# Patient Record
Sex: MALE | Race: BLACK OR AFRICAN AMERICAN | Employment: OTHER | URBAN - METROPOLITAN AREA
[De-identification: names, ages, dates, MRNs, and addresses within clinical notes are randomized per-mention and may not be internally consistent; named-entity substitution may affect disease eponyms.]

---

## 2018-03-15 ENCOUNTER — NURSE ONLY (OUTPATIENT)
Dept: FAMILY MEDICINE CLINIC | Age: 68
End: 2018-03-15
Payer: MEDICARE

## 2018-03-15 ENCOUNTER — HOSPITAL ENCOUNTER (OUTPATIENT)
Age: 68
Discharge: HOME | End: 2018-03-17
Payer: MEDICARE

## 2018-03-15 DIAGNOSIS — R71.8 MICROCYTOSIS: Primary | ICD-10-CM

## 2018-03-15 DIAGNOSIS — R71.8 MICROCYTOSIS: ICD-10-CM

## 2018-03-15 PROCEDURE — 83550 IRON BINDING TEST: CPT

## 2018-03-15 PROCEDURE — 83540 ASSAY OF IRON: CPT

## 2018-03-15 PROCEDURE — 82728 ASSAY OF FERRITIN: CPT

## 2018-03-15 PROCEDURE — 36415 COLL VENOUS BLD VENIPUNCTURE: CPT | Performed by: FAMILY MEDICINE

## 2018-03-16 ENCOUNTER — HOSPITAL ENCOUNTER (OUTPATIENT)
Age: 68
Discharge: HOME | End: 2018-03-18
Payer: MEDICARE

## 2018-03-16 ENCOUNTER — OFFICE VISIT (OUTPATIENT)
Dept: FAMILY MEDICINE CLINIC | Age: 68
End: 2018-03-16
Payer: MEDICARE

## 2018-03-16 VITALS
BODY MASS INDEX: 34.03 KG/M2 | OXYGEN SATURATION: 92 % | DIASTOLIC BLOOD PRESSURE: 78 MMHG | HEART RATE: 60 BPM | SYSTOLIC BLOOD PRESSURE: 132 MMHG | WEIGHT: 244 LBS | TEMPERATURE: 97.6 F

## 2018-03-16 DIAGNOSIS — G89.29 CHRONIC PAIN OF LEFT KNEE: Primary | ICD-10-CM

## 2018-03-16 DIAGNOSIS — R06.02 SOBOE (SHORTNESS OF BREATH ON EXERTION): ICD-10-CM

## 2018-03-16 DIAGNOSIS — G89.29 CHRONIC PAIN OF LEFT KNEE: ICD-10-CM

## 2018-03-16 DIAGNOSIS — F33.42 RECURRENT MAJOR DEPRESSIVE EPISODES, IN FULL REMISSION (HCC): ICD-10-CM

## 2018-03-16 DIAGNOSIS — M25.562 CHRONIC PAIN OF LEFT KNEE: Primary | ICD-10-CM

## 2018-03-16 DIAGNOSIS — Z98.890 S/P LEFT KNEE SURGERY: ICD-10-CM

## 2018-03-16 DIAGNOSIS — M25.562 CHRONIC PAIN OF LEFT KNEE: ICD-10-CM

## 2018-03-16 LAB
BASOPHILS ABSOLUTE: 0.05 E9/L (ref 0–0.2)
BASOPHILS RELATIVE PERCENT: 0.7 % (ref 0–2)
C-REACTIVE PROTEIN: <0.1 MG/DL (ref 0–0.4)
EOSINOPHILS ABSOLUTE: 0.04 E9/L (ref 0.05–0.5)
EOSINOPHILS RELATIVE PERCENT: 0.5 % (ref 0–6)
FERRITIN: 108 NG/ML
HCT VFR BLD CALC: 40.8 % (ref 37–54)
HEMOGLOBIN: 15.2 G/DL (ref 12.5–16.5)
IMMATURE GRANULOCYTES #: 0.01 E9/L
IMMATURE GRANULOCYTES %: 0.1 % (ref 0–5)
IRON SATURATION: 21 % (ref 20–55)
IRON: 82 MCG/DL (ref 59–158)
LYMPHOCYTES ABSOLUTE: 1.98 E9/L (ref 1.5–4)
LYMPHOCYTES RELATIVE PERCENT: 26.8 % (ref 20–42)
MCH RBC QN AUTO: 29 PG (ref 26–35)
MCHC RBC AUTO-ENTMCNC: 37.3 % (ref 32–34.5)
MCV RBC AUTO: 77.9 FL (ref 80–99.9)
MONOCYTES ABSOLUTE: 0.68 E9/L (ref 0.1–0.95)
MONOCYTES RELATIVE PERCENT: 9.2 % (ref 2–12)
NEUTROPHILS ABSOLUTE: 4.63 E9/L (ref 1.8–7.3)
NEUTROPHILS RELATIVE PERCENT: 62.7 % (ref 43–80)
PDW BLD-RTO: 15.7 FL (ref 11.5–15)
PLATELET # BLD: 173 E9/L (ref 130–450)
PMV BLD AUTO: 12.8 FL (ref 7–12)
RBC # BLD: 5.24 E12/L (ref 3.8–5.8)
TOTAL IRON BINDING CAPACITY: 394 MCG/DL (ref 250–450)
WBC # BLD: 7.4 E9/L (ref 4.5–11.5)

## 2018-03-16 PROCEDURE — 85025 COMPLETE CBC W/AUTO DIFF WBC: CPT

## 2018-03-16 PROCEDURE — 85651 RBC SED RATE NONAUTOMATED: CPT

## 2018-03-16 PROCEDURE — 93000 ELECTROCARDIOGRAM COMPLETE: CPT | Performed by: FAMILY MEDICINE

## 2018-03-16 PROCEDURE — 36415 COLL VENOUS BLD VENIPUNCTURE: CPT | Performed by: FAMILY MEDICINE

## 2018-03-16 PROCEDURE — 86140 C-REACTIVE PROTEIN: CPT

## 2018-03-16 PROCEDURE — 99214 OFFICE O/P EST MOD 30 MIN: CPT | Performed by: FAMILY MEDICINE

## 2018-03-16 PROCEDURE — G0444 DEPRESSION SCREEN ANNUAL: HCPCS | Performed by: FAMILY MEDICINE

## 2018-03-16 RX ORDER — HYDROCHLOROTHIAZIDE 50 MG/1
50 TABLET ORAL DAILY
Qty: 30 TABLET | Refills: 5 | Status: SHIPPED | OUTPATIENT
Start: 2018-03-16 | End: 2019-10-17

## 2018-03-16 RX ORDER — TAMSULOSIN HYDROCHLORIDE 0.4 MG/1
0.8 CAPSULE ORAL DAILY
Qty: 60 CAPSULE | Refills: 3 | Status: SHIPPED | OUTPATIENT
Start: 2018-03-16 | End: 2018-03-19 | Stop reason: SDUPTHER

## 2018-03-16 RX ORDER — AMLODIPINE BESYLATE 10 MG/1
TABLET ORAL
Qty: 30 TABLET | Refills: 5 | Status: SHIPPED | OUTPATIENT
Start: 2018-03-16 | End: 2019-10-17 | Stop reason: SDUPTHER

## 2018-03-16 RX ORDER — METOPROLOL SUCCINATE 50 MG/1
50 TABLET, EXTENDED RELEASE ORAL 2 TIMES DAILY
Qty: 60 TABLET | Refills: 5 | Status: SHIPPED | OUTPATIENT
Start: 2018-03-16 | End: 2019-10-17 | Stop reason: SDUPTHER

## 2018-03-16 RX ORDER — OMEPRAZOLE 20 MG/1
20 CAPSULE, DELAYED RELEASE ORAL DAILY
Qty: 30 CAPSULE | Refills: 2 | Status: SHIPPED | OUTPATIENT
Start: 2018-03-16 | End: 2019-10-17 | Stop reason: SDUPTHER

## 2018-03-16 ASSESSMENT — PATIENT HEALTH QUESTIONNAIRE - PHQ9
SUM OF ALL RESPONSES TO PHQ9 QUESTIONS 1 & 2: 3
3. TROUBLE FALLING OR STAYING ASLEEP: 2
7. TROUBLE CONCENTRATING ON THINGS, SUCH AS READING THE NEWSPAPER OR WATCHING TELEVISION: 2
5. POOR APPETITE OR OVEREATING: 0
8. MOVING OR SPEAKING SO SLOWLY THAT OTHER PEOPLE COULD HAVE NOTICED. OR THE OPPOSITE, BEING SO FIGETY OR RESTLESS THAT YOU HAVE BEEN MOVING AROUND A LOT MORE THAN USUAL: 0
4. FEELING TIRED OR HAVING LITTLE ENERGY: 2
2. FEELING DOWN, DEPRESSED OR HOPELESS: 3
10. IF YOU CHECKED OFF ANY PROBLEMS, HOW DIFFICULT HAVE THESE PROBLEMS MADE IT FOR YOU TO DO YOUR WORK, TAKE CARE OF THINGS AT HOME, OR GET ALONG WITH OTHER PEOPLE: 2
SUM OF ALL RESPONSES TO PHQ QUESTIONS 1-9: 9
9. THOUGHTS THAT YOU WOULD BE BETTER OFF DEAD, OR OF HURTING YOURSELF: 0
6. FEELING BAD ABOUT YOURSELF - OR THAT YOU ARE A FAILURE OR HAVE LET YOURSELF OR YOUR FAMILY DOWN: 0

## 2018-03-16 NOTE — PROGRESS NOTES
says that his back pain is basically resolved after the purchase of a new mattress which she has been sleeping on. Family History   Problem Relation Age of Onset    High Blood Pressure Father     Diabetes Sister     Heart Disease Brother     Diabetes Sister     Diabetes Sister        Past Surgical History:   Procedure Laterality Date    CARDIAC PACEMAKER PLACEMENT  2014    CHOLECYSTECTOMY  1999    VENTRICULOPERITONEAL SHUNT  1994       Social History   Substance Use Topics    Smoking status: Former Smoker     Types: Cigarettes    Smokeless tobacco: Never Used      Comment: quit 40 years    Alcohol use Yes      Comment: rare       ROS:  No CP, No palpitations,   +sob, No cough,   No abd pain, No heartburn,   No headaches,   No tingling, No numbness, No weakness,   No bowel changes, No hematochezia, No melena,  No bladder changes, No hematuria  No skin rashes, No skin lesions. No vision changes, No hearing changes,   No polyuria, polydipsia, polyphagia. Stable mood. ROS otherwise negative unless as listed in HPI. Chart reviewed and updated where appropriate for PMH, Fam, and Soc Hx. Physical Exam   /78 (Site: Right Arm, Position: Sitting, Cuff Size: Medium Adult)   Pulse 60   Temp 97.6 °F (36.4 °C) (Oral)   Wt 244 lb (110.7 kg)   SpO2 92%   BMI 34.03 kg/m²   Wt Readings from Last 3 Encounters:   03/16/18 244 lb (110.7 kg)   01/17/18 275 lb (124.7 kg)   01/02/18 245 lb 4.8 oz (111.3 kg)       Constitutional:    He is oriented to person, place, and time. He appears well-developed and well-nourished. HENT:    Nose: Nose normal.    Mouth/Throat: Oropharynx is clear and moist.   Eyes:    Conjunctivae are normal.    Pupils are equal, round, and reactive to light. EOMI. Neck:    Normal range of motion. No thyromegaly or nodules noted. No bruit. Cardiovascular:    Normal rate, regular rhythm and normal heart sounds. No murmur. No gallop and no friction rub.

## 2018-03-17 LAB — SEDIMENTATION RATE, ERYTHROCYTE: 0 MM/HR (ref 0–15)

## 2018-03-19 ENCOUNTER — TELEPHONE (OUTPATIENT)
Dept: FAMILY MEDICINE CLINIC | Age: 68
End: 2018-03-19

## 2018-03-19 NOTE — TELEPHONE ENCOUNTER
Elizabeth from the [address] [place] [address] called [phone]- they are going to schedule with Dr Sandoval as soon as they get in contact with the patient. Just wanted  to be aware they are trying to get him an appt.

## 2018-03-20 RX ORDER — TAMSULOSIN HYDROCHLORIDE 0.4 MG/1
0.8 CAPSULE ORAL DAILY
Qty: 60 CAPSULE | Refills: 3 | Status: SHIPPED | OUTPATIENT
Start: 2018-03-20 | End: 2018-04-18

## 2018-04-06 ENCOUNTER — TELEPHONE (OUTPATIENT)
Dept: FAMILY MEDICINE CLINIC | Age: 68
End: 2018-04-06

## 2018-04-06 DIAGNOSIS — G89.29 CHRONIC PAIN OF LEFT KNEE: Primary | ICD-10-CM

## 2018-04-06 DIAGNOSIS — M25.562 CHRONIC PAIN OF LEFT KNEE: Primary | ICD-10-CM

## 2018-04-06 DIAGNOSIS — Z96.652 HISTORY OF LEFT KNEE REPLACEMENT: ICD-10-CM

## 2018-04-18 ENCOUNTER — HOSPITAL ENCOUNTER (EMERGENCY)
Age: 68
Discharge: HOME | End: 2018-04-18
Attending: EMERGENCY MEDICINE
Payer: MEDICARE

## 2018-04-18 VITALS
RESPIRATION RATE: 18 BRPM | HEART RATE: 61 BPM | OXYGEN SATURATION: 96 % | DIASTOLIC BLOOD PRESSURE: 90 MMHG | TEMPERATURE: 98.2 F | SYSTOLIC BLOOD PRESSURE: 149 MMHG

## 2018-04-18 DIAGNOSIS — N30.01 ACUTE CYSTITIS WITH HEMATURIA: Primary | ICD-10-CM

## 2018-04-18 LAB
ANION GAP SERPL CALCULATED.3IONS-SCNC: 11 MMOL/L (ref 7–16)
BACTERIA: ABNORMAL /HPF
BASOPHILS ABSOLUTE: 0.06 E9/L (ref 0–0.2)
BASOPHILS RELATIVE PERCENT: 0.5 % (ref 0–2)
BILIRUBIN URINE: NEGATIVE
BLOOD, URINE: ABNORMAL
BUN BLDV-MCNC: 15 MG/DL (ref 8–23)
CALCIUM SERPL-MCNC: 9 MG/DL (ref 8.6–10.2)
CHLORIDE BLD-SCNC: 103 MMOL/L (ref 98–107)
CLARITY: ABNORMAL
CO2: 23 MMOL/L (ref 22–29)
COLOR: YELLOW
CREAT SERPL-MCNC: 1 MG/DL (ref 0.7–1.2)
EOSINOPHILS ABSOLUTE: 0.07 E9/L (ref 0.05–0.5)
EOSINOPHILS RELATIVE PERCENT: 0.6 % (ref 0–6)
GFR AFRICAN AMERICAN: >60
GFR NON-AFRICAN AMERICAN: >60 ML/MIN/1.73
GLUCOSE BLD-MCNC: 111 MG/DL (ref 74–109)
GLUCOSE URINE: NEGATIVE MG/DL
HCT VFR BLD CALC: 37.2 % (ref 37–54)
HEMOGLOBIN: 13.6 G/DL (ref 12.5–16.5)
IMMATURE GRANULOCYTES #: 0.05 E9/L
IMMATURE GRANULOCYTES %: 0.4 % (ref 0–5)
KETONES, URINE: NEGATIVE MG/DL
LEUKOCYTE ESTERASE, URINE: ABNORMAL
LYMPHOCYTES ABSOLUTE: 2.04 E9/L (ref 1.5–4)
LYMPHOCYTES RELATIVE PERCENT: 18.1 % (ref 20–42)
MCH RBC QN AUTO: 29.3 PG (ref 26–35)
MCHC RBC AUTO-ENTMCNC: 36.6 % (ref 32–34.5)
MCV RBC AUTO: 80.2 FL (ref 80–99.9)
MONOCYTES ABSOLUTE: 0.95 E9/L (ref 0.1–0.95)
MONOCYTES RELATIVE PERCENT: 8.5 % (ref 2–12)
NEUTROPHILS ABSOLUTE: 8.07 E9/L (ref 1.8–7.3)
NEUTROPHILS RELATIVE PERCENT: 71.9 % (ref 43–80)
NITRITE, URINE: POSITIVE
PDW BLD-RTO: 14.2 FL (ref 11.5–15)
PH UA: 6 (ref 5–9)
PLATELET # BLD: 177 E9/L (ref 130–450)
PMV BLD AUTO: 11.3 FL (ref 7–12)
POTASSIUM SERPL-SCNC: 4.1 MMOL/L (ref 3.5–5)
PROTEIN UA: 30 MG/DL
RBC # BLD: 4.64 E12/L (ref 3.8–5.8)
RBC UA: ABNORMAL /HPF (ref 0–2)
SODIUM BLD-SCNC: 137 MMOL/L (ref 132–146)
SPECIFIC GRAVITY UA: 1.01 (ref 1–1.03)
UROBILINOGEN, URINE: 0.2 E.U./DL
WBC # BLD: 11.2 E9/L (ref 4.5–11.5)
WBC UA: >20 /HPF (ref 0–5)

## 2018-04-18 PROCEDURE — 2580000003 HC RX 258: Performed by: PHYSICIAN ASSISTANT

## 2018-04-18 PROCEDURE — 99284 EMERGENCY DEPT VISIT MOD MDM: CPT

## 2018-04-18 PROCEDURE — 85025 COMPLETE CBC W/AUTO DIFF WBC: CPT

## 2018-04-18 PROCEDURE — 80048 BASIC METABOLIC PNL TOTAL CA: CPT

## 2018-04-18 PROCEDURE — 96365 THER/PROPH/DIAG IV INF INIT: CPT

## 2018-04-18 PROCEDURE — 81001 URINALYSIS AUTO W/SCOPE: CPT

## 2018-04-18 PROCEDURE — 96375 TX/PRO/DX INJ NEW DRUG ADDON: CPT

## 2018-04-18 PROCEDURE — 87088 URINE BACTERIA CULTURE: CPT

## 2018-04-18 PROCEDURE — 87186 SC STD MICRODIL/AGAR DIL: CPT

## 2018-04-18 PROCEDURE — 6360000002 HC RX W HCPCS: Performed by: PHYSICIAN ASSISTANT

## 2018-04-18 RX ORDER — CEFDINIR 300 MG/1
300 CAPSULE ORAL 2 TIMES DAILY
Qty: 20 CAPSULE | Refills: 0 | Status: SHIPPED | OUTPATIENT
Start: 2018-04-18 | End: 2018-04-28

## 2018-04-18 RX ORDER — PHENAZOPYRIDINE HYDROCHLORIDE 100 MG/1
100 TABLET, FILM COATED ORAL 3 TIMES DAILY PRN
Qty: 9 TABLET | Refills: 0 | Status: SHIPPED | OUTPATIENT
Start: 2018-04-18 | End: 2018-04-21

## 2018-04-18 RX ORDER — OXYCODONE HYDROCHLORIDE AND ACETAMINOPHEN 5; 325 MG/1; MG/1
1 TABLET ORAL EVERY 6 HOURS PRN
Qty: 10 TABLET | Refills: 0 | Status: SHIPPED | OUTPATIENT
Start: 2018-04-18 | End: 2018-04-21

## 2018-04-18 RX ORDER — TAMSULOSIN HYDROCHLORIDE 0.4 MG/1
CAPSULE ORAL
Qty: 60 CAPSULE | Refills: 1 | Status: SHIPPED | OUTPATIENT
Start: 2018-04-18 | End: 2018-04-22 | Stop reason: ALTCHOICE

## 2018-04-18 RX ORDER — ONDANSETRON 4 MG/1
4 TABLET, ORALLY DISINTEGRATING ORAL EVERY 8 HOURS PRN
Qty: 10 TABLET | Refills: 0 | Status: SHIPPED | OUTPATIENT
Start: 2018-04-18 | End: 2018-04-22 | Stop reason: ALTCHOICE

## 2018-04-18 RX ORDER — KETOROLAC TROMETHAMINE 30 MG/ML
15 INJECTION, SOLUTION INTRAMUSCULAR; INTRAVENOUS ONCE
Status: COMPLETED | OUTPATIENT
Start: 2018-04-18 | End: 2018-04-18

## 2018-04-18 RX ORDER — ONDANSETRON 2 MG/ML
4 INJECTION INTRAMUSCULAR; INTRAVENOUS ONCE
Status: COMPLETED | OUTPATIENT
Start: 2018-04-18 | End: 2018-04-18

## 2018-04-18 RX ADMIN — KETOROLAC TROMETHAMINE 15 MG: 30 INJECTION, SOLUTION INTRAMUSCULAR at 10:18

## 2018-04-18 RX ADMIN — ONDANSETRON 4 MG: 2 INJECTION INTRAMUSCULAR; INTRAVENOUS at 10:18

## 2018-04-18 RX ADMIN — CEFTRIAXONE 1 G: 1 INJECTION, POWDER, FOR SOLUTION INTRAMUSCULAR; INTRAVENOUS at 11:24

## 2018-04-18 ASSESSMENT — PAIN DESCRIPTION - FREQUENCY: FREQUENCY: CONTINUOUS

## 2018-04-18 ASSESSMENT — PAIN DESCRIPTION - LOCATION: LOCATION: FLANK

## 2018-04-18 ASSESSMENT — PAIN DESCRIPTION - ORIENTATION: ORIENTATION: LEFT;RIGHT

## 2018-04-18 ASSESSMENT — PAIN DESCRIPTION - DESCRIPTORS: DESCRIPTORS: STABBING

## 2018-04-18 ASSESSMENT — PAIN SCALES - GENERAL
PAINLEVEL_OUTOF10: 6
PAINLEVEL_OUTOF10: 6

## 2018-04-18 ASSESSMENT — PAIN DESCRIPTION - PAIN TYPE: TYPE: ACUTE PAIN

## 2018-04-20 LAB
ORGANISM: ABNORMAL
URINE CULTURE, ROUTINE: ABNORMAL
URINE CULTURE, ROUTINE: ABNORMAL

## 2018-04-22 ENCOUNTER — HOSPITAL ENCOUNTER (EMERGENCY)
Age: 68
Discharge: HOME | End: 2018-04-22
Payer: MEDICARE

## 2018-04-22 ENCOUNTER — APPOINTMENT (OUTPATIENT)
Dept: GENERAL RADIOLOGY | Age: 68
End: 2018-04-22
Payer: MEDICARE

## 2018-04-22 VITALS
BODY MASS INDEX: 34.16 KG/M2 | DIASTOLIC BLOOD PRESSURE: 98 MMHG | RESPIRATION RATE: 14 BRPM | OXYGEN SATURATION: 94 % | WEIGHT: 244 LBS | TEMPERATURE: 98.1 F | SYSTOLIC BLOOD PRESSURE: 163 MMHG | HEIGHT: 71 IN | HEART RATE: 65 BPM

## 2018-04-22 DIAGNOSIS — M25.562 CHRONIC PAIN OF LEFT KNEE: ICD-10-CM

## 2018-04-22 DIAGNOSIS — G89.29 CHRONIC PAIN OF LEFT KNEE: ICD-10-CM

## 2018-04-22 DIAGNOSIS — M17.11 ARTHRITIS OF RIGHT KNEE: Primary | ICD-10-CM

## 2018-04-22 DIAGNOSIS — M54.50 ACUTE EXACERBATION OF CHRONIC LOW BACK PAIN: ICD-10-CM

## 2018-04-22 DIAGNOSIS — G89.29 ACUTE EXACERBATION OF CHRONIC LOW BACK PAIN: ICD-10-CM

## 2018-04-22 PROCEDURE — 6370000000 HC RX 637 (ALT 250 FOR IP): Performed by: PHYSICIAN ASSISTANT

## 2018-04-22 PROCEDURE — 73564 X-RAY EXAM KNEE 4 OR MORE: CPT

## 2018-04-22 PROCEDURE — 72110 X-RAY EXAM L-2 SPINE 4/>VWS: CPT

## 2018-04-22 PROCEDURE — 99283 EMERGENCY DEPT VISIT LOW MDM: CPT

## 2018-04-22 RX ORDER — NAPROXEN 500 MG/1
500 TABLET ORAL 2 TIMES DAILY
Qty: 14 TABLET | Refills: 0 | Status: SHIPPED | OUTPATIENT
Start: 2018-04-22 | End: 2018-04-22 | Stop reason: ALTCHOICE

## 2018-04-22 RX ORDER — HYDROCODONE BITARTRATE AND ACETAMINOPHEN 5; 325 MG/1; MG/1
1 TABLET ORAL ONCE
Status: COMPLETED | OUTPATIENT
Start: 2018-04-22 | End: 2018-04-22

## 2018-04-22 RX ORDER — ACETAMINOPHEN 500 MG
1000 TABLET ORAL EVERY 8 HOURS PRN
Qty: 20 TABLET | Refills: 0 | Status: SHIPPED | OUTPATIENT
Start: 2018-04-22 | End: 2019-10-17

## 2018-04-22 RX ADMIN — HYDROCODONE BITARTRATE AND ACETAMINOPHEN 1 TABLET: 5; 325 TABLET ORAL at 08:41

## 2018-04-22 ASSESSMENT — PAIN DESCRIPTION - FREQUENCY: FREQUENCY: CONTINUOUS

## 2018-04-22 ASSESSMENT — PAIN DESCRIPTION - ORIENTATION: ORIENTATION: LEFT;RIGHT;LOWER

## 2018-04-22 ASSESSMENT — PAIN SCALES - GENERAL
PAINLEVEL_OUTOF10: 6
PAINLEVEL_OUTOF10: 6

## 2018-04-22 ASSESSMENT — PAIN DESCRIPTION - DESCRIPTORS: DESCRIPTORS: ACHING;STABBING

## 2018-04-22 ASSESSMENT — PAIN DESCRIPTION - LOCATION: LOCATION: BACK;KNEE

## 2018-04-22 ASSESSMENT — PAIN DESCRIPTION - PAIN TYPE: TYPE: ACUTE PAIN

## 2018-04-27 ENCOUNTER — TELEPHONE (OUTPATIENT)
Dept: FAMILY MEDICINE CLINIC | Age: 68
End: 2018-04-27

## 2018-08-13 ENCOUNTER — APPOINTMENT (OUTPATIENT)
Dept: GENERAL RADIOLOGY | Age: 68
End: 2018-08-13
Payer: MEDICARE

## 2018-08-13 ENCOUNTER — APPOINTMENT (OUTPATIENT)
Dept: CT IMAGING | Age: 68
End: 2018-08-13
Payer: MEDICARE

## 2018-08-13 ENCOUNTER — HOSPITAL ENCOUNTER (EMERGENCY)
Age: 68
Discharge: HOME | End: 2018-08-13
Attending: EMERGENCY MEDICINE
Payer: MEDICARE

## 2018-08-13 VITALS
RESPIRATION RATE: 14 BRPM | HEIGHT: 71 IN | HEART RATE: 60 BPM | TEMPERATURE: 98.4 F | WEIGHT: 248 LBS | OXYGEN SATURATION: 94 % | SYSTOLIC BLOOD PRESSURE: 140 MMHG | DIASTOLIC BLOOD PRESSURE: 82 MMHG | BODY MASS INDEX: 34.72 KG/M2

## 2018-08-13 DIAGNOSIS — R06.00 DYSPNEA, UNSPECIFIED TYPE: Primary | ICD-10-CM

## 2018-08-13 LAB
ANION GAP SERPL CALCULATED.3IONS-SCNC: 13 MMOL/L (ref 7–16)
BASOPHILS ABSOLUTE: 0.06 E9/L (ref 0–0.2)
BASOPHILS RELATIVE PERCENT: 0.8 % (ref 0–2)
BUN BLDV-MCNC: 23 MG/DL (ref 8–23)
CALCIUM SERPL-MCNC: 9.6 MG/DL (ref 8.6–10.2)
CHLORIDE BLD-SCNC: 101 MMOL/L (ref 98–107)
CO2: 24 MMOL/L (ref 22–29)
CREAT SERPL-MCNC: 0.9 MG/DL (ref 0.7–1.2)
EKG ATRIAL RATE: 60 BPM
EKG P AXIS: -21 DEGREES
EKG P-R INTERVAL: 242 MS
EKG Q-T INTERVAL: 440 MS
EKG QRS DURATION: 174 MS
EKG QTC CALCULATION (BAZETT): 440 MS
EKG R AXIS: -41 DEGREES
EKG T AXIS: -32 DEGREES
EKG VENTRICULAR RATE: 60 BPM
EOSINOPHILS ABSOLUTE: 0.08 E9/L (ref 0.05–0.5)
EOSINOPHILS RELATIVE PERCENT: 1.1 % (ref 0–6)
GFR AFRICAN AMERICAN: >60
GFR NON-AFRICAN AMERICAN: >60 ML/MIN/1.73
GLUCOSE BLD-MCNC: 96 MG/DL (ref 74–109)
HCT VFR BLD CALC: 38.7 % (ref 37–54)
HEMOGLOBIN: 14.3 G/DL (ref 12.5–16.5)
IMMATURE GRANULOCYTES #: 0.03 E9/L
IMMATURE GRANULOCYTES %: 0.4 % (ref 0–5)
LYMPHOCYTES ABSOLUTE: 2.38 E9/L (ref 1.5–4)
LYMPHOCYTES RELATIVE PERCENT: 32.1 % (ref 20–42)
MCH RBC QN AUTO: 29.6 PG (ref 26–35)
MCHC RBC AUTO-ENTMCNC: 37 % (ref 32–34.5)
MCV RBC AUTO: 80.1 FL (ref 80–99.9)
MONOCYTES ABSOLUTE: 0.6 E9/L (ref 0.1–0.95)
MONOCYTES RELATIVE PERCENT: 8.1 % (ref 2–12)
NEUTROPHILS ABSOLUTE: 4.27 E9/L (ref 1.8–7.3)
NEUTROPHILS RELATIVE PERCENT: 57.5 % (ref 43–80)
PDW BLD-RTO: 14.3 FL (ref 11.5–15)
PLATELET # BLD: 190 E9/L (ref 130–450)
PMV BLD AUTO: 11.3 FL (ref 7–12)
POTASSIUM REFLEX MAGNESIUM: 4.3 MMOL/L (ref 3.5–5)
PRO-BNP: 40 PG/ML (ref 0–125)
RBC # BLD: 4.83 E12/L (ref 3.8–5.8)
SODIUM BLD-SCNC: 138 MMOL/L (ref 132–146)
WBC # BLD: 7.4 E9/L (ref 4.5–11.5)

## 2018-08-13 PROCEDURE — 71045 X-RAY EXAM CHEST 1 VIEW: CPT

## 2018-08-13 PROCEDURE — 80048 BASIC METABOLIC PNL TOTAL CA: CPT

## 2018-08-13 PROCEDURE — 85025 COMPLETE CBC W/AUTO DIFF WBC: CPT

## 2018-08-13 PROCEDURE — 99285 EMERGENCY DEPT VISIT HI MDM: CPT

## 2018-08-13 PROCEDURE — 83880 ASSAY OF NATRIURETIC PEPTIDE: CPT

## 2018-08-13 RX ORDER — KETOROLAC TROMETHAMINE 30 MG/ML
15 INJECTION, SOLUTION INTRAMUSCULAR; INTRAVENOUS ONCE
Status: DISCONTINUED | OUTPATIENT
Start: 2018-08-13 | End: 2018-08-13

## 2018-08-13 ASSESSMENT — ENCOUNTER SYMPTOMS
BACK PAIN: 0
SORE THROAT: 0
COUGH: 0
SHORTNESS OF BREATH: 1

## 2018-08-13 NOTE — ED NOTES
Assumed care of patient. Introduced self to patient and family as RN. Questions from patient and family answered satisfactory to them. Call light within reach. Awaiting disposition.      Marquis Teresa RN  08/13/18 [id number]

## 2018-08-20 ENCOUNTER — OFFICE VISIT (OUTPATIENT)
Dept: FAMILY MEDICINE CLINIC | Age: 68
End: 2018-08-20
Payer: MEDICARE

## 2018-08-20 VITALS
BODY MASS INDEX: 33.75 KG/M2 | HEART RATE: 66 BPM | TEMPERATURE: 97.8 F | DIASTOLIC BLOOD PRESSURE: 80 MMHG | SYSTOLIC BLOOD PRESSURE: 124 MMHG | WEIGHT: 242 LBS | OXYGEN SATURATION: 94 %

## 2018-08-20 DIAGNOSIS — R55 SYNCOPE, UNSPECIFIED SYNCOPE TYPE: Primary | ICD-10-CM

## 2018-08-20 DIAGNOSIS — G47.33 OSA (OBSTRUCTIVE SLEEP APNEA): ICD-10-CM

## 2018-08-20 DIAGNOSIS — R06.02 SOBOE (SHORTNESS OF BREATH ON EXERTION): ICD-10-CM

## 2018-08-20 PROCEDURE — 99214 OFFICE O/P EST MOD 30 MIN: CPT | Performed by: FAMILY MEDICINE

## 2018-08-20 NOTE — PROGRESS NOTES
[facility]  Office Progress Note - Dr. Liu Malloy  8/20/18    Chief Complaint   Patient presents with    Shortness of Breath     f/u from the ER        S: SOB has been ongoing for a year and slowly worsening  Past month has been worse. SOB is worse with walking, going up the stairs, walking to kennel to take care of dogs. Cant catch his breath. Takes a few minutes to recover and then it recurs. Bending forward also affects. No coughing. Even notes with shorter distances recently. Bathroom to livingroom. Patient was in the emergency room this past weekend with significantly worsened symptoms. He had an evaluation which ultimately was not revealing for a cause. This included an EKG, chest x-ray, lab work including CBC, CMP, BNP. I reviewed all his results today. He declined a CT of the chest and left against medical advice. He reports he is feeling somewhat improved from this past weekend's status. Stopped smoking regularly in 1969, probably only smoked for   He was previously tested for agent orange exposure and concluded that he didn't have it through the [facility]. He has an upcoming stress test scheduled for 2 days from now with his cardio. He had a syncopal event this week while in the bathroom and was probably unconscious for maybe 5 minutes his wife thinks - who used to be a nurse. She did not call EMS because he has become very angry and irritable in the past when she has done such things and ultimately refused to be checked out. Had a presyncopal event yesterday. Walked to the kitchen and felt very dizzy. Had to sit down in a chair for table, though he did not pass out. He does have a  shunt in his head - which has been clogged in the past. At that time, he had increased headaches, which she has not been experiencing recently.     He also has Diagnosed and untreated sleep apnea, he reports, from a previous sleep study which showed he \"stopped breathing Mouth/Throat: Oropharynx is clear and moist.   Eyes:    Conjunctivae are normal.    Pupils are equal, round, and reactive to light. EOMI. Neck:    Normal range of motion. No thyromegaly or nodules noted. Possible left carotid bruit  Cardiovascular:    Normal rate, regular rhythm and normal heart sounds. No murmur. No gallop and no friction rub. Pulmonary/Chest:    Effort normal and breath sounds normal.    No wheezes. No rales or rhonchi. Abdominal:    Soft. Bowel sounds are normal.    No distension. No tenderness. Musculoskeletal:    Normal range of motion. No joint swelling noted. No peripheral edema. Skin:    Skin is warm and dry. No rashes, lesions. Psychiatric:    He has a normal mood and affect. Normal groom and dress. Current Outpatient Prescriptions on File Prior to Visit   Medication Sig Dispense Refill    acetaminophen (APAP EXTRA STRENGTH) 500 MG tablet Take 2 tablets by mouth every 8 hours as needed for Pain 20 tablet 0    omeprazole (PRILOSEC) 20 MG delayed release capsule Take 1 capsule by mouth daily 30 capsule 2    metoprolol succinate (TOPROL XL) 50 MG extended release tablet Take 1 tablet by mouth 2 times daily TAKE 1 TABLET BY MOUTH DAILY 60 tablet 5    amLODIPine (NORVASC) 10 MG tablet TAKE 1 TABLET BY MOUTH EVERY DAY for blood pressure 30 tablet 5    hydrochlorothiazide (HYDRODIURIL) 50 MG tablet Take 1 tablet by mouth daily 30 tablet 5    ARIPiprazole (ABILIFY) 5 MG tablet Take 1 tablet by mouth daily  2    LORazepam (ATIVAN) 0.5 MG tablet 0.5 mg 2 times daily as needed. No current facility-administered medications on file prior to visit. Patient Active Problem List   Diagnosis Code    PTSD (post-traumatic stress disorder) F43.10    Depression F32.9    Pacemaker Z95.0    HTN (hypertension) I10    Spinal stenosis at L4-L5 level M48.061       Assessment / Plan  Brte was seen today for shortness of breath.     Diagnoses and all were made to reduce grammatical or syntax errors, but some may persist.

## 2018-10-02 DIAGNOSIS — R55 SYNCOPE, UNSPECIFIED SYNCOPE TYPE: ICD-10-CM

## 2018-10-02 DIAGNOSIS — R06.02 SOBOE (SHORTNESS OF BREATH ON EXERTION): ICD-10-CM

## 2018-11-01 ENCOUNTER — TELEPHONE (OUTPATIENT)
Dept: FAMILY MEDICINE CLINIC | Age: 68
End: 2018-11-01

## 2019-01-03 ENCOUNTER — TELEPHONE (OUTPATIENT)
Dept: FAMILY MEDICINE CLINIC | Age: 69
End: 2019-01-03

## 2019-01-07 ENCOUNTER — TELEPHONE (OUTPATIENT)
Dept: FAMILY MEDICINE CLINIC | Age: 69
End: 2019-01-07

## 2019-02-08 ENCOUNTER — TELEPHONE (OUTPATIENT)
Dept: FAMILY MEDICINE CLINIC | Age: 69
End: 2019-02-08

## 2019-03-06 ENCOUNTER — OFFICE VISIT (OUTPATIENT)
Dept: FAMILY MEDICINE CLINIC | Age: 69
End: 2019-03-06
Payer: MEDICARE

## 2019-03-06 VITALS
HEIGHT: 71 IN | WEIGHT: 242.2 LBS | OXYGEN SATURATION: 96 % | HEART RATE: 51 BPM | TEMPERATURE: 98.1 F | BODY MASS INDEX: 33.91 KG/M2 | DIASTOLIC BLOOD PRESSURE: 82 MMHG | SYSTOLIC BLOOD PRESSURE: 130 MMHG

## 2019-03-06 DIAGNOSIS — M25.561 CHRONIC PAIN OF BOTH KNEES: ICD-10-CM

## 2019-03-06 DIAGNOSIS — R06.02 SOBOE (SHORTNESS OF BREATH ON EXERTION): ICD-10-CM

## 2019-03-06 DIAGNOSIS — M25.562 CHRONIC PAIN OF BOTH KNEES: ICD-10-CM

## 2019-03-06 DIAGNOSIS — M54.50 CHRONIC BILATERAL LOW BACK PAIN WITHOUT SCIATICA: ICD-10-CM

## 2019-03-06 DIAGNOSIS — G89.29 CHRONIC PAIN OF BOTH KNEES: ICD-10-CM

## 2019-03-06 DIAGNOSIS — M48.061 SPINAL STENOSIS OF LUMBAR REGION, UNSPECIFIED WHETHER NEUROGENIC CLAUDICATION PRESENT: ICD-10-CM

## 2019-03-06 DIAGNOSIS — G89.29 CHRONIC BILATERAL LOW BACK PAIN WITHOUT SCIATICA: ICD-10-CM

## 2019-03-06 DIAGNOSIS — Z91.81 AT HIGH RISK FOR FALLS: Primary | ICD-10-CM

## 2019-03-06 PROCEDURE — 99214 OFFICE O/P EST MOD 30 MIN: CPT | Performed by: FAMILY MEDICINE

## 2019-03-06 RX ORDER — TRAMADOL HYDROCHLORIDE 50 MG/1
TABLET ORAL
COMMUNITY
Start: 2012-12-03 | End: 2019-10-17

## 2019-03-06 RX ORDER — MELOXICAM 15 MG/1
15 TABLET ORAL DAILY PRN
Qty: 30 TABLET | Refills: 3 | Status: SHIPPED
Start: 2019-03-06 | End: 2020-03-04

## 2019-03-06 RX ORDER — ESCITALOPRAM OXALATE 10 MG/1
10 TABLET ORAL
COMMUNITY
End: 2019-12-30

## 2019-03-06 RX ORDER — LISINOPRIL 40 MG/1
TABLET ORAL
COMMUNITY
Start: 2019-03-04 | End: 2019-10-17 | Stop reason: SDUPTHER

## 2019-03-06 RX ORDER — TAMSULOSIN HYDROCHLORIDE 0.4 MG/1
0.4 CAPSULE ORAL NIGHTLY
Qty: 30 CAPSULE | Refills: 5
Start: 2019-03-06 | End: 2019-10-17 | Stop reason: SDUPTHER

## 2019-03-06 RX ORDER — ASPIRIN 81 MG/1
TABLET, CHEWABLE ORAL DAILY
COMMUNITY
Start: 2013-11-05

## 2019-03-06 RX ORDER — GABAPENTIN 300 MG/1
CAPSULE ORAL
COMMUNITY
Start: 2012-12-03 | End: 2019-10-17 | Stop reason: SDUPTHER

## 2019-03-06 ASSESSMENT — PATIENT HEALTH QUESTIONNAIRE - PHQ9
1. LITTLE INTEREST OR PLEASURE IN DOING THINGS: 1
SUM OF ALL RESPONSES TO PHQ9 QUESTIONS 1 & 2: 2
SUM OF ALL RESPONSES TO PHQ QUESTIONS 1-9: 2
2. FEELING DOWN, DEPRESSED OR HOPELESS: 1
SUM OF ALL RESPONSES TO PHQ QUESTIONS 1-9: 2

## 2019-03-21 ENCOUNTER — TELEPHONE (OUTPATIENT)
Dept: FAMILY MEDICINE CLINIC | Age: 69
End: 2019-03-21

## 2019-03-21 RX ORDER — ALBUTEROL SULFATE 90 UG/1
2 AEROSOL, METERED RESPIRATORY (INHALATION) 4 TIMES DAILY PRN
Qty: 1 INHALER | Refills: 5 | Status: SHIPPED | OUTPATIENT
Start: 2019-03-21 | End: 2019-10-17 | Stop reason: SDUPTHER

## 2019-03-26 ENCOUNTER — TELEPHONE (OUTPATIENT)
Dept: FAMILY MEDICINE CLINIC | Age: 69
End: 2019-03-26

## 2019-03-26 DIAGNOSIS — G89.29 CHRONIC BILATERAL LOW BACK PAIN WITHOUT SCIATICA: Primary | ICD-10-CM

## 2019-03-26 DIAGNOSIS — M25.561 CHRONIC PAIN OF BOTH KNEES: ICD-10-CM

## 2019-03-26 DIAGNOSIS — M25.562 CHRONIC PAIN OF BOTH KNEES: ICD-10-CM

## 2019-03-26 DIAGNOSIS — M54.50 CHRONIC BILATERAL LOW BACK PAIN WITHOUT SCIATICA: Primary | ICD-10-CM

## 2019-03-26 DIAGNOSIS — G89.29 CHRONIC PAIN OF BOTH KNEES: ICD-10-CM

## 2019-04-12 ENCOUNTER — TELEPHONE (OUTPATIENT)
Dept: FAMILY MEDICINE CLINIC | Age: 69
End: 2019-04-12

## 2019-05-06 ENCOUNTER — TELEPHONE (OUTPATIENT)
Dept: ADMINISTRATIVE | Age: 69
End: 2019-05-06

## 2019-05-08 NOTE — TELEPHONE ENCOUNTER
Spoke with pt on 5/7/19 and advised him that [facility] will need him to sign a records release and fax it to our office. I asked him to call back if there is any issues.

## 2019-05-13 ENCOUNTER — TELEPHONE (OUTPATIENT)
Dept: FAMILY MEDICINE CLINIC | Age: 69
End: 2019-05-13

## 2019-05-13 DIAGNOSIS — G89.29 CHRONIC PAIN OF BOTH KNEES: ICD-10-CM

## 2019-05-13 DIAGNOSIS — M54.50 CHRONIC BILATERAL LOW BACK PAIN WITHOUT SCIATICA: Primary | ICD-10-CM

## 2019-05-13 DIAGNOSIS — M25.562 CHRONIC PAIN OF BOTH KNEES: ICD-10-CM

## 2019-05-13 DIAGNOSIS — G89.29 CHRONIC BILATERAL LOW BACK PAIN WITHOUT SCIATICA: Primary | ICD-10-CM

## 2019-05-13 DIAGNOSIS — M25.561 CHRONIC PAIN OF BOTH KNEES: ICD-10-CM

## 2019-05-13 NOTE — TELEPHONE ENCOUNTER
Patient phoned and states he is not happy with [facility] and they are giving him a run around. He is in a lot of pain and now would like a referral to [facility] in [address] [place], [place] [address]sailajaAbhijeet, he did not have any other info/phone #.

## 2019-10-17 ENCOUNTER — OFFICE VISIT (OUTPATIENT)
Dept: FAMILY MEDICINE CLINIC | Age: 69
End: 2019-10-17
Payer: MEDICARE

## 2019-10-17 VITALS
BODY MASS INDEX: 34 KG/M2 | WEIGHT: 251.02 LBS | HEART RATE: 65 BPM | OXYGEN SATURATION: 98 % | TEMPERATURE: 96.6 F | DIASTOLIC BLOOD PRESSURE: 80 MMHG | SYSTOLIC BLOOD PRESSURE: 140 MMHG | HEIGHT: 72 IN

## 2019-10-17 DIAGNOSIS — F32.A DEPRESSION, UNSPECIFIED DEPRESSION TYPE: ICD-10-CM

## 2019-10-17 DIAGNOSIS — Z12.11 COLON CANCER SCREENING: Primary | ICD-10-CM

## 2019-10-17 DIAGNOSIS — M48.061 SPINAL STENOSIS AT L4-L5 LEVEL: ICD-10-CM

## 2019-10-17 DIAGNOSIS — N52.9 ERECTILE DYSFUNCTION, UNSPECIFIED ERECTILE DYSFUNCTION TYPE: ICD-10-CM

## 2019-10-17 DIAGNOSIS — I10 ESSENTIAL HYPERTENSION: ICD-10-CM

## 2019-10-17 DIAGNOSIS — R73.09 ELEVATED GLUCOSE: ICD-10-CM

## 2019-10-17 DIAGNOSIS — Z12.5 SCREENING FOR MALIGNANT NEOPLASM OF PROSTATE: ICD-10-CM

## 2019-10-17 DIAGNOSIS — R63.1 POLYDIPSIA: ICD-10-CM

## 2019-10-17 DIAGNOSIS — N40.1 BENIGN PROSTATIC HYPERPLASIA WITH LOWER URINARY TRACT SYMPTOMS, SYMPTOM DETAILS UNSPECIFIED: ICD-10-CM

## 2019-10-17 PROCEDURE — 99214 OFFICE O/P EST MOD 30 MIN: CPT | Performed by: FAMILY MEDICINE

## 2019-10-17 RX ORDER — HYDROCODONE BITARTRATE AND ACETAMINOPHEN 5; 325 MG/1; MG/1
TABLET ORAL
Refills: 0 | COMMUNITY
Start: 2019-09-19 | End: 2020-01-06

## 2019-10-17 RX ORDER — METOPROLOL SUCCINATE 50 MG/1
TABLET, EXTENDED RELEASE ORAL
Qty: 90 TABLET | Refills: 1 | Status: SHIPPED | OUTPATIENT
Start: 2019-10-17 | End: 2020-02-03

## 2019-10-17 RX ORDER — AMLODIPINE BESYLATE 10 MG/1
TABLET ORAL
Qty: 90 TABLET | Refills: 1 | Status: SHIPPED | OUTPATIENT
Start: 2019-10-17 | End: 2020-02-03

## 2019-10-17 RX ORDER — GABAPENTIN 300 MG/1
300 CAPSULE ORAL 3 TIMES DAILY
Qty: 270 CAPSULE | Refills: 0 | Status: SHIPPED | OUTPATIENT
Start: 2019-10-17 | End: 2019-12-06 | Stop reason: SDUPTHER

## 2019-10-17 RX ORDER — LISINOPRIL 40 MG/1
40 TABLET ORAL DAILY
Qty: 90 TABLET | Refills: 1 | Status: SHIPPED | OUTPATIENT
Start: 2019-10-17 | End: 2020-01-06 | Stop reason: SDUPTHER

## 2019-10-17 RX ORDER — TAMSULOSIN HYDROCHLORIDE 0.4 MG/1
0.4 CAPSULE ORAL NIGHTLY
Qty: 90 CAPSULE | Refills: 1 | Status: SHIPPED | OUTPATIENT
Start: 2019-10-17 | End: 2019-12-30

## 2019-10-17 RX ORDER — ALBUTEROL SULFATE 90 UG/1
2 AEROSOL, METERED RESPIRATORY (INHALATION) 4 TIMES DAILY PRN
Qty: 1 INHALER | Refills: 5 | Status: SHIPPED | OUTPATIENT
Start: 2019-10-17 | End: 2020-02-03

## 2019-10-17 RX ORDER — OMEPRAZOLE 20 MG/1
20 CAPSULE, DELAYED RELEASE ORAL DAILY
Qty: 90 CAPSULE | Refills: 1 | Status: SHIPPED | OUTPATIENT
Start: 2019-10-17 | End: 2019-12-30

## 2019-10-17 RX ORDER — SILDENAFIL 100 MG/1
100 TABLET, FILM COATED ORAL DAILY PRN
Qty: 10 TABLET | Refills: 0 | Status: SHIPPED | OUTPATIENT
Start: 2019-10-17 | End: 2020-01-06 | Stop reason: SDUPTHER

## 2019-10-30 ENCOUNTER — HOSPITAL ENCOUNTER (OUTPATIENT)
Age: 69
Discharge: HOME | End: 2019-11-01
Payer: MEDICARE

## 2019-10-30 ENCOUNTER — OFFICE VISIT (OUTPATIENT)
Dept: FAMILY MEDICINE CLINIC | Age: 69
End: 2019-10-30
Payer: MEDICARE

## 2019-10-30 VITALS
HEIGHT: 72 IN | SYSTOLIC BLOOD PRESSURE: 104 MMHG | HEART RATE: 68 BPM | DIASTOLIC BLOOD PRESSURE: 60 MMHG | TEMPERATURE: 97.3 F | OXYGEN SATURATION: 97 % | WEIGHT: 243 LBS | BODY MASS INDEX: 32.91 KG/M2

## 2019-10-30 DIAGNOSIS — I10 ESSENTIAL HYPERTENSION: ICD-10-CM

## 2019-10-30 DIAGNOSIS — K57.92 DIVERTICULITIS: Primary | ICD-10-CM

## 2019-10-30 DIAGNOSIS — R73.09 ELEVATED GLUCOSE: ICD-10-CM

## 2019-10-30 DIAGNOSIS — Z12.5 SCREENING FOR MALIGNANT NEOPLASM OF PROSTATE: ICD-10-CM

## 2019-10-30 DIAGNOSIS — R63.1 POLYDIPSIA: ICD-10-CM

## 2019-10-30 LAB
ALBUMIN SERPL-MCNC: 4.3 G/DL (ref 3.5–5.2)
ALP BLD-CCNC: 45 U/L (ref 40–129)
ALT SERPL-CCNC: 79 U/L (ref 0–40)
ANION GAP SERPL CALCULATED.3IONS-SCNC: 14 MMOL/L (ref 7–16)
AST SERPL-CCNC: 47 U/L (ref 0–39)
BASOPHILS ABSOLUTE: 0.05 E9/L (ref 0–0.2)
BASOPHILS RELATIVE PERCENT: 0.5 % (ref 0–2)
BILIRUB SERPL-MCNC: 0.6 MG/DL (ref 0–1.2)
BUN BLDV-MCNC: 31 MG/DL (ref 8–23)
CALCIUM SERPL-MCNC: 9.9 MG/DL (ref 8.6–10.2)
CHLORIDE BLD-SCNC: 97 MMOL/L (ref 98–107)
CHOLESTEROL, TOTAL: 215 MG/DL (ref 0–199)
CO2: 22 MMOL/L (ref 22–29)
CREAT SERPL-MCNC: 1.4 MG/DL (ref 0.7–1.2)
EOSINOPHILS ABSOLUTE: 0.05 E9/L (ref 0.05–0.5)
EOSINOPHILS RELATIVE PERCENT: 0.5 % (ref 0–6)
GFR AFRICAN AMERICAN: >60
GFR NON-AFRICAN AMERICAN: >60 ML/MIN/1.73
GLUCOSE BLD-MCNC: 109 MG/DL (ref 74–99)
HBA1C MFR BLD: 5.8 % (ref 4–5.6)
HCT VFR BLD CALC: 44.9 % (ref 37–54)
HDLC SERPL-MCNC: 51 MG/DL
HEMOGLOBIN: 15.9 G/DL (ref 12.5–16.5)
IMMATURE GRANULOCYTES #: 0.03 E9/L
IMMATURE GRANULOCYTES %: 0.3 % (ref 0–5)
LDL CHOLESTEROL CALCULATED: 141 MG/DL (ref 0–99)
LYMPHOCYTES ABSOLUTE: 2.56 E9/L (ref 1.5–4)
LYMPHOCYTES RELATIVE PERCENT: 27.8 % (ref 20–42)
MCH RBC QN AUTO: 28.9 PG (ref 26–35)
MCHC RBC AUTO-ENTMCNC: 35.4 % (ref 32–34.5)
MCV RBC AUTO: 81.6 FL (ref 80–99.9)
MONOCYTES ABSOLUTE: 0.65 E9/L (ref 0.1–0.95)
MONOCYTES RELATIVE PERCENT: 7.1 % (ref 2–12)
NEUTROPHILS ABSOLUTE: 5.86 E9/L (ref 1.8–7.3)
NEUTROPHILS RELATIVE PERCENT: 63.8 % (ref 43–80)
PDW BLD-RTO: 14.4 FL (ref 11.5–15)
PLATELET # BLD: 201 E9/L (ref 130–450)
PMV BLD AUTO: 11.8 FL (ref 7–12)
POTASSIUM SERPL-SCNC: 4.7 MMOL/L (ref 3.5–5)
PROSTATE SPECIFIC ANTIGEN: 1.76 NG/ML (ref 0–4)
RBC # BLD: 5.5 E12/L (ref 3.8–5.8)
SODIUM BLD-SCNC: 133 MMOL/L (ref 132–146)
TOTAL PROTEIN: 8.3 G/DL (ref 6.4–8.3)
TRIGL SERPL-MCNC: 116 MG/DL (ref 0–149)
VLDLC SERPL CALC-MCNC: 23 MG/DL
WBC # BLD: 9.2 E9/L (ref 4.5–11.5)

## 2019-10-30 PROCEDURE — 99213 OFFICE O/P EST LOW 20 MIN: CPT | Performed by: FAMILY MEDICINE

## 2019-10-30 PROCEDURE — 80053 COMPREHEN METABOLIC PANEL: CPT

## 2019-10-30 PROCEDURE — 36415 COLL VENOUS BLD VENIPUNCTURE: CPT

## 2019-10-30 PROCEDURE — 85025 COMPLETE CBC W/AUTO DIFF WBC: CPT

## 2019-10-30 PROCEDURE — 83036 HEMOGLOBIN GLYCOSYLATED A1C: CPT

## 2019-10-30 PROCEDURE — G0103 PSA SCREENING: HCPCS

## 2019-10-30 PROCEDURE — 80061 LIPID PANEL: CPT

## 2019-10-30 RX ORDER — AMOXICILLIN AND CLAVULANATE POTASSIUM 875; 125 MG/1; MG/1
1 TABLET, FILM COATED ORAL 2 TIMES DAILY
Qty: 20 TABLET | Refills: 0 | Status: SHIPPED | OUTPATIENT
Start: 2019-10-30 | End: 2019-11-09

## 2019-10-30 ASSESSMENT — ENCOUNTER SYMPTOMS
NAUSEA: 1
WHEEZING: 0
DIARRHEA: 0
SHORTNESS OF BREATH: 0
EYE PAIN: 0
COUGH: 0
ABDOMINAL PAIN: 1
SORE THROAT: 0
CONSTIPATION: 0
SINUS PAIN: 0
BACK PAIN: 0
VOMITING: 1

## 2019-11-11 ENCOUNTER — TELEPHONE (OUTPATIENT)
Dept: FAMILY MEDICINE CLINIC | Age: 69
End: 2019-11-11

## 2019-11-26 ENCOUNTER — TELEPHONE (OUTPATIENT)
Dept: FAMILY MEDICINE CLINIC | Age: 69
End: 2019-11-26

## 2019-12-06 DIAGNOSIS — M48.061 SPINAL STENOSIS AT L4-L5 LEVEL: ICD-10-CM

## 2019-12-06 RX ORDER — GABAPENTIN 300 MG/1
300 CAPSULE ORAL 3 TIMES DAILY
Qty: 270 CAPSULE | Refills: 0 | Status: SHIPPED | OUTPATIENT
Start: 2019-12-06 | End: 2019-12-30 | Stop reason: SDUPTHER

## 2019-12-11 ENCOUNTER — TELEPHONE (OUTPATIENT)
Dept: FAMILY MEDICINE CLINIC | Age: 69
End: 2019-12-11

## 2019-12-26 ENCOUNTER — TELEPHONE (OUTPATIENT)
Dept: FAMILY MEDICINE CLINIC | Age: 69
End: 2019-12-26

## 2019-12-30 ENCOUNTER — OFFICE VISIT (OUTPATIENT)
Dept: FAMILY MEDICINE CLINIC | Age: 69
End: 2019-12-30
Payer: MEDICARE

## 2019-12-30 VITALS
WEIGHT: 241.6 LBS | BODY MASS INDEX: 32.72 KG/M2 | SYSTOLIC BLOOD PRESSURE: 120 MMHG | HEART RATE: 70 BPM | TEMPERATURE: 98 F | OXYGEN SATURATION: 96 % | DIASTOLIC BLOOD PRESSURE: 80 MMHG | HEIGHT: 72 IN

## 2019-12-30 DIAGNOSIS — M48.061 SPINAL STENOSIS AT L4-L5 LEVEL: ICD-10-CM

## 2019-12-30 DIAGNOSIS — N30.01 ACUTE CYSTITIS WITH HEMATURIA: Primary | ICD-10-CM

## 2019-12-30 DIAGNOSIS — N40.1 BENIGN PROSTATIC HYPERPLASIA WITH LOWER URINARY TRACT SYMPTOMS, SYMPTOM DETAILS UNSPECIFIED: ICD-10-CM

## 2019-12-30 PROCEDURE — 99213 OFFICE O/P EST LOW 20 MIN: CPT | Performed by: FAMILY MEDICINE

## 2019-12-30 RX ORDER — CIPROFLOXACIN 500 MG/1
500 TABLET, FILM COATED ORAL 2 TIMES DAILY
Qty: 20 TABLET | Refills: 0 | Status: SHIPPED | OUTPATIENT
Start: 2019-12-30 | End: 2020-01-06

## 2019-12-30 RX ORDER — CIPROFLOXACIN 500 MG/1
TABLET, FILM COATED ORAL
COMMUNITY
Start: 2019-12-28 | End: 2020-01-06

## 2019-12-30 RX ORDER — DILTIAZEM HYDROCHLORIDE 120 MG/1
CAPSULE, COATED, EXTENDED RELEASE ORAL
COMMUNITY
Start: 2019-12-17 | End: 2020-02-03

## 2019-12-30 RX ORDER — GABAPENTIN 300 MG/1
300 CAPSULE ORAL 3 TIMES DAILY
Qty: 270 CAPSULE | Refills: 0 | Status: SHIPPED | OUTPATIENT
Start: 2019-12-30 | End: 2020-01-06 | Stop reason: SDUPTHER

## 2019-12-30 ASSESSMENT — ENCOUNTER SYMPTOMS
COUGH: 0
BACK PAIN: 1
DIARRHEA: 0
SORE THROAT: 0
ABDOMINAL PAIN: 0
VOMITING: 0
SHORTNESS OF BREATH: 0
EYE PAIN: 0
WHEEZING: 0
SINUS PAIN: 0
CONSTIPATION: 0
NAUSEA: 0

## 2020-01-06 ENCOUNTER — OFFICE VISIT (OUTPATIENT)
Dept: FAMILY MEDICINE CLINIC | Age: 70
End: 2020-01-06
Payer: MEDICARE

## 2020-01-06 VITALS
BODY MASS INDEX: 32.91 KG/M2 | TEMPERATURE: 97.2 F | OXYGEN SATURATION: 97 % | HEART RATE: 72 BPM | SYSTOLIC BLOOD PRESSURE: 130 MMHG | WEIGHT: 243 LBS | DIASTOLIC BLOOD PRESSURE: 94 MMHG | HEIGHT: 72 IN

## 2020-01-06 PROBLEM — F33.1 DEPRESSION, MAJOR, RECURRENT, MODERATE (HCC): Status: ACTIVE | Noted: 2020-01-06

## 2020-01-06 PROCEDURE — 99214 OFFICE O/P EST MOD 30 MIN: CPT | Performed by: FAMILY MEDICINE

## 2020-01-06 RX ORDER — TAMSULOSIN HYDROCHLORIDE 0.4 MG/1
0.4 CAPSULE ORAL NIGHTLY
Qty: 90 CAPSULE | Refills: 1
Start: 2020-01-06 | End: 2020-02-03

## 2020-01-06 RX ORDER — GABAPENTIN 300 MG/1
300 CAPSULE ORAL 3 TIMES DAILY
Qty: 270 CAPSULE | Refills: 0 | Status: SHIPPED
Start: 2020-01-06 | End: 2020-06-08 | Stop reason: SDUPTHER

## 2020-01-06 RX ORDER — SILDENAFIL 100 MG/1
100 TABLET, FILM COATED ORAL DAILY PRN
Qty: 10 TABLET | Refills: 0 | Status: SHIPPED
Start: 2020-01-06 | End: 2020-09-15 | Stop reason: HOSPADM

## 2020-01-06 RX ORDER — SERTRALINE HYDROCHLORIDE 100 MG/1
100 TABLET, FILM COATED ORAL DAILY
Qty: 30 TABLET | Refills: 1 | Status: SHIPPED | OUTPATIENT
Start: 2020-01-06 | End: 2020-02-03 | Stop reason: SDUPTHER

## 2020-01-06 RX ORDER — LISINOPRIL 40 MG/1
40 TABLET ORAL DAILY
Qty: 90 TABLET | Refills: 1 | Status: SHIPPED
Start: 2020-01-06 | End: 2021-01-05 | Stop reason: SDUPTHER

## 2020-01-06 NOTE — PROGRESS NOTES
(NEURONTIN) 300 MG capsule; Take 1 capsule by mouth 3 times daily for 90 days. -     sildenafil (VIAGRA) 100 MG tablet; Take 1 tablet by mouth daily as needed for Erectile Dysfunction  -     lisinopril (PRINIVIL;ZESTRIL) 40 MG tablet; Take 1 tablet by mouth daily For blood pressure  -     tamsulosin (FLOMAX) 0.4 MG capsule; Take 1 capsule by mouth nightly    F/u 1 month  Patient counseled to follow up sooner or seek more acute care if symptoms worsening. Electronically signed by Darby Flowers MD on 1/9/2020    This note may have been created using dictation software.  Efforts were made to reduce grammatical or syntax errors, but some may persist.

## 2020-01-09 ENCOUNTER — OFFICE VISIT (OUTPATIENT)
Dept: FAMILY MEDICINE CLINIC | Age: 70
End: 2020-01-09
Payer: MEDICARE

## 2020-01-09 VITALS
SYSTOLIC BLOOD PRESSURE: 132 MMHG | HEART RATE: 83 BPM | DIASTOLIC BLOOD PRESSURE: 90 MMHG | TEMPERATURE: 97.6 F | BODY MASS INDEX: 32.82 KG/M2 | WEIGHT: 242 LBS | OXYGEN SATURATION: 97 %

## 2020-01-09 PROCEDURE — 99213 OFFICE O/P EST LOW 20 MIN: CPT | Performed by: PHYSICIAN ASSISTANT

## 2020-01-09 ASSESSMENT — ENCOUNTER SYMPTOMS
EYES NEGATIVE: 1
RESPIRATORY NEGATIVE: 1

## 2020-01-09 NOTE — TELEPHONE ENCOUNTER
Bret calling when he was in a couple days ago he forgot to have you check his mouth. About a month ago when he used the albuterol inhaler it burned his mouth, left a bad taste and he cannot sleep at night. The inhaler did help his shortness of breath. He stopped using it a week ago, the symptoms did not go away.  Please advise

## 2020-01-09 NOTE — TELEPHONE ENCOUNTER
Then needs seen for this as we didn't address it previously and its a problem that hasnt been evaluated yet.

## 2020-01-09 NOTE — PROGRESS NOTES
Chief Complaint   Patient presents with    Other     tongue and inside of mouth burning started about a week ago       HPI:  Patient presents today for    His tongue after he used albuterol. He just started this. Tongue feels thick. Whitish discharge. Current Outpatient Medications:     nystatin (MYCOSTATIN) [id number] UNIT/ML suspension, Take 5 mLs by mouth 4 times daily, Disp: 1 Bottle, Rfl: 0    gabapentin (NEURONTIN) 300 MG capsule, Take 1 capsule by mouth 3 times daily for 90 days. , Disp: 270 capsule, Rfl: 0    sildenafil (VIAGRA) 100 MG tablet, Take 1 tablet by mouth daily as needed for Erectile Dysfunction, Disp: 10 tablet, Rfl: 0    lisinopril (PRINIVIL;ZESTRIL) 40 MG tablet, Take 1 tablet by mouth daily For blood pressure, Disp: 90 tablet, Rfl: 1    tamsulosin (FLOMAX) 0.4 MG capsule, Take 1 capsule by mouth nightly, Disp: 90 capsule, Rfl: 1    sertraline (ZOLOFT) 100 MG tablet, Take 1 tablet by mouth daily For mood. , Disp: 30 tablet, Rfl: 1    diltiazem (CARDIZEM CD) 120 MG extended release capsule, TAKE ONE CAPSULE BY MOUTH EVERY DAY, Disp: , Rfl:     albuterol sulfate  (90 Base) MCG/ACT inhaler, Inhale 2 puffs into the lungs 4 times daily as needed for Wheezing, Disp: 1 Inhaler, Rfl: 5    metoprolol succinate (TOPROL XL) 50 MG extended release tablet, TAKE 1 TABLET BY MOUTH DAILY, Disp: 90 tablet, Rfl: 1    amLODIPine (NORVASC) 10 MG tablet, TAKE 1 TABLET BY MOUTH EVERY DAY for blood pressure, Disp: 90 tablet, Rfl: 1    aspirin (ASPIRIN 81) 81 MG chewable tablet, Take by mouth, Disp: , Rfl:     meloxicam (MOBIC) 15 MG tablet, Take 1 tablet by mouth daily as needed for Pain, Disp: 30 tablet, Rfl: 3       Allergies   Allergen Reactions    Bee Venom     Naproxen     Terazosin Hives    Meloxicam Nausea And Vomiting         Review of Systems  Review of Systems   Constitutional: Negative. HENT: Negative. Eyes: Negative. Respiratory: Negative. Cardiovascular: Negative.

## 2020-02-03 ENCOUNTER — OFFICE VISIT (OUTPATIENT)
Dept: FAMILY MEDICINE CLINIC | Age: 70
End: 2020-02-03
Payer: MEDICARE

## 2020-02-03 VITALS
DIASTOLIC BLOOD PRESSURE: 98 MMHG | OXYGEN SATURATION: 96 % | WEIGHT: 245.6 LBS | HEIGHT: 72 IN | BODY MASS INDEX: 33.26 KG/M2 | HEART RATE: 75 BPM | SYSTOLIC BLOOD PRESSURE: 152 MMHG | TEMPERATURE: 97.5 F

## 2020-02-03 PROCEDURE — 99214 OFFICE O/P EST MOD 30 MIN: CPT | Performed by: FAMILY MEDICINE

## 2020-02-03 RX ORDER — METOPROLOL SUCCINATE 50 MG/1
50 TABLET, EXTENDED RELEASE ORAL DAILY
COMMUNITY
End: 2020-07-07

## 2020-02-03 RX ORDER — SERTRALINE HYDROCHLORIDE 100 MG/1
100 TABLET, FILM COATED ORAL DAILY
Qty: 30 TABLET | Refills: 5 | Status: SHIPPED
Start: 2020-02-03 | End: 2021-01-05 | Stop reason: SDUPTHER

## 2020-02-03 RX ORDER — TAMSULOSIN HYDROCHLORIDE 0.4 MG/1
0.4 CAPSULE ORAL NIGHTLY
Qty: 30 CAPSULE | Refills: 5 | Status: CANCELLED | OUTPATIENT
Start: 2020-02-03

## 2020-02-03 RX ORDER — DILTIAZEM HYDROCHLORIDE 120 MG/1
CAPSULE, COATED, EXTENDED RELEASE ORAL
Qty: 30 CAPSULE | Refills: 5 | Status: CANCELLED | OUTPATIENT
Start: 2020-02-03

## 2020-02-03 RX ORDER — METOPROLOL SUCCINATE 50 MG/1
TABLET, EXTENDED RELEASE ORAL
Qty: 30 TABLET | Refills: 1 | Status: CANCELLED | OUTPATIENT
Start: 2020-02-03

## 2020-02-03 RX ORDER — ISOSORBIDE MONONITRATE 60 MG/1
TABLET, EXTENDED RELEASE ORAL
COMMUNITY
Start: 2020-01-31 | End: 2020-10-16

## 2020-02-03 RX ORDER — TAMSULOSIN HYDROCHLORIDE 0.4 MG/1
0.4 CAPSULE ORAL DAILY
COMMUNITY
End: 2020-08-10 | Stop reason: SDUPTHER

## 2020-02-03 RX ORDER — DILTIAZEM HYDROCHLORIDE 120 MG/1
120 CAPSULE, COATED, EXTENDED RELEASE ORAL DAILY
COMMUNITY
End: 2020-04-13

## 2020-02-03 RX ORDER — BUSPIRONE HYDROCHLORIDE 7.5 MG/1
7.5 TABLET ORAL 2 TIMES DAILY
Qty: 60 TABLET | Refills: 0 | Status: SHIPPED
Start: 2020-02-03 | End: 2020-03-04

## 2020-02-03 NOTE — PROGRESS NOTES
[facility]  Office Progress Note - Dr. Giovanny Wan  2/3/20    Chief Complaint   Patient presents with   Terrace Tyree     f/u from urgent care for thrush    Anxiety        S: Patient presents today for a variety of complaints, but he is actually here to talk about anxiety and depression. Says that his mood has significantly worsened over the past month. He was here about a month ago and we increased his Zoloft dose to 100 mg daily. He says that since having increased financial difficulties, his mood has significantly worsened. His wife told him to schedule an appointment so he is here. He says that a pension or some sort of funding was taken away from him. He is not able to make ends meet on the reduced income. This is caused significant stress. He feels he does not enjoy things that he usually does. He is not sleeping. He is frequently worrying. He wants to get back to his normal self. He continues to follow with a behavioral health care prescriber. He also has a counselor there. We discussed that as a , he could use his VA benefits to cover the cost of his medications. He said that he actually has an appointment coming up with the [place] in a week or 2. Dr. Aguila Rowe. Hypertension  Follow-up  Blood pressure is not controlled today. BP Readings from Last 3 Encounters:   02/03/20 (!) 152/98   01/09/20 (!) 132/90   01/06/20 (!) 130/94     Patient continues medications regularly. Compliance is good. Denies CP, sob, abd pain, headaches, vision changes, dizziness, hypotensive symptoms. No side effects from medications noted.       Family History   Problem Relation Age of Onset    High Blood Pressure Father     Diabetes Sister     Heart Disease Brother     Diabetes Sister     Diabetes Sister        Past Surgical History:   Procedure Laterality Date    CARDIAC PACEMAKER PLACEMENT  2014    CHOLECYSTECTOMY  1999    VENTRICULOPERITONEAL [address]       Social History I advised him that he could get his medications through the [address] I coordinated with the [facility] to send the medication list after reconciling it with his home medications. This may help give him some financial relief and help with his current deterioration of mood. Also help improve his health because he is not taking some of his medications right now due to cost.    Follow-up in 1 month, sooner as needed  Patient counseled to follow up sooner or seek more acute care if symptoms worsening. Electronically signed by Lexie Correa MD on 2/11/2020  Please note that >25 minutes was spent face-to-face with patient gathering history, performing physical exam, discussing findings, counseling patient, and determining plan forward. All questions addressed and answered. This note may have been created using dictation software.  Efforts were made to reduce grammatical or syntax errors, but some may persist.

## 2020-02-05 ENCOUNTER — TELEPHONE (OUTPATIENT)
Dept: FAMILY MEDICINE CLINIC | Age: 70
End: 2020-02-05

## 2020-02-05 NOTE — TELEPHONE ENCOUNTER
Patient calling in. He just had a quick question. He wants to know if you have had the chance to call  from the [facility] about his medications?    Please advise, thank you

## 2020-02-06 ENCOUNTER — TELEPHONE (OUTPATIENT)
Dept: FAMILY MEDICINE CLINIC | Age: 70
End: 2020-02-06

## 2020-02-06 NOTE — TELEPHONE ENCOUNTER
Pt still not home to go over meds. He states that he does have an updated med list from his pharmacy that he will call in and report to us. Awaiting phone call.

## 2020-02-06 NOTE — TELEPHONE ENCOUNTER
Per patient these are the meds he would like to get filled through the [place]:    Sildenafil 100mg 1 tab prn sexual activity  Diltiazem 120mg 1 qd  Flomax 0.4mg 1 qd  Amlodipine 10mg 1 qd  Gabapentin 100mg 3 tabs tid  HCTZ 50mg 1 qd  Isosorbide 60mg 2 tabs qd  Lisinopril 40mg 1 qd  Metoprolol Succinate 50mg 1 qd  Omeprazole 20mg 1 qd.

## 2020-02-07 NOTE — TELEPHONE ENCOUNTER
Please fax the list of reconciled meds to [facility], Jozef Parikh / Dr. Kerry Morrissey regarding telephone call yesterday.

## 2020-02-07 NOTE — TELEPHONE ENCOUNTER
Current medications, reconciled as of 02/06/20:      Sildenafil 100mg 1 tab prn sexual activity - erectile dysfunction  Diltiazem 120mg 1 qd - Hx heart disease - ? Symptomatic bradycardia and pacemaker - follows with Dr. Buffy Nugent in [place]. Flomax 0.4mg 1 qd - BPH  Amlodipine 10mg 1 qd - HTN  Gabapentin 100mg 3 tabs tid - Chronic radicular low back pain with sciatica  HCTZ 50mg 1 qd - HTN  Isosorbide 60mg 2 tabs qd -  Hx heart disease - ? Symptomatic bradycardia and pacemaker - follows with Dr. Buffy Nugent in [place]. Lisinopril 40mg 1 qd - HTN - HTN  Metoprolol Succinate 50mg 1 qd - Hx heart disease - ? Symptomatic bradycardia and pacemaker - follows with Dr. Buffy Nugent in [place]. Omeprazole 20mg 1 qd.  - Gastritis / GERD

## 2020-02-10 NOTE — TELEPHONE ENCOUNTER
Bret calling the [place] did not receive the faxed med list. Could you send again ?  Fax is [phone]

## 2020-02-25 ENCOUNTER — TELEPHONE (OUTPATIENT)
Dept: FAMILY MEDICINE CLINIC | Age: 70
End: 2020-02-25

## 2020-03-04 ENCOUNTER — OFFICE VISIT (OUTPATIENT)
Dept: FAMILY MEDICINE CLINIC | Age: 70
End: 2020-03-04
Payer: MEDICARE

## 2020-03-04 ENCOUNTER — HOSPITAL ENCOUNTER (OUTPATIENT)
Age: 70
Discharge: HOME | End: 2020-03-06
Payer: MEDICARE

## 2020-03-04 VITALS
SYSTOLIC BLOOD PRESSURE: 142 MMHG | WEIGHT: 241 LBS | BODY MASS INDEX: 32.64 KG/M2 | TEMPERATURE: 96.9 F | HEART RATE: 74 BPM | HEIGHT: 72 IN | OXYGEN SATURATION: 94 % | DIASTOLIC BLOOD PRESSURE: 96 MMHG

## 2020-03-04 LAB
BILIRUBIN, POC: NORMAL
BLOOD URINE, POC: NORMAL
CLARITY, POC: YELLOW
COLOR, POC: CLEAR
GLUCOSE URINE, POC: NORMAL
KETONES, POC: NORMAL
LEUKOCYTE EST, POC: NORMAL
NITRITE, POC: NORMAL
PH, POC: 5.5
PROTEIN, POC: NORMAL
SPECIFIC GRAVITY, POC: 1.02
UROBILINOGEN, POC: 0.2

## 2020-03-04 PROCEDURE — 87088 URINE BACTERIA CULTURE: CPT

## 2020-03-04 PROCEDURE — 99214 OFFICE O/P EST MOD 30 MIN: CPT | Performed by: FAMILY MEDICINE

## 2020-03-04 PROCEDURE — 81001 URINALYSIS AUTO W/SCOPE: CPT

## 2020-03-04 PROCEDURE — 81002 URINALYSIS NONAUTO W/O SCOPE: CPT | Performed by: FAMILY MEDICINE

## 2020-03-04 RX ORDER — HYDROCHLOROTHIAZIDE 50 MG/1
TABLET ORAL
COMMUNITY
Start: 2020-02-05 | End: 2020-05-15

## 2020-03-04 RX ORDER — BUSPIRONE HYDROCHLORIDE 15 MG/1
7.5 TABLET ORAL 2 TIMES DAILY
COMMUNITY
Start: 2020-02-03 | End: 2020-06-08 | Stop reason: SDUPTHER

## 2020-03-04 RX ORDER — AMLODIPINE BESYLATE 10 MG/1
TABLET ORAL
COMMUNITY
Start: 2020-02-05 | End: 2020-05-15

## 2020-03-04 NOTE — PROGRESS NOTES
[facility]  Office Progress Note - Dr. Dmitriy Rios  3/4/20    Chief Complaint   Patient presents with    Insomnia    Shaking        S: anx improved  Taking Buspar. Seems to have helped a lot  Financial stresses have improved too. Considering move to [address] [place] [address], not certain, end of year if might happen. Feeling a lot better. Strong urine odor. No dysuria. Was better for a while but now back. No inc freq or urgency. Hypertension  Follow-up  Blood pressure is not quite controlled today. BP Readings from Last 3 Encounters:   20 (!) 142/96   20 (!) 152/98   20 (!) 132/90     Patient continues medications regularly. Compliance is good. Denies CP, sob, abd pain, headaches, vision changes, dizziness, hypotensive symptoms. No side effects from medications noted. Family History   Problem Relation Age of Onset    High Blood Pressure Father     Diabetes Sister     Heart Disease Brother     Diabetes Sister     Diabetes Sister        Past Surgical History:   Procedure Laterality Date    CARDIAC PACEMAKER PLACEMENT      CHOLECYSTECTOMY      VENTRICULOPERITONEAL SHUNT         Social History     Tobacco Use    Smoking status: Former Smoker     Packs/day: 1.00     Years: 1.00     Pack years: 1.00     Types: Cigarettes     Last attempt to quit:      Years since quittin.2    Smokeless tobacco: Never Used    Tobacco comment: quit 30 years   Substance Use Topics    Alcohol use: Yes     Comment: rare    Drug use: No       ROS:  No CP, No palpitations,   No sob, No cough,   No abd pain, No heartburn,   No headaches,   No tingling, No numbness, No weakness,   No bowel changes, No hematochezia, No melena,  No bladder changes, No hematuria  No skin rashes, No skin lesions. No vision changes, No hearing changes,   No polyuria, polydipsia, polyphagia. Stable mood. improved  ROS otherwise negative unless as listed in HPI.     Chart reviewed and needed for Erectile Dysfunction 10 tablet 0    lisinopril (PRINIVIL;ZESTRIL) 40 MG tablet Take 1 tablet by mouth daily For blood pressure 90 tablet 1    aspirin (ASPIRIN 81) 81 MG chewable tablet Take by mouth      busPIRone (BUSPAR) 15 MG tablet Take 7.5 mg by mouth 2 times daily       No current facility-administered medications on file prior to visit. Patient Active Problem List   Diagnosis Code    PTSD (post-traumatic stress disorder) F43.10    Depression F32.9    Pacemaker Z95.0    HTN (hypertension) I10    Spinal stenosis at L4-L5 level M48.061    Depression, major, recurrent, moderate ([facility] [address] F33.1       Assessment / Richie Razo was seen today for insomnia and shaking. Diagnoses and all orders for this visit:    Abnormal urine odor, new  -     POCT Urinalysis no Micro  Noted + mod blood on urine dip in office, will send for micro to confirm. Essential hypertension, borderline but high  Not quite at goal but improving through appt today. Continue same meds. Anxiety, improved  Improved with buspar use. Also have him in the [place] system now working with Dr. Azael Banks [place] medication dispensing. Asymptomatic microscopic hematuria, new, uncertain prognosis. -     Urinalysis with Microscopic; Future  -     URINE CULTURE; Future  Will ref to urology if confirmed on Microscopic and no infection on culture. Return in about 6 months (around 9/4/2020). Patient counseled to follow up sooner or seek more acute care if symptoms worsening. Electronically signed by Asia Laureano MD on 3/6/2020    This note may have been created using dictation software.  Efforts were made to reduce grammatical or syntax errors, but some may persist.

## 2020-03-05 LAB
BACTERIA: ABNORMAL /HPF
BILIRUBIN URINE: NEGATIVE
BLOOD, URINE: ABNORMAL
CLARITY: CLEAR
COLOR: YELLOW
EPITHELIAL CELLS, UA: ABNORMAL /HPF
GLUCOSE URINE: NEGATIVE MG/DL
KETONES, URINE: NEGATIVE MG/DL
LEUKOCYTE ESTERASE, URINE: NEGATIVE
NITRITE, URINE: NEGATIVE
PH UA: 5.5 (ref 5–9)
PROTEIN UA: ABNORMAL MG/DL
RBC UA: ABNORMAL /HPF (ref 0–2)
SPECIFIC GRAVITY UA: 1.02 (ref 1–1.03)
UROBILINOGEN, URINE: 0.2 E.U./DL
WBC UA: ABNORMAL /HPF (ref 0–5)

## 2020-03-07 LAB — URINE CULTURE, ROUTINE: NORMAL

## 2020-04-10 ENCOUNTER — TELEPHONE (OUTPATIENT)
Dept: FAMILY MEDICINE CLINIC | Age: 70
End: 2020-04-10

## 2020-04-10 NOTE — TELEPHONE ENCOUNTER
Patient calling in. Starting in January he noticed a dry mouth with a pin like pain on his tongue. He was seen in [facility] care by VENECIA Tomlinson on 1/09 and prescribed Nystatin. Patient states it improved slightly but not much. He still has dry mouth and tongue pain. He wants to know what you would recommend?

## 2020-04-13 ENCOUNTER — OFFICE VISIT (OUTPATIENT)
Dept: PRIMARY CARE CLINIC | Age: 70
End: 2020-04-13
Payer: MEDICARE

## 2020-04-13 VITALS
DIASTOLIC BLOOD PRESSURE: 70 MMHG | HEIGHT: 72 IN | TEMPERATURE: 97.5 F | BODY MASS INDEX: 31.85 KG/M2 | SYSTOLIC BLOOD PRESSURE: 110 MMHG | OXYGEN SATURATION: 98 % | HEART RATE: 55 BPM | WEIGHT: 235.13 LBS

## 2020-04-13 PROCEDURE — 99214 OFFICE O/P EST MOD 30 MIN: CPT | Performed by: FAMILY MEDICINE

## 2020-04-13 PROCEDURE — 93000 ELECTROCARDIOGRAM COMPLETE: CPT | Performed by: FAMILY MEDICINE

## 2020-04-13 RX ORDER — FLUORIDE TOOTHPASTE
5 TOOTHPASTE DENTAL PRN
Qty: 473 ML | Refills: 0 | Status: SHIPPED
Start: 2020-04-13 | End: 2020-07-07 | Stop reason: CLARIF

## 2020-04-13 NOTE — PROGRESS NOTES
release tablet Take 50 mg by mouth daily      tamsulosin (FLOMAX) 0.4 MG capsule Take 0.4 mg by mouth daily      sildenafil (VIAGRA) 100 MG tablet Take 1 tablet by mouth daily as needed for Erectile Dysfunction 10 tablet 0    lisinopril (PRINIVIL;ZESTRIL) 40 MG tablet Take 1 tablet by mouth daily For blood pressure 90 tablet 1    aspirin (ASPIRIN 81) 81 MG chewable tablet Take by mouth      gabapentin (NEURONTIN) 300 MG capsule Take 1 capsule by mouth 3 times daily for 90 days. 270 capsule 0     No current facility-administered medications on file prior to visit. Patient Active Problem List   Diagnosis Code    PTSD (post-traumatic stress disorder) F43.10    Depression F32.9    Pacemaker Z95.0    HTN (hypertension) I10    Spinal stenosis at L4-L5 level M48.061    Depression, major, recurrent, moderate ([facility] [address] F33.1       Alyson / Alpa Raegan was seen today for other and joint pain. Diagnoses and all orders for this visit:    Tongue pain  -     Mouthwashes (BIOTENE PBF) oral solution; Take 5 mLs by mouth as needed (dry mouth / pain) Swish and spit. No abn exam.   Nystatin not effective. No meds suspected. See Dentistry recommended. Bradycardia, Asx  -     EKG 12 Lead; Future  -     EKG 12 Lead  Improved during EKG (rate 76), but initially mid 50s. Stop diltiazem,   Continue metoprolol. Continue amlodipine. Follow up with cardiology. EKG reviewed and no acute changes when compared to 2 most recent EKGs. Bilateral chronic knee pain  Requests consult with new surgeon. Patient interested in replacement  Will continue to follow with pain mgmt for low back pain / norco.   NSAIDs have given him problem sin past per allergies. Keep reg fu  With me or cardio within next month. Has appt with cardio in 2 weeks. Patient counseled to follow up sooner or seek more acute care if symptoms worsening.      Electronically signed by Ivan Washington MD on 4/13/2020    This note may

## 2020-04-20 ENCOUNTER — TELEPHONE (OUTPATIENT)
Dept: FAMILY MEDICINE CLINIC | Age: 70
End: 2020-04-20

## 2020-05-15 ENCOUNTER — VIRTUAL VISIT (OUTPATIENT)
Dept: FAMILY MEDICINE CLINIC | Age: 70
End: 2020-05-15
Payer: MEDICARE

## 2020-05-15 VITALS — BODY MASS INDEX: 31.15 KG/M2 | WEIGHT: 230 LBS | HEIGHT: 72 IN

## 2020-05-15 PROCEDURE — 99442 PR PHYS/QHP TELEPHONE EVALUATION 11-20 MIN: CPT | Performed by: FAMILY MEDICINE

## 2020-05-15 RX ORDER — HYDROCODONE BITARTRATE AND ACETAMINOPHEN 5; 325 MG/1; MG/1
TABLET ORAL
COMMUNITY
Start: 2020-04-17 | End: 2020-07-07

## 2020-05-22 ENCOUNTER — TELEPHONE (OUTPATIENT)
Dept: NON INVASIVE DIAGNOSTICS | Age: 70
End: 2020-05-22

## 2020-05-22 NOTE — TELEPHONE ENCOUNTER
----- Message from Osiris Mistry DO sent at 5/22/2020  3:49 PM EDT -----  Regarding: RE:  Nataliya Pink set up a VV with me next week.  Thanks  ----- Message -----  From: Shira Mendoza  Sent: 5/22/2020   3:25 PM EDT  To: Osiris Mistry DO    Transmission report of the [facility]

## 2020-06-08 RX ORDER — GABAPENTIN 300 MG/1
300 CAPSULE ORAL 3 TIMES DAILY
Qty: 270 CAPSULE | Refills: 0 | Status: SHIPPED
Start: 2020-06-08 | End: 2020-08-10 | Stop reason: SDUPTHER

## 2020-06-08 RX ORDER — BUSPIRONE HYDROCHLORIDE 15 MG/1
7.5 TABLET ORAL 2 TIMES DAILY
Qty: 60 TABLET | Refills: 5 | Status: SHIPPED
Start: 2020-06-08 | End: 2020-09-03

## 2020-07-07 ENCOUNTER — OFFICE VISIT (OUTPATIENT)
Dept: FAMILY MEDICINE CLINIC | Age: 70
End: 2020-07-07
Payer: MEDICARE

## 2020-07-07 ENCOUNTER — TELEPHONE (OUTPATIENT)
Dept: ADMINISTRATIVE | Age: 70
End: 2020-07-07

## 2020-07-07 VITALS
BODY MASS INDEX: 30.52 KG/M2 | DIASTOLIC BLOOD PRESSURE: 84 MMHG | OXYGEN SATURATION: 96 % | WEIGHT: 225 LBS | SYSTOLIC BLOOD PRESSURE: 124 MMHG | HEART RATE: 66 BPM | TEMPERATURE: 97.7 F

## 2020-07-07 PROCEDURE — 99213 OFFICE O/P EST LOW 20 MIN: CPT | Performed by: FAMILY MEDICINE

## 2020-07-07 RX ORDER — CETIRIZINE HYDROCHLORIDE 10 MG/1
10 TABLET ORAL DAILY
Qty: 30 TABLET | Refills: 1 | Status: SHIPPED
Start: 2020-07-07 | End: 2021-06-17

## 2020-07-07 RX ORDER — CEFDINIR 300 MG/1
300 CAPSULE ORAL 2 TIMES DAILY
Qty: 20 CAPSULE | Refills: 0 | Status: SHIPPED | OUTPATIENT
Start: 2020-07-07 | End: 2020-07-17

## 2020-07-07 RX ORDER — FLUTICASONE PROPIONATE 50 MCG
1 SPRAY, SUSPENSION (ML) NASAL DAILY
Qty: 1 BOTTLE | Refills: 1 | Status: SHIPPED
Start: 2020-07-07 | End: 2021-06-17 | Stop reason: SDUPTHER

## 2020-07-07 ASSESSMENT — ENCOUNTER SYMPTOMS
SORE THROAT: 0
EYES NEGATIVE: 1
SINUS PAIN: 1
RHINORRHEA: 1
RESPIRATORY NEGATIVE: 1
SINUS PRESSURE: 1

## 2020-07-07 NOTE — PROGRESS NOTES
20  Kaylin Linares : 1950 Sex: male  Age: 69 y.o. Chief Complaint   Patient presents with    Sinus Problem       Patient is a 62-year-old black male with a chief complaint of sinus problem nasal congestion no sore throat some mild temporal headache and frontal.  He has had no fevers chills no nausea vomiting diarrhea no cough or shortness of breath. Review of Systems   Constitutional: Negative. HENT: Positive for congestion, postnasal drip, rhinorrhea, sinus pressure and sinus pain. Negative for sore throat. Eyes: Negative. Respiratory: Negative. Cardiovascular: Negative. Current Outpatient Medications:     cetirizine (ZYRTEC) 10 MG tablet, Take 1 tablet by mouth daily, Disp: 30 tablet, Rfl: 1    fluticasone (FLONASE ALLERGY RELIEF) 50 MCG/ACT nasal spray, 1 spray by Each Nostril route daily, Disp: 1 Bottle, Rfl: 1    cefdinir (OMNICEF) 300 MG capsule, Take 1 capsule by mouth 2 times daily for 10 days, Disp: 20 capsule, Rfl: 0    busPIRone (BUSPAR) 15 MG tablet, Take 7.5 mg by mouth 2 times daily, Disp: 60 tablet, Rfl: 5    gabapentin (NEURONTIN) 300 MG capsule, Take 1 capsule by mouth 3 times daily for 90 days. , Disp: 270 capsule, Rfl: 0    isosorbide mononitrate (IMDUR) 60 MG extended release tablet, TAKE TWO TABLETS BY MOUTH EVERY DAY, Disp: , Rfl:     sertraline (ZOLOFT) 100 MG tablet, Take 1 tablet by mouth daily For mood. , Disp: 30 tablet, Rfl: 5    tamsulosin (FLOMAX) 0.4 MG capsule, Take 0.4 mg by mouth daily, Disp: , Rfl:     sildenafil (VIAGRA) 100 MG tablet, Take 1 tablet by mouth daily as needed for Erectile Dysfunction, Disp: 10 tablet, Rfl: 0    lisinopril (PRINIVIL;ZESTRIL) 40 MG tablet, Take 1 tablet by mouth daily For blood pressure (Patient taking differently: Take 20 mg by mouth daily For blood pressure.), Disp: 90 tablet, Rfl: 1    aspirin (ASPIRIN 81) 81 MG chewable tablet, Take by mouth, Disp: , Rfl:   Allergies   Allergen Reactions    Bee for this visit:    Acute non-recurrent frontal sinusitis    Other orders  -     cetirizine (ZYRTEC) 10 MG tablet; Take 1 tablet by mouth daily  -     fluticasone (FLONASE ALLERGY RELIEF) 50 MCG/ACT nasal spray; 1 spray by Each Nostril route daily  -     cefdinir (OMNICEF) 300 MG capsule; Take 1 capsule by mouth 2 times daily for 10 days        Orders Placed This Encounter   Medications    cetirizine (ZYRTEC) 10 MG tablet     Sig: Take 1 tablet by mouth daily     Dispense:  30 tablet     Refill:  1    fluticasone (FLONASE ALLERGY RELIEF) 50 MCG/ACT nasal spray     Si spray by Each Nostril route daily     Dispense:  1 Bottle     Refill:  1    cefdinir (OMNICEF) 300 MG capsule     Sig: Take 1 capsule by mouth 2 times daily for 10 days     Dispense:  20 capsule     Refill:  0        Patient advised to follow up with PCP as needed.         Seen By:  Mitul Bowie DO

## 2020-07-07 NOTE — TELEPHONE ENCOUNTER
Pt states he missed a call from the office, has no message, might be about a an appt his dr set up, please call him back at [phone]

## 2020-07-08 NOTE — TELEPHONE ENCOUNTER
Bret calling he has not heard about his sleep study ordered in may. Per [facility] sleep lab he is set up for [email or web address], she will call him. I tried to call patient to inform and line is not accepting calls.  I left message for sister beatrice to inform

## 2020-08-10 ENCOUNTER — OFFICE VISIT (OUTPATIENT)
Dept: FAMILY MEDICINE CLINIC | Age: 70
End: 2020-08-10
Payer: MEDICARE

## 2020-08-10 ENCOUNTER — HOSPITAL ENCOUNTER (OUTPATIENT)
Age: 70
Discharge: HOME | End: 2020-08-12
Payer: MEDICARE

## 2020-08-10 VITALS
SYSTOLIC BLOOD PRESSURE: 142 MMHG | TEMPERATURE: 97.1 F | BODY MASS INDEX: 30.52 KG/M2 | WEIGHT: 225 LBS | OXYGEN SATURATION: 97 % | DIASTOLIC BLOOD PRESSURE: 90 MMHG | HEART RATE: 62 BPM

## 2020-08-10 LAB
ALBUMIN SERPL-MCNC: 4.1 G/DL (ref 3.5–5.2)
ALP BLD-CCNC: 45 U/L (ref 40–129)
ALT SERPL-CCNC: 57 U/L (ref 0–40)
ANION GAP SERPL CALCULATED.3IONS-SCNC: 15 MMOL/L (ref 7–16)
AST SERPL-CCNC: 42 U/L (ref 0–39)
BILIRUB SERPL-MCNC: 0.3 MG/DL (ref 0–1.2)
BUN BLDV-MCNC: 33 MG/DL (ref 8–23)
CALCIUM SERPL-MCNC: 9.5 MG/DL (ref 8.6–10.2)
CHLORIDE BLD-SCNC: 103 MMOL/L (ref 98–107)
CO2: 21 MMOL/L (ref 22–29)
CREAT SERPL-MCNC: 1.5 MG/DL (ref 0.7–1.2)
GFR AFRICAN AMERICAN: 56
GFR NON-AFRICAN AMERICAN: 56 ML/MIN/1.73
GLUCOSE BLD-MCNC: 97 MG/DL (ref 74–99)
POTASSIUM SERPL-SCNC: 4.5 MMOL/L (ref 3.5–5)
SODIUM BLD-SCNC: 139 MMOL/L (ref 132–146)
TOTAL PROTEIN: 7.6 G/DL (ref 6.4–8.3)

## 2020-08-10 PROCEDURE — 93000 ELECTROCARDIOGRAM COMPLETE: CPT | Performed by: FAMILY MEDICINE

## 2020-08-10 PROCEDURE — 36415 COLL VENOUS BLD VENIPUNCTURE: CPT

## 2020-08-10 PROCEDURE — 99214 OFFICE O/P EST MOD 30 MIN: CPT | Performed by: FAMILY MEDICINE

## 2020-08-10 PROCEDURE — 80053 COMPREHEN METABOLIC PANEL: CPT

## 2020-08-10 RX ORDER — TAMSULOSIN HYDROCHLORIDE 0.4 MG/1
0.4 CAPSULE ORAL DAILY
Qty: 30 CAPSULE | Refills: 2 | Status: SHIPPED
Start: 2020-08-10 | End: 2020-10-16 | Stop reason: SDUPTHER

## 2020-08-10 RX ORDER — GABAPENTIN 300 MG/1
300 CAPSULE ORAL 3 TIMES DAILY
Qty: 270 CAPSULE | Refills: 0 | Status: CANCELLED | OUTPATIENT
Start: 2020-08-10 | End: 2020-11-08

## 2020-08-10 RX ORDER — GABAPENTIN 300 MG/1
300 CAPSULE ORAL 3 TIMES DAILY
Qty: 270 CAPSULE | Refills: 0 | Status: SHIPPED
Start: 2020-08-10 | End: 2020-11-30 | Stop reason: SDUPTHER

## 2020-08-10 NOTE — PROGRESS NOTES
[facility]  Office Progress Note - Dr. Jane Galindo  8/10/20    Chief Complaint   Patient presents with    Follow-Up from Hospital     sign out AMA, pt states he blacked out, loss of memory, vomiting        HPI: Patient presents for evaluation 2 days after leaving [facility] emergency room [place]. He presented after a possible syncopal episode. Had 2 beers in morning with some buddies, first in a long time, went to his son's house, he was standing to get out of the car and he slid his legs out and went down. Doesn't remember anything. Maybe some shaking activity, convulsion probably more likely than seizure, and some spitting \"foaming at the mouth. \"  No seizure history. EMS to ED was next thing he remembered. He had a workup in the ED and he felt like wasn't making any progress and he left AMA. Over next 48 hours or so to now he has continued to feel better and better. \"  Today is the best I have ever felt in years. \"    Reviewing the emergency room work-up, he had a negative CT of his head with  shunt in place. Chest x-ray was normal.  He had some mild AK I with creatinine up to 1.9. Lactic acid was elevated at 3.7. D-dimer was elevated at 782. Urine was negative for drugs. Urinalysis was suspicious for small red blood cells, moderate hyaline casts, mild protein. No signs of infection. Chemistry panel showed minimal hyponatremia at 134. Mild hypomagnesemia at 1.5. Normal troponin. Normal liver function. Ethanol level is elevated at 0.109    Today he has a completely negative review of systems. EKG performed in the office shows an atrial rhythm which is likely paced. Bifascicular block -right bundle and left anterior fascicle. There are no ST elevations or depressions. He does have T wave inversions across the chest leads which is new compared to his most recent EKG from May 2020. He did have a negative stress test with his cardiologist in May 2020.   Normal imaging as place, and time. He appears well-developed and well-nourished. HENT:    Nose: Nose normal.    Mouth/Throat: Oropharynx is clear and moist.   Eyes:    Conjunctivae are normal.    Pupils are equal, round, and reactive to light. EOMI. Neck:    Normal range of motion. No thyromegaly or nodules noted. No bruit. Cardiovascular:    Normal rate, regular rhythm and normal heart sounds. No ectopy. No murmur. No gallop and no friction rub. Pulmonary/Chest:    Effort normal and breath sounds normal.    No wheezes. No rales or rhonchi. Abdominal:    Soft. Bowel sounds are normal.    No distension. No tenderness. Musculoskeletal:    Normal range of motion. No joint swelling noted. No peripheral edema. Skin:    Skin is warm and dry. No rashes, lesions. Psychiatric:    He has a normal mood and affect. Normal groom and dress. No SI or HI.   ________________________________________________________  Current Outpatient Medications on File Prior to Visit   Medication Sig Dispense Refill    cetirizine (ZYRTEC) 10 MG tablet Take 1 tablet by mouth daily 30 tablet 1    fluticasone (FLONASE ALLERGY RELIEF) 50 MCG/ACT nasal spray 1 spray by Each Nostril route daily 1 Bottle 1    busPIRone (BUSPAR) 15 MG tablet Take 7.5 mg by mouth 2 times daily 60 tablet 5    isosorbide mononitrate (IMDUR) 60 MG extended release tablet TAKE TWO TABLETS BY MOUTH EVERY DAY      sertraline (ZOLOFT) 100 MG tablet Take 1 tablet by mouth daily For mood. 30 tablet 5    sildenafil (VIAGRA) 100 MG tablet Take 1 tablet by mouth daily as needed for Erectile Dysfunction 10 tablet 0    lisinopril (PRINIVIL;ZESTRIL) 40 MG tablet Take 1 tablet by mouth daily For blood pressure (Patient taking differently: Take 20 mg by mouth daily For blood pressure.) 90 tablet 1    aspirin (ASPIRIN 81) 81 MG chewable tablet Take by mouth       No current facility-administered medications on file prior to visit.         Patient Active Problem List   Diagnosis Code    PTSD (post-traumatic stress disorder) F43.10    Depression F32.9    Pacemaker Z95.0    HTN (hypertension) I10    Spinal stenosis at L4-L5 level M48.061    Depression, major, recurrent, moderate ([facility] [address] F33.1     ________________________________________________________  Assessment / Rony Camera was seen today for follow-up from hospital.    Diagnoses and all orders for this visit:    Syncope, unspecified syncope type, new  -     EKG 12 lead; Future  -     EKG 12 lead  Probable syncope. May have been from dehydration and orthostatic changes. Sounds like he passed out upon standing from the car. He had had 2 beers that morning. He had some mild EDD in the emergency room and his lactic acid was elevated. Unfortunately he left AMA. EKG performed today did not show any acute changes suggestive of infarction, but he did have some T wave inversions which look new. His troponin was negative yesterday and he is asymptomatic right now. His cardiac exam is normal.    He happens to have an appointment with his cardiologist tomorrow, so I will have him keep that and review all of the above. Elevated serum creatinine  -     Comprehensive Metabolic Panel; Future  We will follow-up on elevated creatinine from the emergency room      Nocturia, complains of worsening symptoms  -Restart     tamsulosin (FLOMAX) 0.4 MG capsule; Take 1 capsule by mouth daily Night time urination help. He ran out of or stopped taking Flomax quite a while ago. We will restart. Keep upcoming appointment in early September. Call sooner with any changes  Patient counseled to follow up sooner or seek more acute care if symptoms worsening or not improving according to plan. .     Electronically signed by Rocio Mejia MD on 8/10/2020    This note may have been created using dictation software.  Efforts were made to reduce grammatical or syntax errors, but some may persist.

## 2020-08-10 NOTE — Clinical Note
Please forward progress note to Dr. Boby Lorenzana .   He has an appointment with him on August 11 (tomorrow)

## 2020-08-10 NOTE — TELEPHONE ENCOUNTER
Last Appointment:  5/15/2020  Future Appointments   Date Time Provider Torey Scanlon   8/21/2020  9:00 AM [address], MD [facility]   9/4/2020  1:40 PM Isma Kaiser MD [address] [place] [address]      Patient asking for refill on gabapentin

## 2020-09-02 NOTE — PROGRESS NOTES
[facility] and [facility] Electrophysiology  Consultation Report  PATIENT: Arpit Meadows  MEDICAL RECORD NUMBER: [id number]  DATE OF SERVICE:  9/3/2020  ATTENDING ELECTROPHYSIOLOGIST: Jason Ruffin MD  REFERRING PHYSICIAN: Anali Ceja MD and Shannon Flores MD  CHIEF COMPLAINT: Sustained ventricular tachycardia    HPI: This is a 69 y.o. male with a history of   Patient Active Problem List   Diagnosis    PTSD (post-traumatic stress disorder)    Depression    Pacemaker    HTN (hypertension)    Spinal stenosis at L4-L5 level    Depression, major, recurrent, moderate ([address]   who presents to cardiac electrophysiology clinic for consultation of sustained ventricular tachycardia. The patient has history of high grade AV block status post pacemaker implantation in March of 2014 , hypertension, hydrocephalus and RBBB. The   Patient was noted to have sustained ventricular tachycardia up to 5 minutes during pacemaker interrogation since May 2020. Last episode of sustained VT was 8/17/20. Echocardiogram 4/28/20 showed LV EF of 55-60%. Nuclear stress test on 4/28/20 showed no cardiac ischemia. The patient is currently taking Cardizem and reports that he might taking Metoprolol at home but he was not sure. No Metoprolol on his current list of medications. He reports syncopal episode on 8/8/20 when he presented to [facility] and left AMA. He states that he had 2 beers in morning and went to his son's house. While he was standing to get out of the car and he slid his legs out and went down. Doesn't remember anything after until in the EMS came. He is unaware of how long he was out for. Mr. Lulu Eric goes on to report SOBOE and chest discomfort with ambulation. The patient denies any orthopnea or paroxysmal nocturnal dyspnea. Discussed regarding CAG, WCD and possible ICD upgrade as a line of investigation and treatment.  Advised ED visit and evaluation now due to recurrent VT which he defers. He is aware of risk of SCD. Patient Active Problem List    Diagnosis Date Noted    Depression, major, recurrent, moderate ([facility] [address] 2020    Spinal stenosis at L4-L5 level 2015    PTSD (post-traumatic stress disorder) 2014    Depression 2014    Pacemaker 2014    HTN (hypertension) 2014       Past Medical History:   Diagnosis Date    Cerebral artery occlusion with cerebral infarction ([facility] [address]     Chronic back pain     Depression     Hyperlipidemia     Hypertension     Pacemaker     Sleep apnea     TIA (transient ischemic attack)     Ventricular tachycardia ([facility] [address]        Family History   Problem Relation Age of Onset    High Blood Pressure Father     Diabetes Sister     Heart Disease Brother     Diabetes Sister     Diabetes Sister        Social History     Tobacco Use    Smoking status: Former Smoker     Packs/day: 1.00     Years: 1.00     Pack years: 1.00     Types: Cigarettes     Last attempt to quit:      Years since quittin.6    Smokeless tobacco: Never Used    Tobacco comment: quit 30 years   Substance Use Topics    Alcohol use: Yes     Comment: rare       Current Outpatient Medications   Medication Sig Dispense Refill    dilTIAZem (CARDIZEM CD) 180 MG extended release capsule TAKE ONE CAPSULE BY MOUTH TWICE DAILY (STOP amlodipine)      HYDROcodone-acetaminophen (NORCO) 7.5-325 MG per tablet TAKE ONE TABLET BY MOUTH FOUR TIMES DAILY AS NEEDED FOR PAIN      busPIRone (BUSPAR) 10 MG tablet Take 15 mg by mouth       gabapentin (NEURONTIN) 300 MG capsule Take 1 capsule by mouth 3 times daily for 90 days. (Patient taking differently: Take 300 mg by mouth 3 times daily as needed. ) 270 capsule 0    tamsulosin (FLOMAX) 0.4 MG capsule Take 1 capsule by mouth daily Night time urination help.  30 capsule 2    cetirizine (ZYRTEC) 10 MG tablet Take 1 tablet by mouth daily 30 tablet 1    fluticasone (FLONASE ALLERGY RELIEF) 50 MCG/ACT nasal spray 1 spray by Each Nostril route daily 1 Bottle 1    isosorbide mononitrate (IMDUR) 60 MG extended release tablet TAKE TWO TABLETS BY MOUTH EVERY DAY      sertraline (ZOLOFT) 100 MG tablet Take 1 tablet by mouth daily For mood. 30 tablet 5    sildenafil (VIAGRA) 100 MG tablet Take 1 tablet by mouth daily as needed for Erectile Dysfunction 10 tablet 0    lisinopril (PRINIVIL;ZESTRIL) 40 MG tablet Take 1 tablet by mouth daily For blood pressure (Patient taking differently: Take 20 mg by mouth daily For blood pressure.) 90 tablet 1    aspirin (ASPIRIN 81) 81 MG chewable tablet Take by mouth daily        No current facility-administered medications for this visit. Allergies   Allergen Reactions    Bee Venom     Naproxen     Meloxicam Nausea And Vomiting       ROS:   Constitutional: Negative for fever, activity change and appetite change. HENT: Negative for epistaxis. Eyes: Negative for diploplia, blurred vision. Respiratory: Negative for cough. Positive for chest tightness and hortness of breath. Negative of wheezing. Cardiovascular: pertinent positives in HPI  Gastrointestinal: Negative for abdominal pain and blood in stool. All other review of systems are negative     PHYSICAL EXAM:  Vitals:    09/03/20 1402   BP: 110/80   Pulse: 70   Resp: 16   Temp: 98.2 °F (36.8 °C)   Weight: 223 lb (101.2 kg)   Height: 5' 11\" (1.803 m)      Constitutional: Well-developed, no acute distress  Eyes: conjunctivae normal, no xanthelasma   Ears, Nose, Throat: oral mucosa moist, no cyanosis   CV: no JVD. Regular rate and rhythm. Normal S1S2 and no S3. No murmurs, rubs, or gallops. PMI is nondisplaced  Lungs: clear to auscultation bilaterally, normal respiratory effort without used of accessory muscles  Abdomen: soft, non-tender, bowel sounds present, no masses or hepatomegaly   Musculoskeletal: no digital clubbing, no edema   Skin: warm, no rashes   Pacemaker site: well healed.  No erosion, infection or migration. I have personally reviewed the laboratory, cardiac diagnostic and radiographic testing as outlined below:    Data:    No results for input(s): WBC, HGB, HCT, PLT in the last 72 hours. No results for input(s): NA, K, CL, CO2, BUN, CREATININE, CALCIUM in the last 72 hours. Invalid input(s): GLU, MAGNESIUM   Lab Results   Component Value Date    MG 1.5 2020     No results for input(s): TSH in the last 72 hours. No results for input(s): INR in the last 72 hours. CXR 20:   FINDINGS:  Suboptimal inspiratory result reduces the sensitivity of the study.  The heart size is       enlarged but visually stable from prior.  Some aortic ectasia unchanged from prior. Elveria Negus lung       fields are clear. No infiltrates or effusions are identified.    Left-sided pacemaker stable from       prior.               IMPRESSION XR chest 1V portable:       No acute cardiopulmonary disease.  Cardiomegaly and aortic ectasia similar appearance to prior.          EK/3/20: A paced with RBBB rate: 70 bpm, PVC, QTc 447 msec- Please see scan in Cardiology. Echocardiogram 20:     Stress Test 20:     Device Interrogation:   Underlying rhythm: AsVs  Mode: DDDR   Battery Voltage/Longevity:  2.5 years    Pacing: A: 58%  RV: 10%    P wave: 2.7 mV  Impedance: 375 ohms   Threshold: 0.6 V @ 0.5 ms  RV R wave: 22.4 mV  Impedance: 440 ohms   Threshold: 0.5 V @ 0.4 ms  Episodes: AF burden: <1%  - 885 nonsustained ventricular events     - 42 SVT episodes    - 334 VT episodes  Reprogramming included: see below  Overall device function is normal    All device programmable settings were evaluated per above and in the scanned document, along with iterative adjustments (capture thresholds) to assess and select the most appropriate final programming to provide for consistent delivery of the appropriate therapy and to verify function of the device.      I have independently reviewed all of the ECGs and rhythm strips per above     Assessment/Plan: This is a 69 y.o. male with a history of   Patient Active Problem List   Diagnosis    PTSD (post-traumatic stress disorder)    Depression    Pacemaker    HTN (hypertension)    Spinal stenosis at L4-L5 level    Depression, major, recurrent, moderate ([address]    who presents with sustained ventricular tachycardia. 1. Sustained ventricular tachycardia  - Documented up to 4 minutes on pacemaker interrogation.  - Echocardiogram 4/28/20 showed LV EF of 55-60%. - Nuclear stress test 4/28/20 showed no ischemia.  - Consider start Beta-blocker if no contraindication.  - Will schedule coronary angiogram to exclude significant CAD. Discussed with Dr. Rhiannon Soriano.  - Consider ICD upgrade if CAG shows no significant CAD. - Will prescribe LifeVest in the mean time. 2. Pacemaker in situ  - Implanted in March if 2014  - Indication high grade AV block. - Normal device function. 3. High grade AV block   - Status post pacemaker implantation in March of 2014     4. Hypertension  - Well controlled. - On Cardizem, Zestril and Imdur. 5. Hydrocephalus    6. RBBB  - Chronic. 7. Syncope  - Can not exclude from arrhythmic cause. Recommendations:  1. Coronary angiogram to exclude significant coronary artery disease. Discussed with Dr. Rhiannon Soriano who will arrange the procedure. 2. Patient to call the office with his current medication list. Would like to discontinue Cardizem and start Toprol XL if no definite contraindication. 3. Will prescribe WCD LifeVest in the mean time while await for CAG. LifeVest ordered. 4. Consider ICD upgrade if CAG shows no significant CAD. 5. Follow up after the procedure or in 3 months. Encouraged the patient to call the office for any questions or concerns. I have spent a total of 60 minutes with the patient and the family reviewing the above stated recommendations.   And a total of >50% of that time involved face-to-face time providing counseling and or coordination of care with the other providers. Thank you for allowing me to participate in your patient's care. Please call me if there are any questions or concerns.       Hilario Carcamo MD  Cardiac Electrophysiology  [address]  [facility] [place] [facility]  2:35 PM  9/3/2020

## 2020-09-03 ENCOUNTER — TELEPHONE (OUTPATIENT)
Dept: NON INVASIVE DIAGNOSTICS | Age: 70
End: 2020-09-03

## 2020-09-03 ENCOUNTER — OFFICE VISIT (OUTPATIENT)
Dept: NON INVASIVE DIAGNOSTICS | Age: 70
End: 2020-09-03
Payer: MEDICARE

## 2020-09-03 VITALS
BODY MASS INDEX: 31.22 KG/M2 | HEIGHT: 71 IN | TEMPERATURE: 98.2 F | SYSTOLIC BLOOD PRESSURE: 110 MMHG | RESPIRATION RATE: 16 BRPM | HEART RATE: 70 BPM | WEIGHT: 223 LBS | DIASTOLIC BLOOD PRESSURE: 80 MMHG

## 2020-09-03 PROCEDURE — 93000 ELECTROCARDIOGRAM COMPLETE: CPT | Performed by: INTERNAL MEDICINE

## 2020-09-03 PROCEDURE — 99205 OFFICE O/P NEW HI 60 MIN: CPT | Performed by: INTERNAL MEDICINE

## 2020-09-03 PROCEDURE — 93280 PM DEVICE PROGR EVAL DUAL: CPT | Performed by: INTERNAL MEDICINE

## 2020-09-03 RX ORDER — HYDROCODONE BITARTRATE AND ACETAMINOPHEN 7.5; 325 MG/1; MG/1
TABLET ORAL
COMMUNITY
Start: 2020-08-12 | End: 2021-02-18

## 2020-09-03 RX ORDER — METOPROLOL SUCCINATE 50 MG/1
50 TABLET, EXTENDED RELEASE ORAL 2 TIMES DAILY
Qty: 180 TABLET | Refills: 1 | Status: SHIPPED
Start: 2020-09-03 | End: 2020-09-03

## 2020-09-03 RX ORDER — BUSPIRONE HYDROCHLORIDE 10 MG/1
15 TABLET ORAL
COMMUNITY
Start: 2020-08-20 | End: 2021-01-05

## 2020-09-03 RX ORDER — DILTIAZEM HYDROCHLORIDE 180 MG/1
CAPSULE, COATED, EXTENDED RELEASE ORAL
COMMUNITY
Start: 2020-05-28 | End: 2020-09-03 | Stop reason: ALTCHOICE

## 2020-09-03 NOTE — TELEPHONE ENCOUNTER
Spoke to Yasmin Salcido at [facility] regarding WCD order. Yasmin Salcido was given the patient's information and per Yasmin Salcido he will reach out to the patient for the LifeVest fitting.

## 2020-09-04 ENCOUNTER — TELEPHONE (OUTPATIENT)
Dept: NON INVASIVE DIAGNOSTICS | Age: 70
End: 2020-09-04

## 2020-09-04 RX ORDER — METOPROLOL TARTRATE 50 MG/1
50 TABLET, FILM COATED ORAL 2 TIMES DAILY
Status: ON HOLD | COMMUNITY
End: 2020-09-24 | Stop reason: HOSPADM

## 2020-09-04 NOTE — TELEPHONE ENCOUNTER
Patient was seen by Dr. Jackline Fleischer on 9.3.2020. Plan:   Pacemaker is being followed by Dr. Romero Guthrie. Per Dr. Jackline Fleischer:   Weekly remotes to monitor for VT:       Enrolled in our [facility] as a secondary provider so we can monitor the patient weekly only for VT. Left message on pacemaker nurse machine at Dr. Zac Herrera office to inform nurse that we will be doing weekly remotes.        Richarda [phone]

## 2020-09-04 NOTE — TELEPHONE ENCOUNTER
Per Dr. Yoav Ng I instructed him to increase metoprolol tart to 50mg bid. He verbalized understanding and has enough of the 25mg tablets to double up on the dose. He will call me and let me know when he needs a refill. He gets his meds from the [facility].

## 2020-09-09 ENCOUNTER — HOSPITAL ENCOUNTER (OUTPATIENT)
Age: 70
Discharge: HOME | End: 2020-09-11
Payer: MEDICARE

## 2020-09-09 LAB
ANION GAP SERPL CALCULATED.3IONS-SCNC: 17 MMOL/L (ref 7–16)
APTT: 32 SEC (ref 24.5–35.1)
BASOPHILS ABSOLUTE: 0.06 E9/L (ref 0–0.2)
BASOPHILS RELATIVE PERCENT: 0.7 % (ref 0–2)
BUN BLDV-MCNC: 26 MG/DL (ref 8–23)
CALCIUM SERPL-MCNC: 10 MG/DL (ref 8.6–10.2)
CHLORIDE BLD-SCNC: 102 MMOL/L (ref 98–107)
CO2: 19 MMOL/L (ref 22–29)
CREAT SERPL-MCNC: 1.4 MG/DL (ref 0.7–1.2)
EOSINOPHILS ABSOLUTE: 0.09 E9/L (ref 0.05–0.5)
EOSINOPHILS RELATIVE PERCENT: 1 % (ref 0–6)
GFR AFRICAN AMERICAN: >60
GFR NON-AFRICAN AMERICAN: >60 ML/MIN/1.73
GLUCOSE BLD-MCNC: 93 MG/DL (ref 74–99)
HCT VFR BLD CALC: 41.4 % (ref 37–54)
HEMOGLOBIN: 15 G/DL (ref 12.5–16.5)
IMMATURE GRANULOCYTES #: 0.03 E9/L
IMMATURE GRANULOCYTES %: 0.3 % (ref 0–5)
INR BLD: 1
LYMPHOCYTES ABSOLUTE: 2.35 E9/L (ref 1.5–4)
LYMPHOCYTES RELATIVE PERCENT: 26.6 % (ref 20–42)
MCH RBC QN AUTO: 29.4 PG (ref 26–35)
MCHC RBC AUTO-ENTMCNC: 36.2 % (ref 32–34.5)
MCV RBC AUTO: 81 FL (ref 80–99.9)
MONOCYTES ABSOLUTE: 0.66 E9/L (ref 0.1–0.95)
MONOCYTES RELATIVE PERCENT: 7.5 % (ref 2–12)
NEUTROPHILS ABSOLUTE: 5.64 E9/L (ref 1.8–7.3)
NEUTROPHILS RELATIVE PERCENT: 63.9 % (ref 43–80)
PDW BLD-RTO: 13.5 FL (ref 11.5–15)
PLATELET # BLD: 200 E9/L (ref 130–450)
PMV BLD AUTO: 12 FL (ref 7–12)
POTASSIUM SERPL-SCNC: 3.9 MMOL/L (ref 3.5–5)
PROTHROMBIN TIME: 11.5 SEC (ref 9.3–12.4)
RBC # BLD: 5.11 E12/L (ref 3.8–5.8)
SODIUM BLD-SCNC: 138 MMOL/L (ref 132–146)
WBC # BLD: 8.8 E9/L (ref 4.5–11.5)

## 2020-09-09 PROCEDURE — 85730 THROMBOPLASTIN TIME PARTIAL: CPT

## 2020-09-09 PROCEDURE — 80048 BASIC METABOLIC PNL TOTAL CA: CPT

## 2020-09-09 PROCEDURE — 85025 COMPLETE CBC W/AUTO DIFF WBC: CPT

## 2020-09-09 PROCEDURE — 36415 COLL VENOUS BLD VENIPUNCTURE: CPT

## 2020-09-09 PROCEDURE — 85610 PROTHROMBIN TIME: CPT

## 2020-09-09 NOTE — TELEPHONE ENCOUNTER
Spoke with patient and reviewed Dr. Tosha Morelos instructions. He verbalized understanding. Pt reports he is scheduled for a heart cath on 9-. Will continue to monitor.      Noé Pardo RN, BSN  Natalie

## 2020-09-09 NOTE — TELEPHONE ENCOUNTER
----- Message from Yang Liang MD sent at 9/8/2020  4:05 PM EDT -----  Please ask him to increase Toprol XL to 75 mg bid. Thanks.  ----- Message -----  From: Dayami Amin RN  Sent: 9/8/2020   3:36 PM EDT  To: Yang Liang MD    Pt wearing lifevest now and seeing heart center tomorrow.  ALANA

## 2020-09-10 ENCOUNTER — HOSPITAL ENCOUNTER (OUTPATIENT)
Age: 70
Discharge: HOME | End: 2020-09-12
Payer: MEDICARE

## 2020-09-10 PROCEDURE — U0003 INFECTIOUS AGENT DETECTION BY NUCLEIC ACID (DNA OR RNA); SEVERE ACUTE RESPIRATORY SYNDROME CORONAVIRUS 2 (SARS-COV-2) (CORONAVIRUS DISEASE [COVID-19]), AMPLIFIED PROBE TECHNIQUE, MAKING USE OF HIGH THROUGHPUT TECHNOLOGIES AS DESCRIBED BY CMS-2020-01-R: HCPCS

## 2020-09-11 ENCOUNTER — NURSE ONLY (OUTPATIENT)
Dept: NON INVASIVE DIAGNOSTICS | Age: 70
End: 2020-09-11

## 2020-09-13 LAB
SARS-COV-2: NOT DETECTED
SOURCE: NORMAL

## 2020-09-15 ENCOUNTER — HOSPITAL ENCOUNTER (OUTPATIENT)
Dept: CARDIAC CATH/INVASIVE PROCEDURES | Age: 70
Discharge: HOME | End: 2020-09-15
Payer: MEDICARE

## 2020-09-15 VITALS
SYSTOLIC BLOOD PRESSURE: 146 MMHG | RESPIRATION RATE: 16 BRPM | BODY MASS INDEX: 31.52 KG/M2 | DIASTOLIC BLOOD PRESSURE: 76 MMHG | HEART RATE: 61 BPM | OXYGEN SATURATION: 95 % | TEMPERATURE: 97.6 F | WEIGHT: 226 LBS

## 2020-09-15 LAB
ABO/RH: NORMAL
ANTIBODY SCREEN: NORMAL
INR BLD: 1
POC ACT LR: 239 SECONDS
PROTHROMBIN TIME: 11.7 SEC (ref 9.3–12.4)

## 2020-09-15 PROCEDURE — 93458 L HRT ARTERY/VENTRICLE ANGIO: CPT | Performed by: INTERNAL MEDICINE

## 2020-09-15 PROCEDURE — 86850 RBC ANTIBODY SCREEN: CPT

## 2020-09-15 PROCEDURE — C1887 CATHETER, GUIDING: HCPCS

## 2020-09-15 PROCEDURE — 86901 BLOOD TYPING SEROLOGIC RH(D): CPT

## 2020-09-15 PROCEDURE — C1769 GUIDE WIRE: HCPCS

## 2020-09-15 PROCEDURE — 6360000002 HC RX W HCPCS

## 2020-09-15 PROCEDURE — 6370000000 HC RX 637 (ALT 250 FOR IP)

## 2020-09-15 PROCEDURE — 85347 COAGULATION TIME ACTIVATED: CPT

## 2020-09-15 PROCEDURE — 2709999900 HC NON-CHARGEABLE SUPPLY

## 2020-09-15 PROCEDURE — 36415 COLL VENOUS BLD VENIPUNCTURE: CPT

## 2020-09-15 PROCEDURE — C1760 CLOSURE DEV, VASC: HCPCS

## 2020-09-15 PROCEDURE — 2500000003 HC RX 250 WO HCPCS

## 2020-09-15 PROCEDURE — 85610 PROTHROMBIN TIME: CPT

## 2020-09-15 PROCEDURE — 86900 BLOOD TYPING SEROLOGIC ABO: CPT

## 2020-09-15 PROCEDURE — C1894 INTRO/SHEATH, NON-LASER: HCPCS

## 2020-09-15 RX ORDER — SODIUM CHLORIDE 9 MG/ML
INJECTION, SOLUTION INTRAVENOUS ONCE
Status: DISCONTINUED | OUTPATIENT
Start: 2020-09-15 | End: 2020-09-16 | Stop reason: HOSPADM

## 2020-09-15 RX ORDER — SODIUM CHLORIDE 9 MG/ML
INJECTION, SOLUTION INTRAVENOUS CONTINUOUS
Status: ACTIVE | OUTPATIENT
Start: 2020-09-15 | End: 2020-09-15

## 2020-09-15 RX ORDER — ACETAMINOPHEN 325 MG/1
650 TABLET ORAL EVERY 4 HOURS PRN
Status: DISCONTINUED | OUTPATIENT
Start: 2020-09-15 | End: 2020-09-16 | Stop reason: HOSPADM

## 2020-09-15 NOTE — PROGRESS NOTES
Food tray arrived and set up for patient. No bleeding or hematoma from the right groin and right wrist area.

## 2020-09-15 NOTE — PROCEDURES
Procedure:  Left heart cath    Physician: Crissy Narayan. Autumn ANDERSON. Assistant: none    Indication: NSVT  AUC: 8  AUC indication: 58    Complication: None. Sedation: Intravenous Versed. Anesthesia: Xylocaine, fentanyl     Sedation time: I was present for sedation and ministration [id number] I ended sedation at 1258for a total face-to-face sedation time of 45 min. Estimated blood loss: Minimal    Specimens: none    Contrast used: 60cc    Hemodynamics:  Opening Aortic pressure: 332/64  LV systolic pressure: 175  LVEDP: 20  No significant gradient across the aortic valve    Angiographic Results/findings:  Left Main: Long. Distal diffuse 30% stenosis. LAD: Moderately calcified. Tortuous. Mid eccentric diffuse 40 to 50% stenosis. D1: Mid bifurcating diffuse 80% stenosis. This is less than a 2 mm vessel at this location. Cx: Severely tortuous. Proximal diffuse 20 to 30%. Mid diffuse 30 to 40%. Diffuse mild luminal irregularities. OM1: Tiny vessel. OM2: No angiographically significant stenosis. RCA: Dominant. Severely tortuous. Distal mild diffuse luminal irregularities. PDA: Small vessel. PLB: No angiographically significant stenosis. Procedure:   After obtaining informed consent the patient was taken to the cardiac Cath Lab where the area over the right radial artery was prepped and draped in a sterile fashion. Using ultrasound guidance and a micropuncture technique a 6 [place]ian slender glide sheath was placed in the right radial artery. This was aspirated & flushed several times throughout the procedure. This was medicated with verapamil and nitroglycerin. They were given heparin systemically. A 5 Western Tosin TIG catheter was advanced over a wire to the root of the aorta. It was aspirated & flushed with saline. Pressures were obtained. It was filled with contrast.  The right subclavian was extremely tortuous making it nearly impossible to manipulate the catheter.   I was able to manipulate into

## 2020-09-15 NOTE — PROGRESS NOTES
Tolerated ambulation, radial and groin sites WNL, IVs removed. Discharge instructions given to patient and wife. Discharged via wheel chair to home at 1625.

## 2020-09-16 ENCOUNTER — TELEPHONE (OUTPATIENT)
Dept: NON INVASIVE DIAGNOSTICS | Age: 70
End: 2020-09-16

## 2020-09-16 NOTE — TELEPHONE ENCOUNTER
I spoke to Kerry Del Rio and schedule his device upgrade to 9/23/20 at 7:30 w/ CK. He will obtain a COVID test on 9/17/20 at [facility].

## 2020-09-16 NOTE — TELEPHONE ENCOUNTER
----- Message from Jason Ruffin MD sent at 9/16/2020 10:02 AM EDT -----  Please increase the patient's Toprol 100mg BID and scheduled for ICD upgrade. Thanks.

## 2020-09-17 ENCOUNTER — HOSPITAL ENCOUNTER (OUTPATIENT)
Age: 70
Discharge: HOME | End: 2020-09-19
Payer: MEDICARE

## 2020-09-17 PROCEDURE — U0003 INFECTIOUS AGENT DETECTION BY NUCLEIC ACID (DNA OR RNA); SEVERE ACUTE RESPIRATORY SYNDROME CORONAVIRUS 2 (SARS-COV-2) (CORONAVIRUS DISEASE [COVID-19]), AMPLIFIED PROBE TECHNIQUE, MAKING USE OF HIGH THROUGHPUT TECHNOLOGIES AS DESCRIBED BY CMS-2020-01-R: HCPCS

## 2020-09-19 LAB
SARS-COV-2: NOT DETECTED
SOURCE: NORMAL

## 2020-09-23 ENCOUNTER — HOSPITAL ENCOUNTER (OUTPATIENT)
Dept: GENERAL RADIOLOGY | Age: 70
Discharge: HOME | End: 2020-09-25
Payer: MEDICARE

## 2020-09-23 ENCOUNTER — ANESTHESIA (OUTPATIENT)
Dept: CARDIAC CATH/INVASIVE PROCEDURES | Age: 70
End: 2020-09-23

## 2020-09-23 ENCOUNTER — HOSPITAL ENCOUNTER (OUTPATIENT)
Dept: CARDIAC CATH/INVASIVE PROCEDURES | Age: 70
Setting detail: OBSERVATION
Discharge: HOME | End: 2020-09-24
Attending: INTERNAL MEDICINE | Admitting: INTERNAL MEDICINE
Payer: MEDICARE

## 2020-09-23 ENCOUNTER — ANESTHESIA EVENT (OUTPATIENT)
Dept: CARDIAC CATH/INVASIVE PROCEDURES | Age: 70
End: 2020-09-23

## 2020-09-23 VITALS
DIASTOLIC BLOOD PRESSURE: 96 MMHG | RESPIRATION RATE: 24 BRPM | SYSTOLIC BLOOD PRESSURE: 119 MMHG | OXYGEN SATURATION: 100 %

## 2020-09-23 LAB
ANION GAP SERPL CALCULATED.3IONS-SCNC: 11 MMOL/L (ref 7–16)
BASOPHILS ABSOLUTE: 0.07 E9/L (ref 0–0.2)
BASOPHILS RELATIVE PERCENT: 1 % (ref 0–2)
BUN BLDV-MCNC: 21 MG/DL (ref 8–23)
CALCIUM SERPL-MCNC: 9.7 MG/DL (ref 8.6–10.2)
CHLORIDE BLD-SCNC: 101 MMOL/L (ref 98–107)
CO2: 25 MMOL/L (ref 22–29)
CREAT SERPL-MCNC: 1 MG/DL (ref 0.7–1.2)
EKG ATRIAL RATE: 60 BPM
EKG P AXIS: 1 DEGREES
EKG P-R INTERVAL: 232 MS
EKG Q-T INTERVAL: 440 MS
EKG QRS DURATION: 168 MS
EKG QTC CALCULATION (BAZETT): 440 MS
EKG R AXIS: -59 DEGREES
EKG T AXIS: -61 DEGREES
EKG VENTRICULAR RATE: 60 BPM
EOSINOPHILS ABSOLUTE: 0.09 E9/L (ref 0.05–0.5)
EOSINOPHILS RELATIVE PERCENT: 1.3 % (ref 0–6)
GFR AFRICAN AMERICAN: >60
GFR NON-AFRICAN AMERICAN: >60 ML/MIN/1.73
GLUCOSE BLD-MCNC: 107 MG/DL (ref 74–99)
HCT VFR BLD CALC: 37.6 % (ref 37–54)
HEMOGLOBIN: 13.9 G/DL (ref 12.5–16.5)
IMMATURE GRANULOCYTES #: 0.02 E9/L
IMMATURE GRANULOCYTES %: 0.3 % (ref 0–5)
LYMPHOCYTES ABSOLUTE: 1.88 E9/L (ref 1.5–4)
LYMPHOCYTES RELATIVE PERCENT: 26.6 % (ref 20–42)
MAGNESIUM: 2 MG/DL (ref 1.6–2.6)
MCH RBC QN AUTO: 29.5 PG (ref 26–35)
MCHC RBC AUTO-ENTMCNC: 37 % (ref 32–34.5)
MCV RBC AUTO: 79.8 FL (ref 80–99.9)
MONOCYTES ABSOLUTE: 0.54 E9/L (ref 0.1–0.95)
MONOCYTES RELATIVE PERCENT: 7.6 % (ref 2–12)
NEUTROPHILS ABSOLUTE: 4.46 E9/L (ref 1.8–7.3)
NEUTROPHILS RELATIVE PERCENT: 63.2 % (ref 43–80)
PDW BLD-RTO: 13.5 FL (ref 11.5–15)
PLATELET # BLD: 179 E9/L (ref 130–450)
PMV BLD AUTO: 12 FL (ref 7–12)
POTASSIUM SERPL-SCNC: 4.4 MMOL/L (ref 3.5–5)
RBC # BLD: 4.71 E12/L (ref 3.8–5.8)
SODIUM BLD-SCNC: 137 MMOL/L (ref 132–146)
WBC # BLD: 7.1 E9/L (ref 4.5–11.5)

## 2020-09-23 PROCEDURE — 2709999900 HC NON-CHARGEABLE SUPPLY

## 2020-09-23 PROCEDURE — 2580000003 HC RX 258

## 2020-09-23 PROCEDURE — 2580000003 HC RX 258: Performed by: NURSE ANESTHETIST, CERTIFIED REGISTERED

## 2020-09-23 PROCEDURE — G0378 HOSPITAL OBSERVATION PER HR: HCPCS

## 2020-09-23 PROCEDURE — 33249 INSJ/RPLCMT DEFIB W/LEAD(S): CPT | Performed by: INTERNAL MEDICINE

## 2020-09-23 PROCEDURE — 2780000010 HC IMPLANT OTHER

## 2020-09-23 PROCEDURE — 2720000010 HC SURG SUPPLY STERILE

## 2020-09-23 PROCEDURE — 71045 X-RAY EXAM CHEST 1 VIEW: CPT

## 2020-09-23 PROCEDURE — 85025 COMPLETE CBC W/AUTO DIFF WBC: CPT

## 2020-09-23 PROCEDURE — 6370000000 HC RX 637 (ALT 250 FOR IP): Performed by: INTERNAL MEDICINE

## 2020-09-23 PROCEDURE — 33233 REMOVAL OF PM GENERATOR: CPT | Performed by: INTERNAL MEDICINE

## 2020-09-23 PROCEDURE — 93005 ELECTROCARDIOGRAM TRACING: CPT | Performed by: INTERNAL MEDICINE

## 2020-09-23 PROCEDURE — 3700000000 HC ANESTHESIA ATTENDED CARE

## 2020-09-23 PROCEDURE — 6360000002 HC RX W HCPCS

## 2020-09-23 PROCEDURE — C1894 INTRO/SHEATH, NON-LASER: HCPCS

## 2020-09-23 PROCEDURE — G0379 DIRECT REFER HOSPITAL OBSERV: HCPCS

## 2020-09-23 PROCEDURE — 83735 ASSAY OF MAGNESIUM: CPT

## 2020-09-23 PROCEDURE — 6360000002 HC RX W HCPCS: Performed by: NURSE ANESTHETIST, CERTIFIED REGISTERED

## 2020-09-23 PROCEDURE — C1777 LEAD, AICD, ENDO SINGLE COIL: HCPCS

## 2020-09-23 PROCEDURE — 6360000002 HC RX W HCPCS: Performed by: INTERNAL MEDICINE

## 2020-09-23 PROCEDURE — 2500000003 HC RX 250 WO HCPCS

## 2020-09-23 PROCEDURE — C1721 AICD, DUAL CHAMBER: HCPCS

## 2020-09-23 PROCEDURE — 80048 BASIC METABOLIC PNL TOTAL CA: CPT

## 2020-09-23 PROCEDURE — 3700000001 HC ADD 15 MINUTES (ANESTHESIA)

## 2020-09-23 PROCEDURE — 36415 COLL VENOUS BLD VENIPUNCTURE: CPT

## 2020-09-23 PROCEDURE — 2580000003 HC RX 258: Performed by: INTERNAL MEDICINE

## 2020-09-23 RX ORDER — ACETAMINOPHEN 325 MG/1
650 TABLET ORAL ONCE
Status: COMPLETED | OUTPATIENT
Start: 2020-09-23 | End: 2020-09-23

## 2020-09-23 RX ORDER — ISOSORBIDE MONONITRATE 60 MG/1
120 TABLET, EXTENDED RELEASE ORAL DAILY
Status: DISCONTINUED | OUTPATIENT
Start: 2020-09-24 | End: 2020-09-24 | Stop reason: HOSPADM

## 2020-09-23 RX ORDER — ASPIRIN 81 MG/1
81 TABLET, CHEWABLE ORAL DAILY
Status: DISCONTINUED | OUTPATIENT
Start: 2020-09-24 | End: 2020-09-24 | Stop reason: HOSPADM

## 2020-09-23 RX ORDER — FENTANYL CITRATE 50 UG/ML
INJECTION, SOLUTION INTRAMUSCULAR; INTRAVENOUS PRN
Status: DISCONTINUED | OUTPATIENT
Start: 2020-09-23 | End: 2020-09-23 | Stop reason: SDUPTHER

## 2020-09-23 RX ORDER — TAMSULOSIN HYDROCHLORIDE 0.4 MG/1
0.4 CAPSULE ORAL DAILY
Status: DISCONTINUED | OUTPATIENT
Start: 2020-09-24 | End: 2020-09-24 | Stop reason: HOSPADM

## 2020-09-23 RX ORDER — SODIUM CHLORIDE 0.9 % (FLUSH) 0.9 %
10 SYRINGE (ML) INJECTION EVERY 12 HOURS SCHEDULED
Status: DISCONTINUED | OUTPATIENT
Start: 2020-09-23 | End: 2020-09-24 | Stop reason: HOSPADM

## 2020-09-23 RX ORDER — FLUTICASONE PROPIONATE 50 MCG
1 SPRAY, SUSPENSION (ML) NASAL DAILY
Status: DISCONTINUED | OUTPATIENT
Start: 2020-09-24 | End: 2020-09-24 | Stop reason: HOSPADM

## 2020-09-23 RX ORDER — SERTRALINE HYDROCHLORIDE 100 MG/1
100 TABLET, FILM COATED ORAL DAILY
Status: DISCONTINUED | OUTPATIENT
Start: 2020-09-24 | End: 2020-09-24 | Stop reason: HOSPADM

## 2020-09-23 RX ORDER — CETIRIZINE HYDROCHLORIDE 10 MG/1
10 TABLET ORAL DAILY
Status: DISCONTINUED | OUTPATIENT
Start: 2020-09-23 | End: 2020-09-24 | Stop reason: HOSPADM

## 2020-09-23 RX ORDER — SODIUM CHLORIDE 9 MG/ML
INJECTION, SOLUTION INTRAVENOUS CONTINUOUS PRN
Status: DISCONTINUED | OUTPATIENT
Start: 2020-09-23 | End: 2020-09-23 | Stop reason: SDUPTHER

## 2020-09-23 RX ORDER — SODIUM CHLORIDE 0.9 % (FLUSH) 0.9 %
10 SYRINGE (ML) INJECTION PRN
Status: DISCONTINUED | OUTPATIENT
Start: 2020-09-23 | End: 2020-09-24 | Stop reason: HOSPADM

## 2020-09-23 RX ORDER — PROPOFOL 10 MG/ML
INJECTION, EMULSION INTRAVENOUS CONTINUOUS PRN
Status: DISCONTINUED | OUTPATIENT
Start: 2020-09-23 | End: 2020-09-23 | Stop reason: SDUPTHER

## 2020-09-23 RX ORDER — LISINOPRIL 20 MG/1
40 TABLET ORAL DAILY
Status: DISCONTINUED | OUTPATIENT
Start: 2020-09-24 | End: 2020-09-24 | Stop reason: HOSPADM

## 2020-09-23 RX ORDER — CEFAZOLIN SODIUM 1 G/3ML
INJECTION, POWDER, FOR SOLUTION INTRAMUSCULAR; INTRAVENOUS PRN
Status: DISCONTINUED | OUTPATIENT
Start: 2020-09-23 | End: 2020-09-23 | Stop reason: SDUPTHER

## 2020-09-23 RX ORDER — HYDROCODONE BITARTRATE AND ACETAMINOPHEN 7.5; 325 MG/1; MG/1
1 TABLET ORAL EVERY 6 HOURS PRN
Status: DISCONTINUED | OUTPATIENT
Start: 2020-09-23 | End: 2020-09-24 | Stop reason: HOSPADM

## 2020-09-23 RX ORDER — METOPROLOL TARTRATE 50 MG/1
100 TABLET, FILM COATED ORAL 2 TIMES DAILY
Status: DISCONTINUED | OUTPATIENT
Start: 2020-09-23 | End: 2020-09-24 | Stop reason: HOSPADM

## 2020-09-23 RX ORDER — MIDAZOLAM HYDROCHLORIDE 1 MG/ML
INJECTION INTRAMUSCULAR; INTRAVENOUS PRN
Status: DISCONTINUED | OUTPATIENT
Start: 2020-09-23 | End: 2020-09-23 | Stop reason: SDUPTHER

## 2020-09-23 RX ORDER — ONDANSETRON 2 MG/ML
4 INJECTION INTRAMUSCULAR; INTRAVENOUS EVERY 6 HOURS PRN
Status: DISCONTINUED | OUTPATIENT
Start: 2020-09-23 | End: 2020-09-24 | Stop reason: HOSPADM

## 2020-09-23 RX ORDER — GABAPENTIN 300 MG/1
300 CAPSULE ORAL 3 TIMES DAILY PRN
Status: DISCONTINUED | OUTPATIENT
Start: 2020-09-23 | End: 2020-09-24 | Stop reason: HOSPADM

## 2020-09-23 RX ADMIN — SODIUM CHLORIDE: 9 INJECTION, SOLUTION INTRAVENOUS at 07:45

## 2020-09-23 RX ADMIN — FENTANYL CITRATE 50 MCG: 50 INJECTION, SOLUTION INTRAMUSCULAR; INTRAVENOUS at 07:51

## 2020-09-23 RX ADMIN — HYDROCODONE BITARTRATE AND ACETAMINOPHEN 1 TABLET: 7.5; 325 TABLET ORAL at 16:16

## 2020-09-23 RX ADMIN — Medication 2 G: at 16:17

## 2020-09-23 RX ADMIN — Medication 10 ML: at 11:08

## 2020-09-23 RX ADMIN — Medication 10 ML: at 20:23

## 2020-09-23 RX ADMIN — CEFAZOLIN 3000 MG: 1 INJECTION, POWDER, FOR SOLUTION INTRAMUSCULAR; INTRAVENOUS at 08:08

## 2020-09-23 RX ADMIN — ACETAMINOPHEN 650 MG: 325 TABLET, FILM COATED ORAL at 10:27

## 2020-09-23 RX ADMIN — HYDROCODONE BITARTRATE AND ACETAMINOPHEN 1 TABLET: 7.5; 325 TABLET ORAL at 21:00

## 2020-09-23 RX ADMIN — METOPROLOL TARTRATE 100 MG: 50 TABLET, FILM COATED ORAL at 20:23

## 2020-09-23 RX ADMIN — MIDAZOLAM 2 MG: 1 INJECTION INTRAMUSCULAR; INTRAVENOUS at 07:51

## 2020-09-23 RX ADMIN — PROPOFOL 25 MCG/KG/MIN: 10 INJECTION, EMULSION INTRAVENOUS at 08:09

## 2020-09-23 RX ADMIN — FENTANYL CITRATE 50 MCG: 50 INJECTION, SOLUTION INTRAMUSCULAR; INTRAVENOUS at 08:00

## 2020-09-23 ASSESSMENT — PULMONARY FUNCTION TESTS
PIF_VALUE: 13
PIF_VALUE: 14
PIF_VALUE: 16
PIF_VALUE: 12
PIF_VALUE: 13
PIF_VALUE: 12
PIF_VALUE: 12
PIF_VALUE: 13
PIF_VALUE: 14
PIF_VALUE: 12
PIF_VALUE: 13
PIF_VALUE: 12
PIF_VALUE: 13
PIF_VALUE: 14
PIF_VALUE: 13
PIF_VALUE: 14
PIF_VALUE: 12
PIF_VALUE: 20
PIF_VALUE: 13
PIF_VALUE: 13
PIF_VALUE: 12
PIF_VALUE: 13
PIF_VALUE: 14
PIF_VALUE: 12
PIF_VALUE: 12
PIF_VALUE: 13
PIF_VALUE: 13
PIF_VALUE: 14
PIF_VALUE: 13
PIF_VALUE: 13
PIF_VALUE: 12
PIF_VALUE: 13
PIF_VALUE: 12
PIF_VALUE: 13
PIF_VALUE: 13
PIF_VALUE: 14
PIF_VALUE: 13
PIF_VALUE: 16
PIF_VALUE: 12
PIF_VALUE: 13
PIF_VALUE: 13
PIF_VALUE: 12
PIF_VALUE: 13
PIF_VALUE: 12
PIF_VALUE: 12
PIF_VALUE: 13
PIF_VALUE: 13
PIF_VALUE: 16
PIF_VALUE: 12
PIF_VALUE: 13
PIF_VALUE: 20
PIF_VALUE: 13
PIF_VALUE: 12
PIF_VALUE: 13
PIF_VALUE: 15
PIF_VALUE: 13
PIF_VALUE: 12
PIF_VALUE: 13
PIF_VALUE: 12
PIF_VALUE: 13
PIF_VALUE: 12
PIF_VALUE: 15
PIF_VALUE: 13
PIF_VALUE: 13

## 2020-09-23 ASSESSMENT — PAIN DESCRIPTION - FREQUENCY: FREQUENCY: INTERMITTENT

## 2020-09-23 ASSESSMENT — LIFESTYLE VARIABLES: SMOKING_STATUS: 0

## 2020-09-23 ASSESSMENT — PAIN SCALES - GENERAL
PAINLEVEL_OUTOF10: 3
PAINLEVEL_OUTOF10: 7
PAINLEVEL_OUTOF10: 5
PAINLEVEL_OUTOF10: 7
PAINLEVEL_OUTOF10: 6
PAINLEVEL_OUTOF10: 4
PAINLEVEL_OUTOF10: 5

## 2020-09-23 ASSESSMENT — PAIN DESCRIPTION - DESCRIPTORS: DESCRIPTORS: ACHING;DISCOMFORT;TENDER

## 2020-09-23 ASSESSMENT — PAIN DESCRIPTION - LOCATION: LOCATION: CHEST

## 2020-09-23 ASSESSMENT — PAIN DESCRIPTION - ORIENTATION: ORIENTATION: LEFT

## 2020-09-23 ASSESSMENT — PAIN DESCRIPTION - PAIN TYPE: TYPE: SURGICAL PAIN

## 2020-09-23 NOTE — ANESTHESIA PRE PROCEDURE
mm vessel at this location. Cx: Severely tortuous. Proximal diffuse 20 to 30%. Mid diffuse   30 to 40%. Diffuse mild luminal irregularities. OM1: Tiny vessel. OM2: No angiographically significant stenosis. RCA: Dominant. Severely tortuous. Distal mild diffuse luminal   irregularities. PDA: Small vessel. PLB: No angiographically   Ordering Physician: Allie Tian MD   Date of Service: 08/08/20   Procedure(s): XR chest 1V portable   Accession Number(s): [id number]      cc:       HISTORY: Dizziness, weakness. Comparison:  March 5, 2020. PHYSICIAN INDICATIONS:  syncope         TECHNIQUE:  XR chest 1V portable. FINDINGS:  Suboptimal inspiratory result reduces the sensitivity of the study. The heart size is    enlarged but visually stable from prior. Some aortic ectasia unchanged from prior. .  The lung    fields are clear. No infiltrates or effusions are identified. Left-sided pacemaker stable from    prior. IMPRESSION XR chest 1V portable:    No acute cardiopulmonary disease. Cardiomegaly and aortic ectasia similar appearance to prior. HISTORY: Pain, injury, trauma, fall while drinking         PHYSICIAN INDICATIONS:  syncope, fall         TECHNIQUE:  Axial images were obtained through the head without intravenous contrast.  CT Dose    Index: 48.2 mGy. DLP: 905 mGy-cm. Alfrieda Eng.  k=0.0021mSv/(mGy*cm)         FINDINGS: Right posterior parietal approach shunt with tip in the right ventricle unchanged from    prior. Ventricles are unchanged in size from prior. No hydrocephalus at this time. There is no    evidence of acute intracranial hemorrhage, mass effect, midline shift, intra or extra axial fluid    collection present. The ventricles, sulci, and cisterns are prominent in appearance. No depressed    skull fracture seen. Bianca Gull white differentiation is maintained. No acute infarct identified.  The    white matter shows some decrease in attenuation. The visualized sinuses appear clear. IMPRESSION CT head without contrast:    1. No evidence of acute intracranial hemorrhage or calvarial fracture. 2.  Mild atrophy and small vessel ischemic changes of the brain. 3.   shunt in place, ventricle is unchanged in appearance from prior. Anesthesia Plan      MAC     ASA 4       Induction: intravenous. Anesthetic plan and risks discussed with patient. Use of blood products discussed with patient whom. Plan discussed with attending.                   Angelita Boland, APRN - CRNA   9/23/2020

## 2020-09-23 NOTE — OP NOTE
[facility] Electrophysiology  Procedure Report  PATIENT: Kaylin Linares  MEDICAL RECORD NUMBER: [id number]  DATE OF PROCEDURE:  9/23/2020  ATTENDING ELECTROPHYSIOLOGIST:Susana Banda MD  REFERRING PHYSICIAN: Dr. Ramiro Smith    Procedure Performed:  1. Insertion of dual chamber implantable cardioverter defibrillator pulse generator ([employer])  2. Insertion of right ventricular defibrillator lead. 3. Removal of dual chamber pacemaker pulse generator  4. Left upper extremity venography  5. Fluoroscopy    Indication for Procedure:  1. Sustained ventricular tachycardia  2. Dual chamber permanent pacemaker in situ due to high grade AV block     Flouroscopy: 22.5 min  Complications: none immediately apparent  EBL: minimal  Specimens: none  Contrast: 10 cc    FINDINGS:  Implanted device information:  1. New ICD Pulse Generator is a [employer]. Serial # [id number]  Placement: Left pectoral subcutaneous  Date of implant: 9/23/2020  2. Old Pacemaker Pulse Generator is a [employer]. Serial # [id number]  Placement: Left pectoral subcutaneous  Date of explant: 9/23/2020  Date of implant: 3/6/2014  3. Right atrial lead parameters are as follows:  [address] [facility] # [id number]. Serial # [id number]  Lead position: RA  Date of implant: 3/6/2014  P-waves: 4.0 mV  Pacing threshold: 0.7 V at 0.4 ms. Impedance: 363 ohms. 4. New right ventricular ICD lead parameters are as follows:  [address] [place] [address] 1192. Serial # [id number]  Lead position: RV  Date of implant: 9/23/2020  R-waves: 8.9 mV  Pacing threshold: 0.8 V at 0.4 ms. Impedance: 573 (84) ohms. 5. Old right ventricular pacemaker lead parameters are as follows:  [address] 5230. Serial # [id number]  Lead position: RV  Date of implant: 3/6/2014  Date of abandonment: 9/23/2020   6. Bradycardia parameters:  Mode: DDDR  Base Rate: 60  AV delay: 220-350  Max Tracking Rate: 110  7.  Tachycardia zone configuration:  The patient is programmed with 2 tachycardia zones  VT: 140 beats/min  SVT discriminators are turned on. ATP   41 J x 6  VF: 180 beats/min  41 J x 8    DETAILS OF THE OPERATION:The risks, benefits, alternatives of the procedure were explained to patient and family. They consented and agreed to proceed. Written consent was obtained in the chart. Blood products are not routinely needed for such procedures. The patient was brought to the Electrophysiology lab in a fasting and non-sedated state. The patient had electrocardiographic and hemodynamic monitoring equipment placed. During the case the patient was under the care of an anesthesiologist/CRNA team for sedation and hemodynamic monitoring. A final time out was performed prior to beginning the procedure. The left subclavian venogram was performed and showed patent subclavian vein. The patient was prepped and draped in usual sterile fashion. Twenty mL of 2% lidocaine was used to anesthetize the left pectoral area. Using a #10 blade an incision was made over the patient's previous incision. Using combination of scalpel and plasma-blade, we dissected down to the device pocket. The device and was then removed from the pocket and the leads were disconnected from the header of the device. The leads were visually inspected and lead parameters were checked and deemed to adequate. The patient's previous device pocket was then extended inferiorly and medially to approximate the size of the new ICD device. Hemostasis was achieved with electrocautery and confirmed visually. Using a modified Seldinger technique and a fluroscopic guidance, guide wire was placed in the left subclavian vein. Over the short J-tipped guide wire, a 8-Fr Safe-sheath was passed. Through this, the right ventricular lead was advanced without difficulty to the right ventricular septum using a combination of straight and curved stylets and under fluoroscopic guidance.  Mapping of the overnight on Telemetry. 5. The patient will receive 24-hours of wade-operative intravenous antibiotics. 6. The patient's device will be interrogated in the morning by a representative of the device . 7. The patient is to follow-up in device clinic within 2 weeks upon discharge. 8. The patient is advised follow all post-operative device and wound care instructions as per the information provided in the pre-operative clinic.     Apryl Flores MD  Cardiac Electrophysiology  [address]  [facility] [place]: [facility]  7:40 AM  9/23/2020

## 2020-09-23 NOTE — H&P
[facility] and [facility] Electrophysiology  Consultation Report  PATIENT: Lorenzo Bliss RECORD NUMBER: [id number]  DATE OF SERVICE:  9/23/2020  ATTENDING ELECTROPHYSIOLOGIST: Jas Hector MD  REFERRING PHYSICIAN: Ramya Johnston MD and Rocio Mejia MD  CHIEF COMPLAINT: Sustained ventricular tachycardia    HPI: This is a 69 y.o. male with a history of   Patient Active Problem List   Diagnosis    PTSD (post-traumatic stress disorder)    Depression    Pacemaker    HTN (hypertension)    Spinal stenosis at L4-L5 level    Depression, major, recurrent, moderate ([address]   who presents to cardiac electrophysiology clinic for consultation of sustained ventricular tachycardia. The patient has history of high grade AV block status post pacemaker implantation in March of 2014 , hypertension, hydrocephalus and RBBB. The Patient was noted to have sustained ventricular tachycardia up to 5 minutes during pacemaker interrogation since May 2020. Last episode of sustained VT was 8/17/20. Echocardiogram 4/28/20 showed LV EF of 55-60%. Nuclear stress test on 4/28/20 showed no cardiac ischemia. The patient is currently taking Cardizem and reports that he might taking Metoprolol at home but he was not sure. No Metoprolol on his current list of medications. He reports syncopal episode on 8/8/20 when he presented to [facility] and left AMA. He states that he had 2 beers in morning and went to his son's house. While he was standing to get out of the car and he slid his legs out and went down. Doesn't remember anything after until in the EMS came. He is unaware of how long he was out for. Mr. Cris Koyanagi goes on to report SOBOE and chest discomfort with ambulation. The patient denies any orthopnea or paroxysmal nocturnal dyspnea. Discussed regarding CAG, WCD and possible ICD upgrade as a line of investigation and treatment.  Advised ED visit and evaluation now due to recurrent VT which he defers. He is aware of risk of SCD. Patient Active Problem List    Diagnosis Date Noted    Depression, major, recurrent, moderate ([facility] 2020    Spinal stenosis at L4-L5 level 2015    PTSD (post-traumatic stress disorder) 2014    Depression 2014    Pacemaker 2014    HTN (hypertension) 2014       Past Medical History:   Diagnosis Date    Cerebral artery occlusion with cerebral infarction ([facility]     Chronic back pain     Depression     Hyperlipidemia     Hypertension     Pacemaker     Sleep apnea     TIA (transient ischemic attack)     Ventricular tachycardia ([address]        Family History   Problem Relation Age of Onset    High Blood Pressure Father     Diabetes Sister     Heart Disease Brother     Diabetes Sister     Diabetes Sister        Social History     Tobacco Use    Smoking status: Former Smoker     Packs/day: 1.00     Years: 1.00     Pack years: 1.00     Types: Cigarettes     Last attempt to quit:      Years since quittin.7    Smokeless tobacco: Never Used    Tobacco comment: quit 30 years   Substance Use Topics    Alcohol use: Yes     Comment: rare       Current Outpatient Medications   Medication Sig Dispense Refill    metoprolol tartrate (LOPRESSOR) 50 MG tablet Take 50 mg by mouth 2 times daily      HYDROcodone-acetaminophen (NORCO) 7.5-325 MG per tablet TAKE ONE TABLET BY MOUTH FOUR TIMES DAILY AS NEEDED FOR PAIN      busPIRone (BUSPAR) 10 MG tablet Take 15 mg by mouth       gabapentin (NEURONTIN) 300 MG capsule Take 1 capsule by mouth 3 times daily for 90 days. (Patient taking differently: Take 300 mg by mouth 3 times daily as needed. ) 270 capsule 0    tamsulosin (FLOMAX) 0.4 MG capsule Take 1 capsule by mouth daily Night time urination help.  30 capsule 2    cetirizine (ZYRTEC) 10 MG tablet Take 1 tablet by mouth daily 30 tablet 1    fluticasone (FLONASE ALLERGY RELIEF) 50 MCG/ACT nasal spray 1 spray by Each Nostril route daily 1 Bottle 1    isosorbide mononitrate (IMDUR) 60 MG extended release tablet TAKE TWO TABLETS BY MOUTH EVERY DAY      sertraline (ZOLOFT) 100 MG tablet Take 1 tablet by mouth daily For mood. 30 tablet 5    lisinopril (PRINIVIL;ZESTRIL) 40 MG tablet Take 1 tablet by mouth daily For blood pressure (Patient taking differently: Take 20 mg by mouth daily For blood pressure.) 90 tablet 1    aspirin (ASPIRIN 81) 81 MG chewable tablet Take by mouth daily        No current facility-administered medications for this encounter. Allergies   Allergen Reactions    Bee Venom     Naproxen     Meloxicam Nausea And Vomiting       ROS:   Constitutional: Negative for fever, activity change and appetite change. HENT: Negative for epistaxis. Eyes: Negative for diploplia, blurred vision. Respiratory: Negative for cough. Positive for chest tightness and hortness of breath. Negative of wheezing. Cardiovascular: pertinent positives in HPI  Gastrointestinal: Negative for abdominal pain and blood in stool. All other review of systems are negative     PHYSICAL EXAM:  There were no vitals filed for this visit. Constitutional: Well-developed, no acute distress  Eyes: conjunctivae normal, no xanthelasma   Ears, Nose, Throat: oral mucosa moist, no cyanosis   CV: no JVD. Regular rate and rhythm. Normal S1S2 and no S3. No murmurs, rubs, or gallops. PMI is nondisplaced  Lungs: clear to auscultation bilaterally, normal respiratory effort without used of accessory muscles  Abdomen: soft, non-tender, bowel sounds present, no masses or hepatomegaly   Musculoskeletal: no digital clubbing, no edema   Skin: warm, no rashes   Pacemaker site: well healed. No erosion, infection or migration. I have personally reviewed the laboratory, cardiac diagnostic and radiographic testing as outlined below:    Data:    No results for input(s): WBC, HGB, HCT, PLT in the last 72 hours.   No results for input(s): NA, K, CL, CO2, BUN, CREATININE, CALCIUM in the last 72 hours. Invalid input(s): GLU, MAGNESIUM   Lab Results   Component Value Date    MG 1.5 2020     No results for input(s): TSH in the last 72 hours. No results for input(s): INR in the last 72 hours. CXR 20:   FINDINGS:  Suboptimal inspiratory result reduces the sensitivity of the study.  The heart size is       enlarged but visually stable from prior.  Some aortic ectasia unchanged from prior. Doni Romberg lung       fields are clear. No infiltrates or effusions are identified.    Left-sided pacemaker stable from       prior.               IMPRESSION XR chest 1V portable:       No acute cardiopulmonary disease.  Cardiomegaly and aortic ectasia similar appearance to prior.          EK/3/20: A paced with RBBB rate: 70 bpm, PVC, QTc 447 msec- Please see scan in Cardiology. Echocardiogram 20:     Stress Test 20:     Device Interrogation:   Underlying rhythm: AsVs  Mode: DDDR   Battery Voltage/Longevity:  2.5 years    Pacing: A: 58%  RV: 10%    P wave: 2.7 mV  Impedance: 375 ohms   Threshold: 0.6 V @ 0.5 ms  RV R wave: 22.4 mV  Impedance: 440 ohms   Threshold: 0.5 V @ 0.4 ms  Episodes: AF burden: <1%  - 885 nonsustained ventricular events     - 42 SVT episodes    - 334 VT episodes  Reprogramming included: see below  Overall device function is normal    All device programmable settings were evaluated per above and in the scanned document, along with iterative adjustments (capture thresholds) to assess and select the most appropriate final programming to provide for consistent delivery of the appropriate therapy and to verify function of the device. I have independently reviewed all of the ECGs and rhythm strips per above     Assessment/Plan:  This is a 69 y.o. male with a history of   Patient Active Problem List   Diagnosis    PTSD (post-traumatic stress disorder)    Depression    Pacemaker    HTN (hypertension)    Spinal stenosis at L4-L5 level    Depression, major, recurrent, moderate ([address]    who presents with sustained ventricular tachycardia. 1. Sustained ventricular tachycardia  - Documented up to 4 minutes on pacemaker interrogation.  - Echocardiogram 4/28/20 showed LV EF of 55-60%. - Nuclear stress test 4/28/20 showed no ischemia.  - Consider start Beta-blocker if no contraindication.  - Will schedule coronary angiogram to exclude significant CAD. Discussed with Dr. Fuad Wood.  - Consider ICD upgrade if CAG shows no significant CAD. - Will prescribe LifeVest in the mean time. 2. Pacemaker in situ  - Implanted in March if 2014  - Indication high grade AV block. - Normal device function. 3. High grade AV block   - Status post pacemaker implantation in March of 2014     4. Hypertension  - Well controlled. - On Cardizem, Zestril and Imdur. 5. Hydrocephalus    6. RBBB  - Chronic. 7. Syncope  - Can not exclude from arrhythmic cause. Recommendations:  1. Coronary angiogram to exclude significant coronary artery disease. Discussed with Dr. Fuad Wood who will arrange the procedure. 2. Patient to call the office with his current medication list. Would like to discontinue Cardizem and start Toprol XL if no definite contraindication. 3. Will prescribe WCD LifeVest in the mean time while await for CAG. LifeVest ordered. 4. Consider ICD upgrade if CAG shows no significant CAD. 5. Follow up after the procedure or in 3 months. Encouraged the patient to call the office for any questions or concerns. I have spent a total of 60 minutes with the patient and the family reviewing the above stated recommendations. And a total of >50% of that time involved face-to-face time providing counseling and or coordination of care with the other providers. Thank you for allowing me to participate in your patient's care. Please call me if there are any questions or concerns.       Genaro Dukes MD  Cardiac Electrophysiology  [facility]) Physicians  [facility] [place]: [facility]  7:16 AM  9/23/2020    Addendum:    Office note reviewed. No change has been made. The patient seen and examined. CAG 9/15/20 showed 80% diffuse stenosis of D1. Risks, benefits and alternatives of ICD upgrade were discussed. These risks include but are not limited to bleeding,infection, lead damage or discovery of a non-functional lead which would require lead revision and risks of blood clot, pneumothorax, hemothorax, cardiac perforation and tamponade, lead dislodgement, contrast induced nephropathy leading to short or even long term dialysis, vascular injury requiring emergent surgical repair, stroke and even death. The patient understands these risks and agrees to proceed today.     Pat Lovell MD  Cardiac Electrophysiology  [facility]  [facility] [place]: [facility]  7:18 AM  9/23/2020

## 2020-09-24 ENCOUNTER — APPOINTMENT (OUTPATIENT)
Dept: GENERAL RADIOLOGY | Age: 70
End: 2020-09-24
Attending: INTERNAL MEDICINE
Payer: MEDICARE

## 2020-09-24 VITALS
TEMPERATURE: 96.8 F | OXYGEN SATURATION: 93 % | WEIGHT: 226 LBS | DIASTOLIC BLOOD PRESSURE: 98 MMHG | SYSTOLIC BLOOD PRESSURE: 148 MMHG | BODY MASS INDEX: 31.64 KG/M2 | RESPIRATION RATE: 18 BRPM | HEIGHT: 71 IN | HEART RATE: 60 BPM

## 2020-09-24 PROBLEM — Z45.02 ICD (IMPLANTABLE CARDIOVERTER-DEFIBRILLATOR) BATTERY DEPLETION: Status: ACTIVE | Noted: 2020-09-24

## 2020-09-24 LAB
ANION GAP SERPL CALCULATED.3IONS-SCNC: 9 MMOL/L (ref 7–16)
BUN BLDV-MCNC: 22 MG/DL (ref 8–23)
CALCIUM SERPL-MCNC: 9.3 MG/DL (ref 8.6–10.2)
CHLORIDE BLD-SCNC: 101 MMOL/L (ref 98–107)
CO2: 27 MMOL/L (ref 22–29)
CREAT SERPL-MCNC: 1.1 MG/DL (ref 0.7–1.2)
GFR AFRICAN AMERICAN: >60
GFR NON-AFRICAN AMERICAN: >60 ML/MIN/1.73
GLUCOSE BLD-MCNC: 92 MG/DL (ref 74–99)
HCT VFR BLD CALC: 36.7 % (ref 37–54)
HEMOGLOBIN: 13.1 G/DL (ref 12.5–16.5)
MAGNESIUM: 1.9 MG/DL (ref 1.6–2.6)
MCH RBC QN AUTO: 29.1 PG (ref 26–35)
MCHC RBC AUTO-ENTMCNC: 35.7 % (ref 32–34.5)
MCV RBC AUTO: 81.6 FL (ref 80–99.9)
PDW BLD-RTO: 13.3 FL (ref 11.5–15)
PLATELET # BLD: 136 E9/L (ref 130–450)
PMV BLD AUTO: 11.8 FL (ref 7–12)
POTASSIUM SERPL-SCNC: 4.2 MMOL/L (ref 3.5–5)
RBC # BLD: 4.5 E12/L (ref 3.8–5.8)
SODIUM BLD-SCNC: 137 MMOL/L (ref 132–146)
WBC # BLD: 9.8 E9/L (ref 4.5–11.5)

## 2020-09-24 PROCEDURE — 85027 COMPLETE CBC AUTOMATED: CPT

## 2020-09-24 PROCEDURE — 99024 POSTOP FOLLOW-UP VISIT: CPT | Performed by: STUDENT IN AN ORGANIZED HEALTH CARE EDUCATION/TRAINING PROGRAM

## 2020-09-24 PROCEDURE — 36415 COLL VENOUS BLD VENIPUNCTURE: CPT

## 2020-09-24 PROCEDURE — 6360000002 HC RX W HCPCS: Performed by: INTERNAL MEDICINE

## 2020-09-24 PROCEDURE — 6370000000 HC RX 637 (ALT 250 FOR IP): Performed by: INTERNAL MEDICINE

## 2020-09-24 PROCEDURE — 2580000003 HC RX 258: Performed by: INTERNAL MEDICINE

## 2020-09-24 PROCEDURE — 80048 BASIC METABOLIC PNL TOTAL CA: CPT

## 2020-09-24 PROCEDURE — 83735 ASSAY OF MAGNESIUM: CPT

## 2020-09-24 PROCEDURE — 71045 X-RAY EXAM CHEST 1 VIEW: CPT

## 2020-09-24 PROCEDURE — G0378 HOSPITAL OBSERVATION PER HR: HCPCS

## 2020-09-24 PROCEDURE — 96374 THER/PROPH/DIAG INJ IV PUSH: CPT

## 2020-09-24 PROCEDURE — 6370000000 HC RX 637 (ALT 250 FOR IP): Performed by: NURSE PRACTITIONER

## 2020-09-24 RX ORDER — METOPROLOL TARTRATE 100 MG/1
100 TABLET ORAL 2 TIMES DAILY
Qty: 60 TABLET | Refills: 3 | Status: SHIPPED | OUTPATIENT
Start: 2020-09-24 | End: 2022-06-20

## 2020-09-24 RX ADMIN — BUSPIRONE HYDROCHLORIDE 15 MG: 5 TABLET ORAL at 14:44

## 2020-09-24 RX ADMIN — CETIRIZINE HYDROCHLORIDE 10 MG: 10 TABLET, FILM COATED ORAL at 10:12

## 2020-09-24 RX ADMIN — LISINOPRIL 40 MG: 20 TABLET ORAL at 10:12

## 2020-09-24 RX ADMIN — Medication 10 ML: at 10:14

## 2020-09-24 RX ADMIN — HYDROCODONE BITARTRATE AND ACETAMINOPHEN 1 TABLET: 7.5; 325 TABLET ORAL at 01:20

## 2020-09-24 RX ADMIN — ISOSORBIDE MONONITRATE 120 MG: 60 TABLET ORAL at 10:12

## 2020-09-24 RX ADMIN — MAGNESIUM GLUCONATE 500 MG ORAL TABLET 400 MG: 500 TABLET ORAL at 14:40

## 2020-09-24 RX ADMIN — METOPROLOL TARTRATE 100 MG: 50 TABLET, FILM COATED ORAL at 10:10

## 2020-09-24 RX ADMIN — Medication 2 G: at 01:13

## 2020-09-24 RX ADMIN — FLUTICASONE PROPIONATE 1 SPRAY: 50 SPRAY, METERED NASAL at 10:12

## 2020-09-24 RX ADMIN — SERTRALINE HYDROCHLORIDE 100 MG: 100 TABLET ORAL at 10:13

## 2020-09-24 RX ADMIN — ASPIRIN 81 MG CHEWABLE TABLET 81 MG: 81 TABLET CHEWABLE at 10:10

## 2020-09-24 ASSESSMENT — PAIN SCALES - GENERAL
PAINLEVEL_OUTOF10: 0
PAINLEVEL_OUTOF10: 0
PAINLEVEL_OUTOF10: 6
PAINLEVEL_OUTOF10: 4
PAINLEVEL_OUTOF10: 0
PAINLEVEL_OUTOF10: 0
PAINLEVEL_OUTOF10: 3

## 2020-09-24 ASSESSMENT — PAIN DESCRIPTION - ORIENTATION
ORIENTATION: LEFT
ORIENTATION: LEFT

## 2020-09-24 ASSESSMENT — PAIN DESCRIPTION - FREQUENCY
FREQUENCY: INTERMITTENT
FREQUENCY: INTERMITTENT

## 2020-09-24 ASSESSMENT — PAIN DESCRIPTION - DESCRIPTORS
DESCRIPTORS: ACHING;DISCOMFORT
DESCRIPTORS: ACHING;DISCOMFORT

## 2020-09-24 ASSESSMENT — PAIN DESCRIPTION - PAIN TYPE
TYPE: SURGICAL PAIN
TYPE: SURGICAL PAIN

## 2020-09-24 ASSESSMENT — PAIN DESCRIPTION - LOCATION
LOCATION: CHEST
LOCATION: CHEST

## 2020-09-24 NOTE — PROGRESS NOTES
Cardiac Electrophysiology Inpatient Progress Note    Elisa Lilly  1950  Date of Service: 9/24/2020  PCP: Michel Parker MD  Cardiologist: Hemalatha Quintanilla MD   Electrophysiologist: Aramis Dennis MD        Subjective: Elisa Lilly is seen in hospital follow-up and management of: ICD upgrade. 06/24/2020: Elisa Lilly is seen in hospital follow-up, yesterday he underwent placement of an RV ICD lead and his RV pacemaker lead was capped. Today he is without complaint and has normal device function, the dressing is clean dry and intact. This upgrade is due to VT seen on his pacemaker interrogation.      Patient Active Problem List   Diagnosis    PTSD (post-traumatic stress disorder)    Depression    Pacemaker    HTN (hypertension)    Spinal stenosis at L4-L5 level    Depression, major, recurrent, moderate (HCC)    Ventricular tachycardia (HCC)         Scheduled Medications:   aspirin  81 mg Oral Daily    lisinopril  40 mg Oral Daily    isosorbide mononitrate  120 mg Oral Daily    sertraline  100 mg Oral Daily    cetirizine  10 mg Oral Daily    fluticasone  1 spray Each Nostril Daily    tamsulosin  0.4 mg Oral Daily    busPIRone  15 mg Oral Daily    metoprolol tartrate  100 mg Oral BID    sodium chloride flush  10 mL Intravenous 2 times per day       Infusion Medications:      PRN Medications:  gabapentin, HYDROcodone-acetaminophen, sodium chloride flush, ondansetron    Allergies   Allergen Reactions    Bee Venom     Naproxen     Meloxicam Nausea And Vomiting       Wt Readings from Last 3 Encounters:   09/23/20 226 lb (102.5 kg)   09/15/20 226 lb (102.5 kg)   09/03/20 223 lb (101.2 kg)     Temp Readings from Last 3 Encounters:   09/24/20 97.9 °F (36.6 °C) (Oral)   09/15/20 97.6 °F (36.4 °C) (Temporal)   09/03/20 98.2 °F (36.8 °C)     BP Readings from Last 3 Encounters:   09/24/20 (!) 165/98   09/23/20 (!) 119/96   09/15/20 (!) 146/76     Pulse Readings from Last 3 Encounters: 09/24/20 60   09/15/20 61   09/03/20 70         Intake/Output Summary (Last 24 hours) at 9/24/2020 1205  Last data filed at 9/23/2020 1836  Gross per 24 hour   Intake 300 ml   Output 575 ml   Net -275 ml       ROS:   Constitutional: Negative for fever, activity change and appetite change. HENT: Negative for epistaxis. Eyes: Negative for diploplia, blurred vision. Respiratory: Negative for cough. Positive for chest tightness and hortness of breath. Negative of wheezing. Cardiovascular: pertinent positives in HPI  Gastrointestinal: Negative for abdominal pain and blood in stool. All other review of systems are negative       PHYSICAL EXAM:  Vitals:    09/23/20 2000 09/24/20 0000 09/24/20 0400 09/24/20 0900   BP: 116/79 125/80 120/67 (!) 165/98   Pulse: 72 70 69 60   Resp: 18 18 18 18   Temp: 98.2 °F (36.8 °C) 97.9 °F (36.6 °C) 98.2 °F (36.8 °C) 97.9 °F (36.6 °C)   TempSrc: Oral Temporal Temporal Oral   SpO2: 99% 99% 100% 92%   Weight:       Height:         Constitutional: Well-developed, no acute distress  Eyes: conjunctivae normal, no xanthelasma   Ears, Nose, Throat: oral mucosa moist, no cyanosis   CV: no JVD. Regular rate and rhythm. Normal S1S2 and no S3. No murmurs, rubs, or gallops.  PMI is nondisplaced  Lungs: clear to auscultation bilaterally, normal respiratory effort without used of accessory muscles  Abdomen: soft, non-tender, bowel sounds present, no masses or hepatomegaly   Musculoskeletal: no digital clubbing, no edema   Skin: warm, no rashes  Pacemaker site: dressing clean dry and intact, no brusing or edema     Pertinent Labs:  CBC:   Recent Labs     09/23/20  0720 09/24/20 0421   WBC 7.1 9.8   HGB 13.9 13.1   HCT 37.6 36.7*    136     BMP:  Recent Labs     09/23/20  0720 09/24/20 0421    137   K 4.4 4.2    101   CO2 25 27   BUN 21 22   CREATININE 1.0 1.1   CALCIUM 9.7 9.3     ABGs: No results found for: PHART, PO2ART, MDC2YVL  INR: No results for input(s): INR in the last 72 hours. BNP: No results for input(s): BNP in the last 72 hours. TSH: No results found for: TSH   Cardiac Injury Profile: No results for input(s): CKTOTAL, CKMB, CKMBINDEX, TROPONINI in the last 72 hours. Lipid Profile:   Lab Results   Component Value Date    TRIG 116 10/30/2019    HDL 51 10/30/2019    LDLCALC 141 10/30/2019    CHOL 215 10/30/2019      Hemoglobin A1C: No components found for: HGBA1C   Xray: Xr Chest Portable    Result Date: 2020  Patient MRN: [id number] : 1950 Age:  69 years Gender: Male Order Date: 2020 8:20 AM Exam: XR CHEST PORTABLE Number of Images: 1 view Indication:   post ICD upgrade post ICD upgrade Comparison: 2020 FINDINGS: Indwelling left sided AICD does not appear appreciably changed. There is a  shunt catheter as before on the right. There is notable aortic tortuosity as before. The heart and pulmonary vascularity are normal. Lungs are clear. Neither costophrenic angle appears blunted. There is no visible pneumothorax. No new abnormal findings. See above. Xr Chest Portable    Result Date: 2020  Patient MRN: [id number] : 1950 Age:  69 years Gender: Male Order Date: 2020 10:05 AM Exam: XR CHEST PORTABLE Number of Images: 1 views Indication:   Pacemaker placement and rule out pneumothorax Pacemaker placement and rule out pneumothorax Comparison: 2018 Findings: Portable anterior view demonstrates new pacemaker in place with leads at level of atrium and ventricle. No immediate complicating feature evident. Heart remains slightly enlarged with normal pulmonary vascularity. Lungs are clear. New pacemaker placement without immediate complicating feature         Pertinent Cardiac Testing:   Echocardiogram 20:     Stress Test 20:       Coronary angiogram 09/15/20: Left Main: Long.  Distal diffuse 30% stenosis. LAD: Moderately calcified.  Tortuous.  Mid eccentric diffuse 40  to 50% stenosis.  D1: Mid bifurcating diffuse 80% stenosis.  This is less than a 2  mm vessel at this location. Cx: Severely tortuous.  Proximal diffuse 20 to 30%.  Mid diffuse 30 to 40%.  Diffuse mild luminal irregularities. OM1: Tiny vessel. OM2: No angiographically significant stenosis. RCA: Dominant.  Severely tortuous.  Distal mild diffuse luminal irregularities. PDA: Small vessel. PLB: No angiographically significant stenosis. Telemetry9/24/2020: AP/AS-      ECG 9/24/2020: AP VS with RBBB and LAFB rate 60 bpm QRS 168ms, QTc 440ms     Device Interrogation ( 09/24/20 )  Make/Model [employer] Vigilant EL ICD  Mode DDDR 60/110  P wave: 5.3 mV  Impedance: 394 ohms   Threshold: 0.6 V @ 0.4 ms  RV R wave: 4.8 mV  Impedance: 333 ohms   Threshold: 0.4 V @ 0.4 ms  Pacing: A: 72%  RV: <1%    Battery Voltage/Longevity:  10+ years     Arrhythmias: none   Reprogramming included none   Overall device function is normal  All device programmable settings were evaluated per above and in the scanned document, along with iterative adjustments (capture thresholds) to assess and select the most appropriate final programming to provide for consistent delivery of the appropriate therapy and to verify function of the device. I have independently reviewed all of the ECGs and rhythm strips per above. I have personally reviewed the laboratory, cardiac diagnostic and radiographic testing as outlined above. I have reviewed previous records noted in [address]. Impression:    1. Sustained ventricular tachycardia  - Documented up to 4 minutes on pacemaker interrogation.  - Echocardiogram 4/28/20 showed LV EF of 55-60%. - Nuclear stress test 4/28/20 showed no ischemia.  - C 09/15/20 no intervention        2. ICD in situ  - Pacemaker implanted in March if 2014  - Indication high grade AV block. - Pacemake upgrated to ICD 09/23/20 due to above   - No longer MRI compadable RV lead capped and abandoned.       3.  High grade AV block - Status post pacemaker implantation in March of 2014      4. Hypertension  - Well controlled. - On Cardizem, Zestril and Imdur.     5. Hydrocephalus     6. RBBB  - Chronic.     7. Syncope  - Can not exclude from arrhythmic cause. Recommendations:    1. Discharge today   2. Follow up in the device clinic in 2 weeks   3. Standard arm and wound restrictions for 6 weeks     Thank you for allowing me to participate in your patient's care.     Discussed with Dr. Kaylen Mckinnon   Electronically signed by GLYNN Klein CNP on 9/24/2020 at 12:18 PM  [facility]    Attending Supervising Physicians NICKIE Boateng 106  I was present with the nurse practitioner during the history and exam. I discussed the findings and plans with the nurse practitioner and agree as documented in his note     Electronically signed by Raquel Joel DO on 9/24/20 at 2:31 PM EDT

## 2020-09-24 NOTE — CARE COORDINATION
Care Coordination: Met with pt to discuss transition of care upon discharge. Lives at home with wife. . Steps in home but he states pta , he was completely independent and still is. No hx of hhc, dme or favian. Drives. No concerns regarding medication coverage. Uses [facility] in [place]. Follows with Dr Amador Garcia. Wife will be available for transport home upon discharge. He states he has a Inbox monitor - that he brought from home in box and they told him to take it back home with him. I asked him to check with RN or Cardiologist for specifics.  He verbalized understanding    Isaac Mendoza

## 2020-09-25 ENCOUNTER — TELEPHONE (OUTPATIENT)
Dept: ADMINISTRATIVE | Age: 70
End: 2020-09-25

## 2020-09-25 NOTE — TELEPHONE ENCOUNTER
I called Elenita Briscoe to see how he is doing since his PPM to ICD upgrade done on 9/23/20. There was no answer & no ability to leave a message as his mailbox has not been set up.

## 2020-10-05 ENCOUNTER — TELEPHONE (OUTPATIENT)
Dept: NON INVASIVE DIAGNOSTICS | Age: 70
End: 2020-10-05

## 2020-10-08 ENCOUNTER — NURSE ONLY (OUTPATIENT)
Dept: NON INVASIVE DIAGNOSTICS | Age: 70
End: 2020-10-08
Payer: MEDICARE

## 2020-10-08 VITALS — TEMPERATURE: 97.6 F

## 2020-10-08 PROCEDURE — 93283 PRGRMG EVAL IMPLANTABLE DFB: CPT | Performed by: INTERNAL MEDICINE

## 2020-10-15 ENCOUNTER — HOSPITAL ENCOUNTER (OUTPATIENT)
Age: 70
Discharge: HOME | End: 2020-10-17
Payer: MEDICARE

## 2020-10-15 LAB
ANION GAP SERPL CALCULATED.3IONS-SCNC: 12 MMOL/L (ref 7–16)
BUN BLDV-MCNC: 26 MG/DL (ref 8–23)
CALCIUM SERPL-MCNC: 9.7 MG/DL (ref 8.6–10.2)
CHLORIDE BLD-SCNC: 103 MMOL/L (ref 98–107)
CO2: 24 MMOL/L (ref 22–29)
CREAT SERPL-MCNC: 1.3 MG/DL (ref 0.7–1.2)
GFR AFRICAN AMERICAN: >60
GFR NON-AFRICAN AMERICAN: >60 ML/MIN/1.73
GLUCOSE BLD-MCNC: 84 MG/DL (ref 74–99)
POTASSIUM SERPL-SCNC: 3.8 MMOL/L (ref 3.5–5)
SODIUM BLD-SCNC: 139 MMOL/L (ref 132–146)

## 2020-10-15 PROCEDURE — 36415 COLL VENOUS BLD VENIPUNCTURE: CPT

## 2020-10-15 PROCEDURE — 80048 BASIC METABOLIC PNL TOTAL CA: CPT

## 2020-10-16 ENCOUNTER — OFFICE VISIT (OUTPATIENT)
Dept: FAMILY MEDICINE CLINIC | Age: 70
End: 2020-10-16
Payer: MEDICARE

## 2020-10-16 VITALS
BODY MASS INDEX: 30.94 KG/M2 | TEMPERATURE: 96.6 F | WEIGHT: 221 LBS | OXYGEN SATURATION: 98 % | DIASTOLIC BLOOD PRESSURE: 96 MMHG | HEART RATE: 67 BPM | HEIGHT: 71 IN | SYSTOLIC BLOOD PRESSURE: 164 MMHG

## 2020-10-16 PROCEDURE — 99213 OFFICE O/P EST LOW 20 MIN: CPT | Performed by: FAMILY MEDICINE

## 2020-10-16 RX ORDER — ISOSORBIDE MONONITRATE 120 MG/1
TABLET, EXTENDED RELEASE ORAL
COMMUNITY
Start: 2020-03-02 | End: 2022-03-09

## 2020-10-16 RX ORDER — TAMSULOSIN HYDROCHLORIDE 0.4 MG/1
0.4 CAPSULE ORAL DAILY
Qty: 30 CAPSULE | Refills: 2 | Status: SHIPPED
Start: 2020-10-16 | End: 2021-01-05 | Stop reason: SDUPTHER

## 2020-10-16 NOTE — PROGRESS NOTES
[facility]  Office Progress Note - Dr. Claudette Hawkins  10/16/20    Chief Complaint   Patient presents with   [facility] Follow-up     [address] 10/13/20 Decreased urine output. Discuss kidney levels. HPI: Patient presents for emergency room and almost urgent care follow-up. He recently had elevated serum creatinine without certain cause. It is unclear how he ended up in [facility] emergency, but he was feeling like he was not urinating as much as normal.  On presentation his creatinine was 2.6. They hydrated him, he declined admission and they repeated the creatinine. Decreased down to 2.4. We repeated that yesterday. His creatinine is down to 1.3. He is drinking water regularly. He is not taking any NSAIDs. He did have a heart catheterization within the past month or so. He did have a pacemaker replacement with a defibrillator within the past couple weeks. Unclear if he had any diet in association with this procedure. The incision appears well-healing without signs of cellulitis. Reports that he had been urinating frequently at night, believed to be secondary to an enlarged prostate. He had been taking tamsulosin which helped improve his symptoms but he was worried that decreased urination and that his kidneys were not working well. He has run out of tamsulosin. He was taking super beta prostate instead of this, but has also run out of that medication.     ______________________________________________________  Family History   Problem Relation Age of Onset    High Blood Pressure Father     Diabetes Sister     Heart Disease Brother     Diabetes Sister     Diabetes Sister        Past Surgical History:   Procedure Laterality Date    BACK SURGERY      CARDIAC CATHETERIZATION  09/15/2020    Dr. Samantha Longoria  9/223/20    UPGRADE PPM TO DUAL ICD    ([facility])   DR. Adolph Ricardo     CARDIAC PACEMAKER PLACEMENT  2014    CHOLECYSTECTOMY  1999    EYE SURGERY Left  FOOT SURGERY      VENTRICULOPERITONEAL SHUNT         Social History     Tobacco Use    Smoking status: Former Smoker     Packs/day: 1.00     Years: 1.00     Pack years: 1.00     Types: Cigarettes     Last attempt to quit: Juni Museheidi     Years since quittin.8    Smokeless tobacco: Never Used    Tobacco comment: quit 30 years   Substance Use Topics    Alcohol use: Yes     Comment: rare    Drug use: No     ______________________________________________________  ROS: POSITIVE: Urinary changes  Otherwise:  No CP, No palpitations,   No sob, No cough,   No abd pain, No heartburn,   No headaches, No vision changes, No hearing changes,   No tingling, No numbness, No weakness,   No bowel changes, No hematochezia, No melena,  +bladder changes, No hematuria  No skin rashes, No skin lesions. No polyuria, polydipsia, polyphagia. Stable mood. ROS otherwise negative unless as listed in HPI. Chart reviewed and updated where appropriate for PMH, Fam, and Soc Hx.  _______________________________________________________  Physical Exam   BP (!) 164/96 (Site: Right Upper Arm, Position: Sitting, Cuff Size: Large Adult)   Pulse 67   Temp 96.6 °F (35.9 °C) (Temporal)   Ht 5' 11\" (1.803 m)   Wt 221 lb (100.2 kg)   SpO2 98%   BMI 30.82 kg/m²   Wt Readings from Last 3 Encounters:   10/16/20 221 lb (100.2 kg)   20 226 lb (102.5 kg)   09/15/20 226 lb (102.5 kg)       Constitutional:    He is oriented to person, place, and time. He appears well-developed and well-nourished. Cardiovascular:    Normal rate, regular rhythm and normal heart sounds. No murmur. No gallop and no friction rub. Pulmonary/Chest:    Effort normal and breath sounds normal.    No wheezes. No rales or rhonchi. Abdominal:    Soft. Bowel sounds are normal.    No distension. No tenderness. Musculoskeletal:    Normal range of motion. No joint swelling noted. No peripheral edema. Skin:    Skin is warm and dry.     No rashes, lesions. Psychiatric:    He has a normal mood and affect. Normal groom and dress. No SI or HI.   ________________________________________________________  Current Outpatient Medications on File Prior to Visit   Medication Sig Dispense Refill    isosorbide mononitrate (IMDUR) 120 MG extended release tablet TAKE ONE TABLET BY MOUTH EVERY DAY      metoprolol tartrate (LOPRESSOR) 100 MG tablet Take 1 tablet by mouth 2 times daily 60 tablet 3    HYDROcodone-acetaminophen (NORCO) 7.5-325 MG per tablet TAKE ONE TABLET BY MOUTH FOUR TIMES DAILY AS NEEDED FOR PAIN      busPIRone (BUSPAR) 10 MG tablet Take 15 mg by mouth       gabapentin (NEURONTIN) 300 MG capsule Take 1 capsule by mouth 3 times daily for 90 days. (Patient taking differently: Take 300 mg by mouth 3 times daily as needed. ) 270 capsule 0    cetirizine (ZYRTEC) 10 MG tablet Take 1 tablet by mouth daily 30 tablet 1    fluticasone (FLONASE ALLERGY RELIEF) 50 MCG/ACT nasal spray 1 spray by Each Nostril route daily 1 Bottle 1    sertraline (ZOLOFT) 100 MG tablet Take 1 tablet by mouth daily For mood. 30 tablet 5    lisinopril (PRINIVIL;ZESTRIL) 40 MG tablet Take 1 tablet by mouth daily For blood pressure (Patient taking differently: Take 20 mg by mouth daily For blood pressure.) 90 tablet 1    aspirin (ASPIRIN 81) 81 MG chewable tablet Take by mouth daily        No current facility-administered medications on file prior to visit. Patient Active Problem List   Diagnosis Code    PTSD (post-traumatic stress disorder) F43.10    Depression F32.9    Pacemaker Z95.0    HTN (hypertension) I10    Spinal stenosis at L4-L5 level M48.061    Depression, major, recurrent, moderate (HCC) F33.1    Ventricular tachycardia (HCC) I47.2    ICD (implantable cardioverter-defibrillator) battery depletion Z45.02     ________________________________________________________  Assessment / Madyson Randle was seen today for ed follow-up.     Diagnoses and all orders for this visit:    Elevated serum creatinine  -     Basic Metabolic Panel; Future  He had some recent EDD. His baseline creatinine is 1.0-1.1. This was most elevated up to 2.6 and has now receded to 1.3 as of yesterday. He hydrates regularly. He is avoiding NSAIDs. This may have had something to do with recent dye administration for cardiac procedures. Recommend repeating BMP in about 1 month and then follow-up a week or 2 later. Benign prostatic hyperplasia with nocturia  Is decreased urination may have actually been improvement of his BPH symptoms. Explained that mild increased urination does not have anything to do with his creatinine or GFR. Refill tamsulosin as he has had improvement in nocturia symptoms  Other orders  -     tamsulosin (FLOMAX) 0.4 MG capsule; Take 1 capsule by mouth daily Night time urination help. Return in about 6 weeks (around 11/27/2020). Patient counseled to follow up sooner or seek more acute care if symptoms worsening or not improving according to plan. .     Electronically signed by Nikita Javier MD on 10/16/2020    This note may have been created using dictation software.  Efforts were made to reduce grammatical or syntax errors, but some may persist.

## 2020-10-22 ENCOUNTER — TELEPHONE (OUTPATIENT)
Dept: NON INVASIVE DIAGNOSTICS | Age: 70
End: 2020-10-22

## 2020-10-22 NOTE — TELEPHONE ENCOUNTER
Latitude alert for ATP x 2 on 10-, second attempt successful. On Lopressor 100 mg twice daily. Also 2 ATR, up to 1 minute and 47 seconds duration on 10-. Phone call to patient. ..spoke with patient who denies any symptoms yesterday, states he has been feeling good. Does report that he missed one dose of medication (lopressor) yesterday because he was running late for an appointment. Reinforced the importance of strict medication compliance. He verbalized understanding. Will continue to monitor. Will review with Dr. Amalia Smith.     Noé Pardo RN, BSN  [facility]

## 2020-10-26 ENCOUNTER — HOSPITAL ENCOUNTER (OUTPATIENT)
Age: 70
Discharge: HOME | End: 2020-10-28
Payer: MEDICARE

## 2020-10-26 ENCOUNTER — TELEPHONE (OUTPATIENT)
Dept: NON INVASIVE DIAGNOSTICS | Age: 70
End: 2020-10-26

## 2020-10-26 LAB
ALBUMIN SERPL-MCNC: 3.7 G/DL (ref 3.5–5.2)
ALP BLD-CCNC: 45 U/L (ref 40–129)
ALT SERPL-CCNC: 13 U/L (ref 0–40)
ANION GAP SERPL CALCULATED.3IONS-SCNC: 12 MMOL/L (ref 7–16)
AST SERPL-CCNC: 15 U/L (ref 0–39)
BILIRUB SERPL-MCNC: 0.3 MG/DL (ref 0–1.2)
BUN BLDV-MCNC: 22 MG/DL (ref 8–23)
CALCIUM SERPL-MCNC: 9.1 MG/DL (ref 8.6–10.2)
CHLORIDE BLD-SCNC: 108 MMOL/L (ref 98–107)
CO2: 23 MMOL/L (ref 22–29)
CREAT SERPL-MCNC: 1 MG/DL (ref 0.7–1.2)
GFR AFRICAN AMERICAN: >60
GFR NON-AFRICAN AMERICAN: >60 ML/MIN/1.73
GLUCOSE BLD-MCNC: 85 MG/DL (ref 74–99)
POTASSIUM SERPL-SCNC: 4.4 MMOL/L (ref 3.5–5)
SODIUM BLD-SCNC: 143 MMOL/L (ref 132–146)
T4 FREE: 0.87 NG/DL (ref 0.93–1.7)
TOTAL PROTEIN: 6.8 G/DL (ref 6.4–8.3)
TSH SERPL DL<=0.05 MIU/L-ACNC: 0.33 UIU/ML (ref 0.27–4.2)

## 2020-10-26 PROCEDURE — 80053 COMPREHEN METABOLIC PANEL: CPT

## 2020-10-26 PROCEDURE — 84443 ASSAY THYROID STIM HORMONE: CPT

## 2020-10-26 PROCEDURE — 36415 COLL VENOUS BLD VENIPUNCTURE: CPT

## 2020-10-26 PROCEDURE — 84439 ASSAY OF FREE THYROXINE: CPT

## 2020-10-26 NOTE — TELEPHONE ENCOUNTER
Spoke with patient regarding Dr. Talya Lee recommendations. He verbalized understanding. Will go to [facility] today for blood work. Uses [facility] in [place] for pharmacy.      Kevin Boothe RN, BSN  [facility]

## 2020-10-28 ENCOUNTER — TELEPHONE (OUTPATIENT)
Dept: NON INVASIVE DIAGNOSTICS | Age: 70
End: 2020-10-28

## 2020-10-28 RX ORDER — AMIODARONE HYDROCHLORIDE 200 MG/1
200 TABLET ORAL DAILY
COMMUNITY
End: 2020-12-23 | Stop reason: SINTOL

## 2020-10-28 NOTE — TELEPHONE ENCOUNTER
I spoke to Tim Gilbert and let him know that his Amio prescription was called into to his pharmacy. He will begin today. I scheduled a follow up in December, but he has a remote check through [facility] coming up. Bret verbalized understanding.

## 2020-10-28 NOTE — TELEPHONE ENCOUNTER
----- Message from Heidy Sarabia MD sent at 10/27/2020  2:30 PM EDT -----  Yes. Thanks.  ----- Message -----  From: Will Cooper  Sent: 10/27/2020   1:16 PM EDT  To: Heidy Sarabia MD    Ok to start? Amiodarone 200 mg tid for 1 week then 200 mg bid for 1 week then 200 mg daily.

## 2020-11-23 ENCOUNTER — TELEPHONE (OUTPATIENT)
Dept: NON INVASIVE DIAGNOSTICS | Age: 70
End: 2020-11-23

## 2020-11-23 NOTE — TELEPHONE ENCOUNTER
Phone call to patient who states he \"thinks\" he did not miss any doses of medication recently. Currently at hospital with wife. Reinforced the importance of strict medication compliance especially to avoid future shocks. Pt voiced understanding. He will call our office if he needs any refills on Amiodarone or Lopressor.      Juana Hadley RN, BSN  [facility]

## 2020-11-30 RX ORDER — GABAPENTIN 300 MG/1
300 CAPSULE ORAL 3 TIMES DAILY
Qty: 270 CAPSULE | Refills: 0 | Status: SHIPPED
Start: 2020-11-30 | End: 2021-01-05 | Stop reason: SDUPTHER

## 2020-11-30 NOTE — TELEPHONE ENCOUNTER
Name of Medication(s) Requested:  Gabapentin    Pharmacy Requested:   [facility]    Medication(s) pended? [x] Yes  [] No    Last Appointment:  10/16/2020    Future appts:  Future Appointments   Date Time Provider Torey Gardunoi   12/14/2020  2:45 PM Yang Liang MD [facility]   12/23/2020  9:35 AM SCHEDULE, REMOTE CHECK [place] [facility]   1/5/2021  2:00 PM Lauren Huang  [address]        Does patient need call back?   [] Yes  [x] No

## 2020-12-23 ENCOUNTER — TELEPHONE (OUTPATIENT)
Dept: NON INVASIVE DIAGNOSTICS | Age: 70
End: 2020-12-23

## 2020-12-23 ENCOUNTER — NURSE ONLY (OUTPATIENT)
Dept: NON INVASIVE DIAGNOSTICS | Age: 70
End: 2020-12-23
Payer: MEDICARE

## 2020-12-23 DIAGNOSIS — Z45.02 ICD (IMPLANTABLE CARDIOVERTER-DEFIBRILLATOR) BATTERY DEPLETION: Primary | ICD-10-CM

## 2020-12-23 DIAGNOSIS — I50.22 CHRONIC SYSTOLIC CONGESTIVE HEART FAILURE (HCC): ICD-10-CM

## 2020-12-23 DIAGNOSIS — I47.20 VENTRICULAR TACHYCARDIA: ICD-10-CM

## 2020-12-23 PROCEDURE — 93295 DEV INTERROG REMOTE 1/2/MLT: CPT | Performed by: INTERNAL MEDICINE

## 2020-12-23 PROCEDURE — 93296 REM INTERROG EVL PM/IDS: CPT | Performed by: INTERNAL MEDICINE

## 2020-12-23 PROCEDURE — 93297 REM INTERROG DEV EVAL ICPMS: CPT | Performed by: INTERNAL MEDICINE

## 2020-12-23 RX ORDER — MEXILETINE HYDROCHLORIDE 150 MG/1
150 CAPSULE ORAL 2 TIMES DAILY
Qty: 180 CAPSULE | Refills: 1 | Status: SHIPPED
Start: 2020-12-23 | End: 2021-01-26 | Stop reason: SDUPTHER

## 2020-12-23 RX ORDER — MEXILETINE HYDROCHLORIDE 150 MG/1
150 CAPSULE ORAL 2 TIMES DAILY
COMMUNITY
End: 2020-12-23 | Stop reason: SDUPTHER

## 2020-12-23 NOTE — TELEPHONE ENCOUNTER
----- Message from Yang Liang MD sent at 12/23/2020  9:45 AM EST -----  Can discontinue Amiodarone and start Mexiletine 150 mg bid. Thanks.  ----- Message -----  From: Anayeli Sánchez  Sent: 12/23/2020   9:02 AM EST  To: Yang Liang MD    Patient called in stating since starting Amiodarone he has developed hives on both arms and legs. Please advise, thank you.

## 2020-12-23 NOTE — TELEPHONE ENCOUNTER
Spoke with patient let him know [facility] would like him to stop Amiodarone and start Mexiletine 150 mg BID. Patient verbalized understanding, script sent to pharmacy.

## 2021-01-05 ENCOUNTER — OFFICE VISIT (OUTPATIENT)
Dept: FAMILY MEDICINE CLINIC | Age: 71
End: 2021-01-05
Payer: MEDICARE

## 2021-01-05 VITALS
HEART RATE: 60 BPM | TEMPERATURE: 97.5 F | DIASTOLIC BLOOD PRESSURE: 84 MMHG | OXYGEN SATURATION: 96 % | BODY MASS INDEX: 30.66 KG/M2 | SYSTOLIC BLOOD PRESSURE: 136 MMHG | WEIGHT: 219 LBS | HEIGHT: 71 IN

## 2021-01-05 DIAGNOSIS — Z96.652 HISTORY OF LEFT KNEE REPLACEMENT: ICD-10-CM

## 2021-01-05 DIAGNOSIS — F41.9 ANXIETY: Primary | ICD-10-CM

## 2021-01-05 DIAGNOSIS — G89.29 CHRONIC RIGHT SHOULDER PAIN: ICD-10-CM

## 2021-01-05 DIAGNOSIS — L27.0 DRUG RASH: ICD-10-CM

## 2021-01-05 DIAGNOSIS — M25.562 CHRONIC PAIN OF LEFT KNEE: ICD-10-CM

## 2021-01-05 DIAGNOSIS — M25.50 POLYARTHRALGIA: ICD-10-CM

## 2021-01-05 DIAGNOSIS — G89.29 CHRONIC PAIN OF LEFT KNEE: ICD-10-CM

## 2021-01-05 DIAGNOSIS — M25.511 CHRONIC RIGHT SHOULDER PAIN: ICD-10-CM

## 2021-01-05 PROCEDURE — 99214 OFFICE O/P EST MOD 30 MIN: CPT | Performed by: FAMILY MEDICINE

## 2021-01-05 RX ORDER — GABAPENTIN 300 MG/1
300 CAPSULE ORAL 3 TIMES DAILY
Qty: 270 CAPSULE | Refills: 0 | Status: SHIPPED
Start: 2021-01-05 | End: 2021-03-26 | Stop reason: SDUPTHER

## 2021-01-05 RX ORDER — TAMSULOSIN HYDROCHLORIDE 0.4 MG/1
0.4 CAPSULE ORAL DAILY
Qty: 30 CAPSULE | Refills: 5 | Status: SHIPPED
Start: 2021-01-05 | End: 2021-02-18 | Stop reason: SDUPTHER

## 2021-01-05 RX ORDER — BUSPIRONE HYDROCHLORIDE 10 MG/1
10 TABLET ORAL 3 TIMES DAILY
Qty: 90 TABLET | Refills: 2 | Status: SHIPPED
Start: 2021-01-05 | End: 2021-06-17 | Stop reason: SDUPTHER

## 2021-01-05 RX ORDER — LANOLIN ALCOHOL/MO/W.PET/CERES
CREAM (GRAM) TOPICAL
COMMUNITY
Start: 2020-11-05 | End: 2021-06-17

## 2021-01-05 RX ORDER — LISINOPRIL 20 MG/1
20 TABLET ORAL DAILY
Qty: 90 TABLET | Refills: 1 | Status: SHIPPED
Start: 2021-01-05 | End: 2021-06-17 | Stop reason: SDUPTHER

## 2021-01-05 RX ORDER — OMEPRAZOLE 20 MG/1
20 CAPSULE, DELAYED RELEASE ORAL PRN
COMMUNITY
Start: 2020-03-02 | End: 2021-06-17 | Stop reason: SDUPTHER

## 2021-01-05 RX ORDER — SERTRALINE HYDROCHLORIDE 100 MG/1
100 TABLET, FILM COATED ORAL DAILY
Qty: 30 TABLET | Refills: 5 | Status: SHIPPED
Start: 2021-01-05 | End: 2021-05-11

## 2021-01-05 NOTE — PROGRESS NOTES
Refill    omeprazole (PRILOSEC) 20 MG delayed release capsule Take by mouth      magnesium oxide (MAG-OX) 400 (240 Mg) MG tablet TAKE ONE TABLET BY MOUTH TWICE DAILY      mexiletine (MEXITIL) 150 MG capsule Take 1 capsule by mouth 2 times daily 180 capsule 1    isosorbide mononitrate (IMDUR) 120 MG extended release tablet TAKE ONE TABLET BY MOUTH EVERY DAY      metoprolol tartrate (LOPRESSOR) 100 MG tablet Take 1 tablet by mouth 2 times daily 60 tablet 3    HYDROcodone-acetaminophen (NORCO) 7.5-325 MG per tablet TAKE ONE TABLET BY MOUTH FOUR TIMES DAILY AS NEEDED FOR PAIN      cetirizine (ZYRTEC) 10 MG tablet Take 1 tablet by mouth daily 30 tablet 1    fluticasone (FLONASE ALLERGY RELIEF) 50 MCG/ACT nasal spray 1 spray by Each Nostril route daily 1 Bottle 1    aspirin (ASPIRIN 81) 81 MG chewable tablet Take by mouth daily        No current facility-administered medications on file prior to visit. Patient Active Problem List   Diagnosis Code    PTSD (post-traumatic stress disorder) F43.10    Depression F32.9    Pacemaker Z95.0    HTN (hypertension) I10    Spinal stenosis at L4-L5 level M48.061    Depression, major, recurrent, moderate (HCC) F33.1    Ventricular tachycardia (HCC) I47.2    ICD (implantable cardioverter-defibrillator) battery depletion Z45.02     ________________________________________________________  Assessment / Sangita Doty was seen today for rash and joint pain. Diagnoses and all orders for this visit:    Anxiety, worsened recently. -  Cont   sertraline (ZOLOFT) 100 MG tablet; Take 1 tablet by mouth daily For mood. -   INC freq of  busPIRone (BUSPAR) 10 MG tablet; Take 1 tablet by mouth 3 times daily  Depression stable to improved. Drug rash  Resolving off amio.      Polyarthralgia  -     External Referral To Orthopedic Surgery    Chronic pain of left knee  -     External Referral To Orthopedic Surgery    History of left knee replacement  -     External Referral To Orthopedic Surgery    Chronic right shoulder pain  -     External Referral To Orthopedic Surgery    He would like to consult with Dr. Jeannie Rudolph regarding his current joint status. Other orders  -     tamsulosin (FLOMAX) 0.4 MG capsule; Take 1 capsule by mouth daily Night time urination help. -     lisinopril (PRINIVIL;ZESTRIL) 20 MG tablet; Take 1 tablet by mouth daily For blood pressure.  -     gabapentin (NEURONTIN) 300 MG capsule; Take 1 capsule by mouth 3 times daily for 90 days. Return in about 3 months (around 4/5/2021). Patient counseled to follow up sooner or seek more acute care if symptoms worsening or not improving according to plan. .     Electronically signed by Fabio Martínez MD on 1/8/2021  Please note that >30 minutes was spent face-to-face with patient gathering history, performing physical exam, discussing findings, counseling patient, and determining plan forward, documenting and coordinating care. All questions addressed and answered. This note may have been created using dictation software.  Efforts were made to reduce grammatical or syntax errors, but some may persist.

## 2021-01-15 NOTE — PROGRESS NOTES
See [facility] report. Remote monitoring reviewed over a 90 day period. End of 90 day monitoring period date of service 1-. Remote txs from 12- and 1- reviewed. ATP on 1-. Has OV with Dr. Marvin Altamirano on 1-.     Deysi Cross RN, BSN  [facility]

## 2021-01-20 ENCOUNTER — TELEPHONE (OUTPATIENT)
Dept: FAMILY MEDICINE CLINIC | Age: 71
End: 2021-01-20

## 2021-01-20 NOTE — TELEPHONE ENCOUNTER
Bret calling to ask if you will order a lift chair for him due to the increased pain in his back. He sees pain doctors for the pain, but they told him the PCP will need to order that for him.

## 2021-01-25 NOTE — PROGRESS NOTES
Cardiac Electrophysiology Outpatient Progress Note    Kierra Pollard  1950  Date of Service: 1/26/2021  PCP: Cherry Sepulveda MD  Cardiologist: Ahsan Perla MD   Electrophysiologist: Louise Gonzáles MD        Subjective: Kierra Pollard is seen in cardiac electrophysiology clinic for ICD management. He underwent ICD upgrade in September 2020 due to sustained ventricular tachycardia. He was started on Amiodarone due to recurrent VT required ATPs and ICD shock. His Amiodarone was discontinued in December 2020 due to allergic reaction. He was started on Mexiletine. His recent interrogation showed VT s/p ATP x 2 on 1/14/21. The patient currently feels well and offers no complaints from a device POV. The device site looks well healed and free from infection or erosion. The patient denies any chest pain, dyspnea, palpitations, dizziness, syncope, orthopnea or paroxysmal nocturnal dyspnea. The patient continues to be followed remotely. Patient Active Problem List   Diagnosis    PTSD (post-traumatic stress disorder)    Depression    Pacemaker    HTN (hypertension)    Spinal stenosis at L4-L5 level    Depression, major, recurrent, moderate (HCC)    Ventricular tachycardia (HCC)    ICD (implantable cardioverter-defibrillator) battery depletion       Current Outpatient Medications   Medication Sig Dispense Refill    mexiletine (MEXITIL) 150 MG capsule Take 1 capsule by mouth 3 times daily 270 capsule 2    omeprazole (PRILOSEC) 20 MG delayed release capsule Take 20 mg by mouth as needed       magnesium oxide (MAG-OX) 400 (240 Mg) MG tablet TAKE ONE TABLET BY MOUTH TWICE DAILY      tamsulosin (FLOMAX) 0.4 MG capsule Take 1 capsule by mouth daily Night time urination help. 30 capsule 5    sertraline (ZOLOFT) 100 MG tablet Take 1 tablet by mouth daily For mood. 30 tablet 5    lisinopril (PRINIVIL;ZESTRIL) 20 MG tablet Take 1 tablet by mouth daily For blood pressure.  90 tablet 1    gabapentin (NEURONTIN) 300 MG capsule Take 1 capsule by mouth 3 times daily for 90 days. 270 capsule 0    busPIRone (BUSPAR) 10 MG tablet Take 1 tablet by mouth 3 times daily 90 tablet 2    isosorbide mononitrate (IMDUR) 120 MG extended release tablet TAKE ONE TABLET BY MOUTH EVERY DAY      metoprolol tartrate (LOPRESSOR) 100 MG tablet Take 1 tablet by mouth 2 times daily 60 tablet 3    HYDROcodone-acetaminophen (NORCO) 7.5-325 MG per tablet TAKE ONE TABLET BY MOUTH FOUR TIMES DAILY AS NEEDED FOR PAIN      cetirizine (ZYRTEC) 10 MG tablet Take 1 tablet by mouth daily 30 tablet 1    fluticasone (FLONASE ALLERGY RELIEF) 50 MCG/ACT nasal spray 1 spray by Each Nostril route daily 1 Bottle 1    aspirin (ASPIRIN 81) 81 MG chewable tablet Take by mouth daily        No current facility-administered medications for this visit. Allergies   Allergen Reactions    Bee Venom     Naproxen     Meloxicam Nausea And Vomiting     ROS:   Constitutional: Negative for fever, activity change and appetite change. HENT: Negative for epistaxis. Eyes: Negative for diploplia, blurred vision. Respiratory: Negative for cough. Positive for chest tightness and hortness of breath. Negative of wheezing. Cardiovascular: pertinent positives in HPI  Gastrointestinal: Negative for abdominal pain and blood in stool. All other review of systems are negative     PHYSICAL EXAM:  Vitals:    01/26/21 1008   BP: (!) 142/80   Pulse: 60   Resp: 18   Temp: 97.5 °F (36.4 °C)   TempSrc: Temporal   Weight: 218 lb (98.9 kg)   Height: 5' 11\" (1.803 m)     Constitutional: Well-developed, no acute distress  Eyes: conjunctivae normal, no xanthelasma   Ears, Nose, Throat: oral mucosa moist, no cyanosis   CV: no JVD. Regular rate and rhythm. Normal S1S2 and no S3. No murmurs, rubs, or gallops.  PMI is nondisplaced  Lungs: clear to auscultation bilaterally, normal respiratory effort without used of accessory muscles  Abdomen: soft, non-tender, bowel sounds present, no masses or hepatomegaly   Musculoskeletal: no digital clubbing, no edema   Skin: warm, no rashes  Device site: well healed. No erosion, infection or migration. Pertinent Cardiac Testing:     Echocardiogram 4/28/20:     Stress Test 4/28/20:       Coronary angiogram 09/15/20: Left Main: Long.  Distal diffuse 30% stenosis. LAD: Moderately calcified.  Tortuous.  Mid eccentric diffuse 40  to 50% stenosis. D1: Mid bifurcating diffuse 80% stenosis.  This is less than a 2  mm vessel at this location. Cx: Severely tortuous.  Proximal diffuse 20 to 30%.  Mid diffuse 30 to 40%.  Diffuse mild luminal irregularities. OM1: Tiny vessel. OM2: No angiographically significant stenosis. RCA: Dominant.  Severely tortuous.  Distal mild diffuse luminal irregularities. PDA: Small vessel. PLB: No angiographically significant stenosis. ECG 1/26/2021: AP VS with RBBB and LAFB rate 60 bpm  ms, QTc 440 ms     Device Interrogation 1/26/21:  Make/Model [employer]Dimensions scientific Vigilant EL ICD  Mode DDDR 60/110  P wave: paced  Impedance: 373 ohms   Threshold: 0.7 V @ 0.4 ms  RV R wave: paced  Impedance: 380 ohms   Threshold: 0.7 V @ 0.4 ms  Pacing: A: 86%  RV: 28%    Battery Voltage/Longevity: 12.5 years    Charge time: 9.7 seconds   Arrhythmias:   - longest AT/AF 1 minute to <1 hour  - VT therapy delievered on 10/21/2020  bpm  - 10/24/2020 ATP  1/14/2021 ATP x2  Reprogramming included none   Overall device function is normal    All device programmable settings were evaluated per above and in the scanned document, along with iterative adjustments (capture thresholds) to assess and select the most appropriate final programming to provide for consistent delivery of the appropriate therapy and to verify function of the device. Impression:    1. Sustained ventricular tachycardia  - Documented up to 4 minutes on pacemaker interrogation.  - Echocardiogram 4/28/20 showed LV EF of 55-60%.   - Nuclear stress test 4/28/20 showed no ischemia.  - [facility] 09/15/20 no intervention   - Status post ICD upgrade 9/23/20  - Started on Amiodarone 10/26/20 due to recurrent VT required ATPs and ICD shocks and discontinued 12/23/20 due to allergic reaction.  - Started on Mexiletine 12/23/20.  - On Lopressor.  - Still having breakthrough VT required ATPs lasted on 1/14/21.     2. ICD in situ  - Pacemaker implanted in March if 2014  - Indication high grade AV block. - Pacemake upgrated to ICD 09/23/20 due to sustained VT.     3. High grade AV block   - Status post pacemaker implantation in March of 2014      4. Hypertension  - Elevated. - On Cardizem, Zestril and Imdur.     5. Hydrocephalus     6. RBBB  - Chronic. Recommendations:    1. Will increase Mexiletine to 150 mg every 8 hours. 2. Continue Lopressor 100 mg bid. 3. Remote monitoring every 1 month. 4. Follow up in 3 months. Encouraged the patient to call the office for any questions or concerns. Thank you for allowing me to participate in your patient's care. I have spent a total of 40 minutes with the patient and the family reviewing the above stated recommendations. And a total of >50% of that time involved face-to-face time providing counseling and or coordination of care with the other providers.     Stefan Wakefield MD  Cardiac Electrophysiology  [facility]) Physicians  [facility]  11:00 AM  1/26/2021

## 2021-01-26 ENCOUNTER — OFFICE VISIT (OUTPATIENT)
Dept: NON INVASIVE DIAGNOSTICS | Age: 71
End: 2021-01-26
Payer: MEDICARE

## 2021-01-26 VITALS
DIASTOLIC BLOOD PRESSURE: 80 MMHG | HEART RATE: 60 BPM | WEIGHT: 218 LBS | BODY MASS INDEX: 30.52 KG/M2 | SYSTOLIC BLOOD PRESSURE: 142 MMHG | HEIGHT: 71 IN | TEMPERATURE: 97.5 F | RESPIRATION RATE: 18 BRPM

## 2021-01-26 DIAGNOSIS — Z45.02 ICD (IMPLANTABLE CARDIOVERTER-DEFIBRILLATOR) BATTERY DEPLETION: Primary | ICD-10-CM

## 2021-01-26 DIAGNOSIS — Z79.899 LONG TERM CURRENT USE OF AMIODARONE: ICD-10-CM

## 2021-01-26 DIAGNOSIS — I47.20 VENTRICULAR TACHYCARDIA: ICD-10-CM

## 2021-01-26 PROCEDURE — 93283 PRGRMG EVAL IMPLANTABLE DFB: CPT | Performed by: INTERNAL MEDICINE

## 2021-01-26 PROCEDURE — 99213 OFFICE O/P EST LOW 20 MIN: CPT | Performed by: INTERNAL MEDICINE

## 2021-01-26 RX ORDER — MEXILETINE HYDROCHLORIDE 150 MG/1
150 CAPSULE ORAL 3 TIMES DAILY
Qty: 270 CAPSULE | Refills: 2 | Status: SHIPPED
Start: 2021-01-26 | End: 2021-03-22 | Stop reason: DRUGHIGH

## 2021-02-03 ENCOUNTER — TELEPHONE (OUTPATIENT)
Dept: FAMILY MEDICINE CLINIC | Age: 71
End: 2021-02-03

## 2021-02-04 NOTE — TELEPHONE ENCOUNTER
Please reach out to [facility] and see if Mr linn has contacted them about a lift chair. I am trying to understand if he wants like a lift chair that will take him up some stairs, or a lift chair that is more like a lazy-boy and will lift him up out of a seated position. If M not sure/no contact, then please try to contact patient again so I can order the requested device.

## 2021-02-05 NOTE — TELEPHONE ENCOUNTER
Tried to reach the patient, but no voicemail option. [facility] does not carry lift chairs. No insurance will cover the chair. Medicare will cover the lifting mechanism in the chair, but the cost of the chair will have to be paid by the patient.

## 2021-02-05 NOTE — TELEPHONE ENCOUNTER
Pt states he wants the lazy boy type that will help him stand from a sitting position. I advised patient of what Michael Rodriguez reported. Pt will contact his insurance and let us know if it would be covered and if he would like to proceed with this. Awaiting patient's reply.

## 2021-02-08 ENCOUNTER — TELEPHONE (OUTPATIENT)
Dept: NON INVASIVE DIAGNOSTICS | Age: 71
End: 2021-02-08

## 2021-02-08 NOTE — TELEPHONE ENCOUNTER
Latitude alert for   VT treated with ATP     Reviewed latitude:  Real time AS/ @ 60   1 VT treated with ATP x1 successful ATP for VT on 2/7/2021 @0600    Mexiletine increased to 150mg TID on 1/25/2021  Lopressor 100mg BID. Attempted to call patient. No voice mail no answer. Forwarding to Dr. Chula Jimenes.        [place] [id number]
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
## 2021-02-12 NOTE — TELEPHONE ENCOUNTER
Calos Fernandez calling his insurance will cover this chair. carroll order to [facility].      He also asked we place his appt date and time in a letter and mail to him. Done.

## 2021-02-16 ENCOUNTER — FOLLOWUP TELEPHONE ENCOUNTER (OUTPATIENT)
Dept: FAMILY MEDICINE CLINIC | Age: 71
End: 2021-02-16

## 2021-02-18 ENCOUNTER — OFFICE VISIT (OUTPATIENT)
Dept: FAMILY MEDICINE CLINIC | Age: 71
End: 2021-02-18
Payer: MEDICARE

## 2021-02-18 VITALS
HEART RATE: 59 BPM | SYSTOLIC BLOOD PRESSURE: 140 MMHG | OXYGEN SATURATION: 95 % | BODY MASS INDEX: 30.8 KG/M2 | DIASTOLIC BLOOD PRESSURE: 92 MMHG | TEMPERATURE: 97.5 F | WEIGHT: 220 LBS | HEIGHT: 71 IN

## 2021-02-18 DIAGNOSIS — Z91.81 AT HIGH RISK FOR FALLS: ICD-10-CM

## 2021-02-18 DIAGNOSIS — Z89.412 HISTORY OF AMPUTATION OF LEFT GREAT TOE (HCC): ICD-10-CM

## 2021-02-18 DIAGNOSIS — R82.90 BAD ODOR OF URINE: Primary | ICD-10-CM

## 2021-02-18 DIAGNOSIS — M79.675 PAIN IN LEFT TOE(S): ICD-10-CM

## 2021-02-18 LAB
APPEARANCE FLUID: CLEAR
BILIRUBIN, POC: NORMAL
BLOOD URINE, POC: NORMAL
CLARITY, POC: CLEAR
COLOR, POC: NORMAL
GLUCOSE URINE, POC: NORMAL
KETONES, POC: NORMAL
LEUKOCYTE EST, POC: NORMAL
NITRITE, POC: NORMAL
PH, POC: 5.5
PROTEIN, POC: NORMAL
SPECIFIC GRAVITY, POC: >=1.03
UROBILINOGEN, POC: 0.2

## 2021-02-18 PROCEDURE — 90471 IMMUNIZATION ADMIN: CPT | Performed by: FAMILY MEDICINE

## 2021-02-18 PROCEDURE — 81002 URINALYSIS NONAUTO W/O SCOPE: CPT | Performed by: FAMILY MEDICINE

## 2021-02-18 PROCEDURE — 90750 HZV VACC RECOMBINANT IM: CPT | Performed by: FAMILY MEDICINE

## 2021-02-18 PROCEDURE — 99213 OFFICE O/P EST LOW 20 MIN: CPT | Performed by: FAMILY MEDICINE

## 2021-02-18 RX ORDER — HYDROCODONE BITARTRATE AND ACETAMINOPHEN 10; 325 MG/1; MG/1
TABLET ORAL
COMMUNITY
Start: 2021-02-12

## 2021-02-18 RX ORDER — TAMSULOSIN HYDROCHLORIDE 0.4 MG/1
0.4 CAPSULE ORAL DAILY
Qty: 30 CAPSULE | Refills: 5 | Status: SHIPPED
Start: 2021-02-18 | End: 2022-03-09

## 2021-02-18 RX ORDER — SULFAMETHOXAZOLE AND TRIMETHOPRIM 800; 160 MG/1; MG/1
1 TABLET ORAL 2 TIMES DAILY
Qty: 10 TABLET | Refills: 0 | Status: SHIPPED | OUTPATIENT
Start: 2021-02-18 | End: 2021-02-23

## 2021-02-18 NOTE — PROGRESS NOTES
[facility]  Office Progress Note - Dr. Judy Dunlap  2/18/21    CC:   Chief Complaint   Patient presents with    Toe Pain     Pain in toes on left foot onset 3-4 months    Other     Foul smelling urine onset 1 week. HPI: left foot toe pain  3-4 months  Hx of [facility] great [facility], and maybe tip of 2nd toe. Now has pain proximal to toe on foot pad, and at tip of 2nd toe with thick callus formation there. No ulcerations. Very dry flakey feet. Also has bene noting foul urine odor  Sample given today shows clean urine with trace intact blood suggested. 2 weeks or so  + urgency. No blood seen  Dark orange color tho compared to normal.   Has bene staying hydrated during the day regularly. Has had this before. Notes if dribbles, underwear or bed clothes will smell foul. Continues flomax and would like a refill.   _________________________________________________________    Assessment / Zainab List was seen today for toe pain and other. Diagnoses and all orders for this visit:    Bad odor of urine  -     POCT Urinalysis no Micro  -     sulfamethoxazole-trimethoprim (BACTRIM DS) 800-160 MG per tablet; Take 1 tablet by mouth 2 times daily for 5 days  Will treat for suspected mild uti, causing urine odor. Call for failure to improve with above Tx or worsening sooner. Pain in left toe(s)  -     Maya Veloz DPM, Podiatry, [place]    History of amputation of left great toe [facility])  -     [id number] Boss DPM, Podiatry, [place]    Hx of toe amputation(s) and now with progressing foot pain. 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 At high risk for falls    Other orders  -     tamsulosin (FLOMAX) 0.4 MG capsule; Take 1 capsule by mouth daily Night time urination help. -     Zoster recombinant [facility])  #2 today. Keep April appt. or as scheduled.    Patient counseled to follow up sooner or seek more acute care if symptoms worsening or not improving according to plan. Electronically signed by Ezequiel Stevenson MD on 2/18/2021    _________________________________________________________  Current Outpatient Medications on File Prior to Visit   Medication Sig Dispense Refill    HYDROcodone-acetaminophen (NORCO)  MG per tablet TAKE ONE TABLET FOUR TIMES DAILY AS NEEDED FOR PAIN      mexiletine (MEXITIL) 150 MG capsule Take 1 capsule by mouth 3 times daily 270 capsule 2    omeprazole (PRILOSEC) 20 MG delayed release capsule Take 20 mg by mouth as needed       magnesium oxide (MAG-OX) 400 (240 Mg) MG tablet TAKE ONE TABLET BY MOUTH TWICE DAILY      sertraline (ZOLOFT) 100 MG tablet Take 1 tablet by mouth daily For mood. 30 tablet 5    lisinopril (PRINIVIL;ZESTRIL) 20 MG tablet Take 1 tablet by mouth daily For blood pressure. 90 tablet 1    gabapentin (NEURONTIN) 300 MG capsule Take 1 capsule by mouth 3 times daily for 90 days. 270 capsule 0    busPIRone (BUSPAR) 10 MG tablet Take 1 tablet by mouth 3 times daily 90 tablet 2    isosorbide mononitrate (IMDUR) 120 MG extended release tablet TAKE ONE TABLET BY MOUTH EVERY DAY      metoprolol tartrate (LOPRESSOR) 100 MG tablet Take 1 tablet by mouth 2 times daily (Patient taking differently: Take 100 mg by mouth 2 times daily To take with Lopressor 25mg BID.) 60 tablet 3    cetirizine (ZYRTEC) 10 MG tablet Take 1 tablet by mouth daily 30 tablet 1    fluticasone (FLONASE ALLERGY RELIEF) 50 MCG/ACT nasal spray 1 spray by Each Nostril route daily 1 Bottle 1    aspirin (ASPIRIN 81) 81 MG chewable tablet Take by mouth daily        No current facility-administered medications on file prior to visit.         Patient Active Problem List   Diagnosis Code    PTSD (post-traumatic stress disorder) F43.10    Depression F32.9    Pacemaker Z95.0    HTN (hypertension) I10    Spinal stenosis at L4-L5 level M48.061    Depression, major, recurrent, moderate (HCC) F33.1    Ventricular tachycardia (HCC) I47.2    ICD (implantable cardioverter-defibrillator) battery depletion Z45.02     _________________________________________________________  Past Medical History:   Diagnosis Date    Cerebral artery occlusion with cerebral infarction ([facility] [address]     Chronic back pain     Depression     Hyperlipidemia     Hypertension     Pacemaker     Sleep apnea     TIA (transient ischemic attack)     Ventricular tachycardia ([facility] [address]        Family History   Problem Relation Age of Onset    High Blood Pressure Father     Diabetes Sister     Heart Disease Brother     Diabetes Sister     Diabetes Sister        Past Surgical History:   Procedure Laterality Date    BACK SURGERY      CARDIAC CATHETERIZATION  09/15/2020    Dr. Pa Bellamy      UPGRADE PPM TO DUAL ICD    ([employer])   DR. Regulo Bird     CARDIAC PACEMAKER PLACEMENT      CHOLECYSTECTOMY      EYE SURGERY Left     FOOT SURGERY      VENTRICULOPERITONEAL SHUNT         Social History     Tobacco Use    Smoking status: Former Smoker     Packs/day: 1.00     Years: 1.00     Pack years: 1.00     Types: Cigarettes     Quit date:      Years since quittin.1    Smokeless tobacco: Never Used    Tobacco comment: quit 30 years   Substance Use Topics    Alcohol use: Yes     Comment: rare    Drug use: No       Chart reviewed and updated where appropriate for PMH, Fam, and Soc Hx.  _________________________________________________________  ROS: POSITIVE: As in the HPI.    Otherwise Pertinent negatives are negative.    __________________________________________________________  Physical Exam   BP (!) 140/92 (Site: Left Upper Arm, Position: Sitting, Cuff Size: Large Adult)   Pulse 59   Temp 97.5 °F (36.4 °C) (Temporal)   Ht 5' 11\" (1.803 m)   Wt 220 lb (99.8 kg)   SpO2 95%   BMI 30.68 kg/m²   Wt Readings from Last 3 Encounters:   21 220 lb (99.8 kg)   01/26/21 218 lb (98.9 kg)   01/05/21 219 lb (99.3 kg)       Constitutional:    He is oriented to person, place, and time. He appears well-developed and well-nourished. Abdominal:    Soft. Bowel sounds are normal.    No distension. No tenderness. Musculoskeletal:    Normal range of motion. ttp at tip of 2nd toe on right foot with thick callus formation. Great toe amputation on left, well healed with no ulcers. Flaky feet, dry. ttp metatarsal heads 2-4. No joint swelling noted. No peripheral edema. Neurological:    He is A&Ox3. Motor and sensation grossly intact. anatlgic Gait. Skin:    Skin is warm and dry. No rashes, lesions. Psychiatric:    He has a normal mood and affect. Notes dreams with zoloft that are unpleasant - he will call with psychiatric provider. Normal groom and dress. No SI or HI.   ________________________________________________________    This note may have been created using dictation software. Efforts were made to reduce grammatical or syntax errors, but some may persist.   On the basis of positive falls risk screening, assessment and plan is as follows: patient declines any further evaluation/treatment for increased falls risk.

## 2021-03-08 ENCOUNTER — TELEPHONE (OUTPATIENT)
Dept: NON INVASIVE DIAGNOSTICS | Age: 71
End: 2021-03-08

## 2021-03-08 NOTE — TELEPHONE ENCOUNTER
Latitude alert for ATP x 2, both successful and appropriate. See scan. Phone call to patient to discuss symptoms and medication compliance. Phone call to patient, no answer, unable to leave v/m, not set up.     Vanessa Bonilla RN, BSN  [place]

## 2021-03-22 RX ORDER — MEXILETINE HYDROCHLORIDE 200 MG/1
200 CAPSULE ORAL 3 TIMES DAILY
Qty: 90 CAPSULE | Refills: 11 | Status: SHIPPED
Start: 2021-03-22 | End: 2021-05-10 | Stop reason: DRUGHIGH

## 2021-03-22 RX ORDER — MEXILETINE HYDROCHLORIDE 200 MG/1
200 CAPSULE ORAL 3 TIMES DAILY
COMMUNITY
End: 2021-03-22 | Stop reason: SDUPTHER

## 2021-03-22 NOTE — TELEPHONE ENCOUNTER
Called and spoke with patient regarding Dr. Jose E Herrera recommendation. Patient agrees to increase dose of Mexiletine. He would like it called into [facility] in [place].        Sharmaine Kelley

## 2021-03-22 NOTE — TELEPHONE ENCOUNTER
Latitude alert for ATP. Reviewed latitude:   1 VT treated with ATP x1 successful ATP for VT. Real time AS/ @ 62    See previous notes: previous ATP on 3.7.2021 and 2.7.2021. On Mexiltil 150mg TID, Lopressor 100mg BID. Next appointment with Dr. Odin Conley 4.20.2021       Forwarding to Dr. Odin Conley.        [place] [id number]

## 2021-03-24 ENCOUNTER — NURSE ONLY (OUTPATIENT)
Dept: NON INVASIVE DIAGNOSTICS | Age: 71
End: 2021-03-24
Payer: MEDICARE

## 2021-03-24 DIAGNOSIS — Z45.02 ICD (IMPLANTABLE CARDIOVERTER-DEFIBRILLATOR) BATTERY DEPLETION: Primary | ICD-10-CM

## 2021-03-24 DIAGNOSIS — I50.22 CHRONIC SYSTOLIC CONGESTIVE HEART FAILURE (HCC): ICD-10-CM

## 2021-03-24 DIAGNOSIS — I47.20 VENTRICULAR TACHYCARDIA: ICD-10-CM

## 2021-03-26 RX ORDER — GABAPENTIN 300 MG/1
300 CAPSULE ORAL 3 TIMES DAILY
Qty: 270 CAPSULE | Refills: 0 | Status: SHIPPED
Start: 2021-03-26 | End: 2021-06-17 | Stop reason: SDUPTHER

## 2021-03-26 NOTE — TELEPHONE ENCOUNTER
Name of Medication(s) Requested:  Gabapentin    Pharmacy Requested:   [facility]    Medication(s) pended? [x] Yes  [] No    Last Appointment:  2/18/2021    Future appts:  Future Appointments   Date Time Provider Torey Scanlon   4/5/2021  2:20 PM Everett Corral MD [facility]   4/20/2021 10:30 AM Casey Juárez MD [facility]   6/23/2021  9:30 AM SCHEDULE, REMOTE CHECK [facility]        Does patient need call back?   [] Yes  [x] No

## 2021-04-12 PROCEDURE — 93296 REM INTERROG EVL PM/IDS: CPT | Performed by: INTERNAL MEDICINE

## 2021-04-12 PROCEDURE — 93295 DEV INTERROG REMOTE 1/2/MLT: CPT | Performed by: INTERNAL MEDICINE

## 2021-04-12 NOTE — PROGRESS NOTES
See [facility] [place] report. Remote monitoring reviewed over a 90 day period. End of 90 day monitoring period date of service 3-. Remote tx from 4- also reviewed (1 ATP).      Sarah Loera RN, BSN  [place]

## 2021-04-13 ENCOUNTER — TELEPHONE (OUTPATIENT)
Dept: NON INVASIVE DIAGNOSTICS | Age: 71
End: 2021-04-13

## 2021-04-13 DIAGNOSIS — Z79.899 LONG TERM CURRENT USE OF AMIODARONE: Primary | ICD-10-CM

## 2021-04-13 NOTE — TELEPHONE ENCOUNTER
----- Message from Tucker Lewis MD sent at 4/13/2021  2:27 PM EDT -----  Please check CMP, TSH, Ft4 and magnesium. Start Amiodarone 200 mg tid for 1 week then 200 mg bid for 1 week then 200 mg daily. Thanks.  ----- Message -----  From: Shanna Flood RN  Sent: 4/13/2021   1:37 PM EDT  To: Tucker Lewis MD    Please review ATP and 1 shock.

## 2021-04-13 NOTE — TELEPHONE ENCOUNTER
Remote latitude received: 3 ATP and one 41 joule shock on 4-. Successful and appropriate for VT/VF. Will review with Dr. RALEIGH SANCHEZ. .     Phone call from patient who states he got 1 shock from device while doing some raking at his bosses' house. States he feels ok now. Reports good medication compliance, on Mexilitine, Lopressor. Will have his wife pick him up and take him home to send in a latitude tx. Reviewed our shock protocol, instructed to go to [facility] ER with multiple shocks. He verbalized understanding.      Chiquita Up RN, BSN  [facility]

## 2021-04-13 NOTE — TELEPHONE ENCOUNTER
Phone call to patient to discuss Dr. Joel Oden advice after discussion in the office: plan on trying Amiodarone again or elective sotalol admission. Unable to leave message/no answer. Will try again to reach pt. Later spoke with patient (lengthy discussion) who states he does not want to go into the hospital at this time and prefers to try the Amiodarone again. Instructed to watch for rash/hives and notify our office if that occurs. He verbalized understanding. He will go to [facility] tomorrow for labs and uses [facility] in [place] for his pharmacy.        (Of note, Amiodarone was started on 10- and stopped on 12- due to rash/hives on arms and legs per chart review.)    Sarah Loera RN, [address] [facility]

## 2021-04-14 ENCOUNTER — HOSPITAL ENCOUNTER (OUTPATIENT)
Age: 71
Discharge: HOME | End: 2021-04-14
Payer: MEDICARE

## 2021-04-14 DIAGNOSIS — Z79.899 LONG TERM CURRENT USE OF AMIODARONE: ICD-10-CM

## 2021-04-14 LAB
ALBUMIN SERPL-MCNC: 4 G/DL (ref 3.5–5.2)
ALP BLD-CCNC: 54 U/L (ref 40–129)
ALT SERPL-CCNC: 21 U/L (ref 0–40)
ANION GAP SERPL CALCULATED.3IONS-SCNC: 11 MMOL/L (ref 7–16)
AST SERPL-CCNC: 18 U/L (ref 0–39)
BILIRUB SERPL-MCNC: 0.3 MG/DL (ref 0–1.2)
BUN BLDV-MCNC: 28 MG/DL (ref 8–23)
CALCIUM SERPL-MCNC: 9.6 MG/DL (ref 8.6–10.2)
CHLORIDE BLD-SCNC: 100 MMOL/L (ref 98–107)
CO2: 25 MMOL/L (ref 22–29)
CREAT SERPL-MCNC: 1.4 MG/DL (ref 0.7–1.2)
GFR AFRICAN AMERICAN: >60
GFR NON-AFRICAN AMERICAN: >60 ML/MIN/1.73
GLUCOSE BLD-MCNC: 97 MG/DL (ref 74–99)
MAGNESIUM: 1.9 MG/DL (ref 1.6–2.6)
POTASSIUM SERPL-SCNC: 4.2 MMOL/L (ref 3.5–5)
SODIUM BLD-SCNC: 136 MMOL/L (ref 132–146)
T4 FREE: 0.92 NG/DL (ref 0.93–1.7)
TOTAL PROTEIN: 7.4 G/DL (ref 6.4–8.3)
TSH SERPL DL<=0.05 MIU/L-ACNC: 1.2 UIU/ML (ref 0.27–4.2)

## 2021-04-14 PROCEDURE — 83735 ASSAY OF MAGNESIUM: CPT

## 2021-04-14 PROCEDURE — 84443 ASSAY THYROID STIM HORMONE: CPT

## 2021-04-14 PROCEDURE — 80053 COMPREHEN METABOLIC PANEL: CPT

## 2021-04-14 PROCEDURE — 36415 COLL VENOUS BLD VENIPUNCTURE: CPT

## 2021-04-14 PROCEDURE — 84439 ASSAY OF FREE THYROXINE: CPT

## 2021-04-14 RX ORDER — AMIODARONE HYDROCHLORIDE 200 MG/1
TABLET ORAL
Qty: 125 TABLET | Refills: 0 | Status: SHIPPED
Start: 2021-04-14 | End: 2021-05-10 | Stop reason: SINTOL

## 2021-05-05 ENCOUNTER — TELEPHONE (OUTPATIENT)
Dept: ADMINISTRATIVE | Age: 71
End: 2021-05-05

## 2021-05-05 ENCOUNTER — TELEPHONE (OUTPATIENT)
Dept: FAMILY MEDICINE CLINIC | Age: 71
End: 2021-05-05

## 2021-05-05 NOTE — TELEPHONE ENCOUNTER
Pt called today to say that his ears are ringing, headache and his eyes are sore. I advised pt to be seen in urgent care today and pt was in agreement.

## 2021-05-05 NOTE — TELEPHONE ENCOUNTER
Pt called to speak to office staff, he has a headache and wanted Dr Selina Carrasco advice, pt was leaving message with clinical line

## 2021-05-07 ENCOUNTER — TELEPHONE (OUTPATIENT)
Dept: NON INVASIVE DIAGNOSTICS | Age: 71
End: 2021-05-07

## 2021-05-07 NOTE — TELEPHONE ENCOUNTER
----- Message from Sharri Carey MD sent at 5/7/2021  2:57 PM EDT -----  Stop it for now and increase Mexiletine to 250 mg tid. Thanks.  ----- Message -----  From: Mary Pan  Sent: 5/7/2021   1:58 PM EDT  To: Sharri Carey MD    Patient called in stating he is having an allergic reaction from Amiodarone medication, he is getting hives on and off. Please advise, thank you.

## 2021-05-10 RX ORDER — MEXILETINE HYDROCHLORIDE 250 MG/1
250 CAPSULE ORAL 3 TIMES DAILY
COMMUNITY
End: 2021-05-10 | Stop reason: SDUPTHER

## 2021-05-10 RX ORDER — MEXILETINE HYDROCHLORIDE 250 MG/1
250 CAPSULE ORAL 3 TIMES DAILY
Qty: 90 CAPSULE | Refills: 11 | Status: SHIPPED
Start: 2021-05-10 | End: 2021-05-18 | Stop reason: SDUPTHER

## 2021-05-10 NOTE — TELEPHONE ENCOUNTER
Spoke with patient let him know patient should stop Amiodarone and increase Mexiletine 250 mg TID. Patient verbalized understanding.

## 2021-05-10 NOTE — PROGRESS NOTES
by mouth daily Night time urination help. 30 capsule 5    omeprazole (PRILOSEC) 20 MG delayed release capsule Take 20 mg by mouth as needed       magnesium oxide (MAG-OX) 400 (240 Mg) MG tablet TAKE ONE TABLET BY MOUTH TWICE DAILY      lisinopril (PRINIVIL;ZESTRIL) 20 MG tablet Take 1 tablet by mouth daily For blood pressure. 90 tablet 1    busPIRone (BUSPAR) 10 MG tablet Take 1 tablet by mouth 3 times daily 90 tablet 2    isosorbide mononitrate (IMDUR) 120 MG extended release tablet TAKE ONE TABLET BY MOUTH EVERY DAY      metoprolol tartrate (LOPRESSOR) 100 MG tablet Take 1 tablet by mouth 2 times daily 60 tablet 3    cetirizine (ZYRTEC) 10 MG tablet Take 1 tablet by mouth daily 30 tablet 1    fluticasone (FLONASE ALLERGY RELIEF) 50 MCG/ACT nasal spray 1 spray by Each Nostril route daily 1 Bottle 1    aspirin (ASPIRIN 81) 81 MG chewable tablet Take by mouth daily        No current facility-administered medications for this visit. Allergies   Allergen Reactions    Bee Venom     Naproxen     Meloxicam Nausea And Vomiting     ROS:   Constitutional: Negative for fever, activity change and appetite change. HENT: Negative for epistaxis. Eyes: Negative for diploplia, blurred vision. Respiratory: Negative for cough. Positive for chest tightness and hortness of breath. Negative of wheezing. Cardiovascular: pertinent positives in HPI  Gastrointestinal: Negative for abdominal pain and blood in stool. All other review of systems are negative     PHYSICAL EXAM:  Vitals:    05/11/21 1103   BP: (!) 168/110   Pulse: 60   Resp: 18   Weight: 225 lb (102.1 kg)   Height: 6' 2\" (1.88 m)     Constitutional: Well-developed, no acute distress  Eyes: conjunctivae normal, no xanthelasma   Ears, Nose, Throat: oral mucosa moist, no cyanosis   CV: no JVD. Regular rate and rhythm. Normal S1S2 and no S3. No murmurs, rubs, or gallops.  PMI is nondisplaced  Lungs: clear to auscultation bilaterally, normal respiratory ischemia.  - C 09/15/20 required no intervention   - Status post ICD upgrade 9/23/20  - Started on Amiodarone 10/26/20 due to recurrent VT required ATPs and ICD shocks and discontinued 12/23/20 due to rash. - Started on Mexiletine 12/23/20.  - Still having breakthrough VT required ATPs lasted on 4/13/21.  - Amiodarone restarted 4/14/21 and stopped 5/7/21 due to hives  - Mexiletine dose increase  to 250 mg tid on 5/7/21.  - On Lopressor.  - No recurrence on today interrogation.      2. ICD in situ  - Pacemaker implanted in March if 2014  - Indication high grade AV block. - Pacemake upgrated to ICD 09/23/20 due to sustained VT.  - Normal device function.     3. High grade AV block   - Status post pacemaker implantation in March of 2014      4. Hypertension  - Elevated. - On Lopressor, Zestril and Imdur.     5. Hydrocephalus     6. RBBB  - Chronic. 7. BPH  - On Flomax. Recommendations:    1. Continue Mexiletine 250 mg every 8 hours. 2. Continue Lopressor 100 mg bid. 3. Consider add on Tikosyn or Sotalol if VT recurs. 4. Remote monitoring every 1 month. 5. Follow up in 3 months. Encouraged the patient to call the office for any questions or concerns. Thank you for allowing me to participate in your patient's care. I have spent a total of 40 minutes with the patient and the family reviewing the above stated recommendations. And a total of >50% of that time involved face-to-face time providing counseling and or coordination of care with the other providers, preparation for the clinic visit, reviewing records/tests, counseling/education of the patient, ordering medications/tests/procedures, coordinating care, and documenting clinical information in the EHR.      Jose Miguel Craig MD  Cardiac Electrophysiology  [address]  [facility]  11:18 AM  5/11/2021

## 2021-05-11 ENCOUNTER — OFFICE VISIT (OUTPATIENT)
Dept: NON INVASIVE DIAGNOSTICS | Age: 71
End: 2021-05-11
Payer: MEDICARE

## 2021-05-11 VITALS
HEART RATE: 60 BPM | BODY MASS INDEX: 28.88 KG/M2 | WEIGHT: 225 LBS | DIASTOLIC BLOOD PRESSURE: 110 MMHG | RESPIRATION RATE: 18 BRPM | HEIGHT: 74 IN | SYSTOLIC BLOOD PRESSURE: 168 MMHG

## 2021-05-11 DIAGNOSIS — Z95.810 ICD (IMPLANTABLE CARDIOVERTER-DEFIBRILLATOR), DUAL, IN SITU: ICD-10-CM

## 2021-05-11 DIAGNOSIS — Z45.02 ICD (IMPLANTABLE CARDIOVERTER-DEFIBRILLATOR) BATTERY DEPLETION: ICD-10-CM

## 2021-05-11 DIAGNOSIS — I47.20 VENTRICULAR TACHYCARDIA: Primary | ICD-10-CM

## 2021-05-11 DIAGNOSIS — I50.22 CHRONIC SYSTOLIC CONGESTIVE HEART FAILURE (HCC): ICD-10-CM

## 2021-05-11 PROCEDURE — 93283 PRGRMG EVAL IMPLANTABLE DFB: CPT | Performed by: INTERNAL MEDICINE

## 2021-05-11 PROCEDURE — 99215 OFFICE O/P EST HI 40 MIN: CPT | Performed by: INTERNAL MEDICINE

## 2021-05-11 SDOH — HEALTH STABILITY: MENTAL HEALTH: HOW MANY STANDARD DRINKS CONTAINING ALCOHOL DO YOU HAVE ON A TYPICAL DAY?: NOT ASKED

## 2021-05-11 SDOH — HEALTH STABILITY: MENTAL HEALTH: HOW OFTEN DO YOU HAVE A DRINK CONTAINING ALCOHOL?: NOT ASKED

## 2021-05-18 ENCOUNTER — TELEPHONE (OUTPATIENT)
Dept: NON INVASIVE DIAGNOSTICS | Age: 71
End: 2021-05-18

## 2021-05-18 RX ORDER — MEXILETINE HYDROCHLORIDE 250 MG/1
250 CAPSULE ORAL 3 TIMES DAILY
Qty: 90 CAPSULE | Refills: 11 | Status: SHIPPED
Start: 2021-05-18 | End: 2021-08-25 | Stop reason: SDUPTHER

## 2021-05-18 NOTE — TELEPHONE ENCOUNTER
Patient stopped into the office. He is almost out of Mexilitine only has enough for today. [facility] cannot get the 250mg tablets and they are not sure when they can get it their distributor does not carry it. Please advise.

## 2021-05-18 NOTE — TELEPHONE ENCOUNTER
From: Lynette Perkins  To: FANI Palomo  Sent: 5/3/2019 11:59 AM CDT  Subject: Referral Request    Sure, can I be referred to dermatology.   The wife called back and they want the script called into [facility].

## 2021-06-11 ENCOUNTER — TELEPHONE (OUTPATIENT)
Dept: NON INVASIVE DIAGNOSTICS | Age: 71
End: 2021-06-11

## 2021-06-11 NOTE — TELEPHONE ENCOUNTER
----- Message from Genaro Taylor MD sent at 6/7/2021  7:04 AM EDT -----  Please schedule him for Sotalol admission of he agrees. Thanks.  ----- Message -----  From: Jyothi Mistry RN  Sent: 6/7/2021   6:26 AM EDT  To: Genaro Taylor MD    Successful and appropriate ATP x1.  FYI

## 2021-06-15 ENCOUNTER — TELEPHONE (OUTPATIENT)
Dept: NON INVASIVE DIAGNOSTICS | Age: 71
End: 2021-06-15

## 2021-06-15 NOTE — TELEPHONE ENCOUNTER
----- Message from Howard Forde MD sent at 6/7/2021  7:04 AM EDT -----  Please schedule him for Sotalol admission of he agrees. Thanks.  ----- Message -----  From: Jannet Scheuermann, RN  Sent: 6/7/2021   6:26 AM EDT  To: Howard Forde MD    Successful and appropriate ATP x1.  FYI BP Readings from Last 2 Encounters:   04/16/19 (!) 92/54   04/08/19 (!) 91/54     Hemoglobin A1C (%)   Date Value   04/05/2019 7.5     LDL Calculated (mg/dL)   Date Value   04/05/2019 42              Tobacco use:  Patient  reports that she quit smoking about 3 years ago. Her smoking use included cigarettes. She has a 12.75 pack-year smoking history. She has never used smokeless tobacco.    If Smoker - Cessation materials given? NA    Is Daily aspirin on medication list?:  No    Diabetic retinal exam done? Yes   If yes, document in Health Maintenance. Monofilament placed on counter? Yes    Shoes and socks removed? Yes    BP taken with correct size cuff? Yes   Repeated if > 130/80 NA     Microalbumin performed if applicable? Yes     [facility]  Progress Note  Alexys Vallecillo DO          Dilma Corea  [birth year]    04/16/19    Chief Complaint:   Dilma Corea is a 69 y.o. female who is here for BP control. And DM    HPI:   No hypoglycemia. BP eval: Patient here for follow-up blood pressure. She is exercising and is adherent to low salt diet. Blood pressure is well controlled at home. Cardiac symptoms none. Patient denies chest pain, chest pressure/discomfort, claudication and dyspnea. Cardiovascular risk factors: advanced age (older than 54 for men, 72 for women). Use of agents associated with hypertension: none. History of target organ damage: none. Taking 1/2 tab of the lisinopril 30 mg tabs. Occasionally fees light headed when sitting to standing. Complains of right lateral hip pain. Trainer  Has encouraged  Hair Velarde to use soft tissue massage which helps at times. Ibuprofen helps as well. ROS negative for headache, visionchanges, chest pain, shortness of breath, abdominal pain, urinary sx, bowel changes. Past medical, surgical, and social history reviewed. Medications and allergies reviewed.     Allergies   Allergen Reactions    Benadryl [Diphenhydramine]     Erythromycin Nausea And Vomiting     Prior to Visit Medications    Medication Sig Taking? Authorizing Provider   Biotin 20262 MCG TABS Take by mouth Yes Historical Provider, MD   timolol (TIMOPTIC) 0.25 % ophthalmic solution 1 drop 2 times daily Yes Historical Provider, MD   bimatoprost (LUMIGAN) 0.01 % SOLN ophthalmic drops Place 1 drop into both eyes nightly Yes Historical Provider, MD   lisinopril (PRINIVIL;ZESTRIL) 10 MG tablet Take 1 tablet by mouth daily Yes Amanda Soler, DO   diclofenac sodium 1 % GEL Apply 4 g topically 4 times daily Yes Amanda Soler, DO   empagliflozin (JARDIANCE) 25 MG tablet TAKE ONE TABLET BY MOUTH DAILY Yes Amanda Soler, DO   VICTOZA 18 MG/3ML SOPN SC injection INJECT (1.8MG) SUBCUTANEOUSLY DAILY Yes Amanda Soler, DO   SURE COMFORT PEN NEEDLES 31G X 5 MM MISC USE WITH LANTUS SOLOSTAR PENS Yes Socrates Russo MD   metFORMIN (GLUCOPHAGE-XR) 500 MG extended release tablet TAKE ONE TABLET BY MOUTH TWICE DAILY (TAKE WITH FOOD) Yes Amanda Soler DO   fenofibrate 160 MG tablet TAKE ONE TABLET BY MOUTH DAILY Yes Amanda Soler,    rosuvastatin (CRESTOR) 40 MG tablet TAKE ONE TABLET BY MOUTH EACH EVENING Yes Husam Bautistaugh, DO   insulin glargine (BASAGLAR KWIKPEN) 100 UNIT/ML injection pen 42 units daily Yes Socrates Russo MD   Blood Glucose Monitoring Suppl MARIA ELENA Use to check sugar daily MAY SUBSTITUTE Yes Terrance Barnard MD   Glucose Blood (BLOOD GLUCOSE TEST STRIPS) STRP Use to check sugar  Once daily MAY SUBSTITUTE PER INSURANCE Yes Terrance Barnard MD   Lancets MISC Use to check sugar once daily MAY SUBSTITUTE PER INSURANCE Yes Terrance Barnard MD   Insulin Pen Needle 32G X 4 MM MISC 1 each by Does not apply route daily Yes Socrates Russo MD   melatonin (RA MELATONIN) 3 MG TABS tablet Take 3 mg by mouth daily  Yes MECHE Busch CNP   Cholecalciferol (VITAMIN D3) 400 UNIT/ML LIQD Take 5,000 Int'l Units by mouth daily.  Yes Historical Provider, MD          Vitals:    04/16/19 1221 04/16/19 1312   BP: 89/69 (!) 92/54   Pulse: 71    Resp: 12    Temp: 96 °F (35.6 °C)    TempSrc: Oral    SpO2: 95%    Weight: 138 lb 6.4 oz (62.8 kg)       Wt Readings from Last 3 Encounters:   04/16/19 138 lb 6.4 oz (62.8 kg)   04/08/19 136 lb 6.4 oz (61.9 kg)   01/08/19 139 lb 9.6 oz (63.3 kg)     BP Readings from Last 3 Encounters:   04/16/19 (!) 92/54   04/08/19 (!) 91/54   01/08/19 (!) 95/55       Patient Active Problem List   Diagnosis    Rotator cuff tear    Telangiectasia    Plantar fasciitis    Cervicalgia    Keratosis    Degenerative joint disease    Disc disorder of cervical region    Carpal tunnel syndrome    Peripheral neuropathy    Vitamin D deficiency    Pyriformis syndrome    Essential hypertension    Uncontrolled type 2 diabetes mellitus without complication, without long-term current use of insulin ([address]    Primary insomnia    Mixed hyperlipidemia    Hypotestosteronemia    Alopecia    Controlled type 2 diabetes mellitus with microalbuminuria, with long-term current use of insulin (HCC)    Basal cell carcinoma of nose       Immunization History   Administered Date(s) Administered    Influenza Virus Vaccine 10/05/2010, 10/11/2011, 10/16/2012, 10/14/2013, 09/29/2015    Influenza Whole 02/08/2010    Influenza, High Dose (Fluzone 65 yrs and older) 10/26/2007, 11/04/2008, 09/08/2009, 02/08/2010, 10/03/2016, 01/12/2018, 09/12/2018    Pneumococcal 13-valent Conjugate ([id number]) 03/12/2015    Pneumococcal Polysaccharide ([id number]) 10/26/2007, 01/15/2013    Tdap (Boostrix, Adacel) 01/27/2017    Zoster Live (Zostavax) 10/06/2009    Zoster Subunit (Shingrix) 05/12/2018, 08/01/2018       Past Medical History:   Diagnosis Date    Anesthesia     hx of reaction to medications can have \"opposite\" effect with sedatives and causes patient to be jittery, agitated. Patient has concern what will be given.      Arthritis     Diabetic eye exam ([facility] 08.24.15    Dr Katherin Quevedo Diabetic eye exam [facility]) 01/11/2017    Diabetic eye exam ([facility] 01/09/2019    Hyperlipidemia     Hypertension     Pneumonia 1996    Type II or unspecified type diabetes mellitus without mention of complication, not stated as uncontrolled      Past Surgical History:   Procedure Laterality Date    CARPAL TUNNEL RELEASE      bilateral    COLONOSCOPY  5/2013    HYSTERECTOMY      partial    MASTECTOMY, PARTIAL Left 07/18/2013    LEFT BREAST NEEDLE LOCALIZED PARTIAL MASTECTOMY EXCISIONAL    OTHER SURGICAL HISTORY      medications for sedation can make her jittery    TUBAL LIGATION       Family History   Problem Relation Age of Onset    High Blood Pressure Mother     High Cholesterol Mother     Depression Mother     High Blood Pressure Brother     High Cholesterol Brother     Diabetes Maternal Grandmother      Social History     Socioeconomic History    Marital status:      Spouse name: Not on file    Number of children: Not on file    Years of education: Not on file    Highest education level: Not on file   Occupational History    Not on file   Social Needs    Financial resource strain: Not on file    Food insecurity:     Worry: Not on file     Inability: Not on file    Transportation needs:     Medical: Not on file     Non-medical: Not on file   Tobacco Use    Smoking status: Former Smoker     Packs/day: 0.25     Years: 51.00     Pack years: 12.75     Types: Cigarettes     Last attempt to quit: 3/9/2016     Years since quitting: 3.1    Smokeless tobacco: Never Used    Tobacco comment: intermittent smoking over last 51 yrs. Prema Robledo has a cigarette now. Quit routine smoking 1996   Substance and Sexual Activity    Alcohol use: Yes     Alcohol/week: 0.0 oz     Comment: Occas.  x 2-3/ month    Drug use: No    Sexual activity: Not on file   Lifestyle    Physical activity:     Days per week: Not on file     Minutes per session: Not on file    Stress: Not on file   Relationships    Social connections:     Talks on phone: Not on file     Gets together: Not on file     Attends Hoahaoism service: Not on file     Active member of club or organization: Not on file     Attends meetings of clubs or organizations: Not on file     Relationship status: Not on file    Intimate partner violence:     Fear of current or ex partner: Not on file     Emotionally abused: Not on file     Physically abused: Not on file     Forced sexual activity: Not on file   Other Topics Concern    Not on file   Social History Narrative    Not on file       O: BP (!) 92/54   Pulse 71   Temp 96 °F (35.6 °C) (Oral)   Resp 12   Wt 138 lb 6.4 oz (62.8 kg)   LMP  (LMP Unknown)   SpO2 95%   Breastfeeding? No   BMI 23.76 kg/m²   Physical Exam  GEN: No acute distress,cooperative, well nourished, alert. HEENT: PEERLA, EOMI , normocephalic/atraumatic, nares and oropharynx clear. Mucus membranes normal, Tympanic membranes clear bilaterally. Neck: soft, supple, no thyromegaly,mass, no Lymphadenopathy  CV: Regular rate and rhythm, no murmur, rubs, gallops. No edema. Resp: Clear to auscultation bilaterally good air entry bilaterally  No crackles, wheeze. Breathing comfortably. Psych:normal affect. Neuro: AOx3  MSK: abducts hips without difficulty. Mild TTP of the tronchanteric bursa. ASSESSMENT   Diagnosis Orders   1. Trochanteric bursitis of right hip  diclofenac sodium 1 % GEL   2. Type 2 diabetes mellitus with hemoglobin A1c goal of 7.0%-8.0% ([facility])  POCT microalbumin     #1: The risks, benefits, potential side effects and barriers to medication use were addressed today. Understanding was acknowledged. Patient asked to follow-up if condition(s) do not improve as anticipated. Consider cortisone shot if not improved. #2: doing better. Can ease back on the lisinopril. PLAN          See rest of plan under patient instructions.     Return in about 6 months (around

## 2021-06-17 ENCOUNTER — OFFICE VISIT (OUTPATIENT)
Dept: FAMILY MEDICINE CLINIC | Age: 71
End: 2021-06-17
Payer: MEDICARE

## 2021-06-17 VITALS
TEMPERATURE: 98.3 F | SYSTOLIC BLOOD PRESSURE: 158 MMHG | BODY MASS INDEX: 31.22 KG/M2 | OXYGEN SATURATION: 94 % | WEIGHT: 223 LBS | DIASTOLIC BLOOD PRESSURE: 96 MMHG | HEART RATE: 68 BPM | HEIGHT: 71 IN

## 2021-06-17 DIAGNOSIS — I10 ESSENTIAL HYPERTENSION: ICD-10-CM

## 2021-06-17 DIAGNOSIS — F41.9 ANXIETY: ICD-10-CM

## 2021-06-17 DIAGNOSIS — K04.7 TOOTH INFECTION: Primary | ICD-10-CM

## 2021-06-17 PROCEDURE — 99214 OFFICE O/P EST MOD 30 MIN: CPT | Performed by: FAMILY MEDICINE

## 2021-06-17 RX ORDER — LISINOPRIL 30 MG/1
30 TABLET ORAL DAILY
Qty: 90 TABLET | Refills: 1 | Status: SHIPPED
Start: 2021-06-17 | End: 2022-04-30 | Stop reason: SDUPTHER

## 2021-06-17 RX ORDER — OMEPRAZOLE 20 MG/1
20 CAPSULE, DELAYED RELEASE ORAL DAILY
Qty: 30 CAPSULE | Refills: 2 | Status: SHIPPED | OUTPATIENT
Start: 2021-06-17

## 2021-06-17 RX ORDER — ESCITALOPRAM OXALATE 10 MG/1
TABLET ORAL
COMMUNITY
Start: 2021-05-06

## 2021-06-17 RX ORDER — BUSPIRONE HYDROCHLORIDE 10 MG/1
10 TABLET ORAL 3 TIMES DAILY
Qty: 90 TABLET | Refills: 2 | Status: SHIPPED | OUTPATIENT
Start: 2021-06-17

## 2021-06-17 RX ORDER — FLUTICASONE PROPIONATE 50 MCG
1 SPRAY, SUSPENSION (ML) NASAL DAILY
Qty: 1 BOTTLE | Refills: 1 | Status: SHIPPED | OUTPATIENT
Start: 2021-06-17

## 2021-06-17 RX ORDER — AMOXICILLIN 500 MG/1
500 CAPSULE ORAL 3 TIMES DAILY
Qty: 21 CAPSULE | Refills: 0 | Status: SHIPPED
Start: 2021-06-17 | End: 2021-07-26 | Stop reason: SDUPTHER

## 2021-06-17 RX ORDER — GABAPENTIN 300 MG/1
300 CAPSULE ORAL 3 TIMES DAILY
Qty: 270 CAPSULE | Refills: 0 | Status: SHIPPED
Start: 2021-06-17 | End: 2021-09-14 | Stop reason: SDUPTHER

## 2021-06-17 RX ORDER — LISINOPRIL 20 MG/1
20 TABLET ORAL DAILY
Qty: 90 TABLET | Refills: 1 | Status: SHIPPED
Start: 2021-06-17 | End: 2021-06-17

## 2021-06-17 SDOH — ECONOMIC STABILITY: FOOD INSECURITY: WITHIN THE PAST 12 MONTHS, YOU WORRIED THAT YOUR FOOD WOULD RUN OUT BEFORE YOU GOT MONEY TO BUY MORE.: OFTEN TRUE

## 2021-06-17 SDOH — ECONOMIC STABILITY: FOOD INSECURITY: WITHIN THE PAST 12 MONTHS, THE FOOD YOU BOUGHT JUST DIDN'T LAST AND YOU DIDN'T HAVE MONEY TO GET MORE.: SOMETIMES TRUE

## 2021-06-17 ASSESSMENT — SOCIAL DETERMINANTS OF HEALTH (SDOH): HOW HARD IS IT FOR YOU TO PAY FOR THE VERY BASICS LIKE FOOD, HOUSING, MEDICAL CARE, AND HEATING?: HARD

## 2021-06-17 NOTE — PROGRESS NOTES
tablet; Take 1 tablet by mouth 3 times daily  Stable recently and improved with escitalopram addition. He is also working with a psychiatrist.  Maryann Munoz nurse practitioner. We discussed sleep of that. Any sedative increases other problems. I advised him that if he used it regularly, some occasional Benadryl might help him fall asleep but I would not want him using this more than once weekly. Did discuss that this can cause him some problems too. Essential hypertension, not controlled  -Increase   lisinopril (PRINIVIL;ZESTRIL) 30 MG tablet; Take 1 tablet by mouth daily For blood pressure. Other orders -refills  -     gabapentin (NEURONTIN) 300 MG capsule; Take 1 capsule by mouth 3 times daily for 90 days. -     fluticasone (FLONASE ALLERGY RELIEF) 50 MCG/ACT nasal spray; 1 spray by Each Nostril route daily  -     omeprazole (PRILOSEC) 20 MG delayed release capsule; Take 1 capsule by mouth Daily    Return in about 3 months (around 9/17/2021). or as scheduled. Patient counseled to follow up sooner or seek more acute care if symptoms worsening or not improving according to plan. Electronically signed by Sera Smith MD on 6/17/2021    _________________________________________________________  Current Outpatient Medications on File Prior to Visit   Medication Sig Dispense Refill    escitalopram (LEXAPRO) 10 MG tablet TAKE ONE TABLET BY MOUTH IN THE EVENING WITH DINNER FOR DEPRESSION AND ANXIETY      mexiletine (MEXITIL) 250 MG capsule Take 1 capsule by mouth 3 times daily 90 capsule 11    HYDROcodone-acetaminophen (NORCO)  MG per tablet TAKE ONE TABLET FOUR TIMES DAILY AS NEEDED FOR PAIN      tamsulosin (FLOMAX) 0.4 MG capsule Take 1 capsule by mouth daily Night time urination help.  30 capsule 5    isosorbide mononitrate (IMDUR) 120 MG extended release tablet TAKE ONE TABLET BY MOUTH EVERY DAY      metoprolol tartrate (LOPRESSOR) 100 MG tablet Take 1 tablet by mouth 2 times daily 60 tablet 3    aspirin (ASPIRIN 81) 81 MG chewable tablet Take by mouth daily        No current facility-administered medications on file prior to visit.        Patient Active Problem List   Diagnosis Code    PTSD (post-traumatic stress disorder) F43.10    Depression F32.9    Pacemaker Z95.0    HTN (hypertension) I10    Spinal stenosis at L4-L5 level M48.061    Depression, major, recurrent, moderate (HCC) F33.1    Ventricular tachycardia (HCC) I47.2    ICD (implantable cardioverter-defibrillator) battery depletion Z45.02     _________________________________________________________  Past Medical History:   Diagnosis Date    [facility]     Chronic back pain     Depression     Hyperlipidemia     Hypertension     Pacemaker     Sleep apnea     TIA (transient ischemic attack)     Ventricular tachycardia ([facility]        Family History   Problem Relation Age of Onset    High Blood Pressure Father     Diabetes Sister     Heart Disease Brother     Diabetes Sister     Diabetes Sister        Past Surgical History:   Procedure Laterality Date    BACK SURGERY      CARDIAC CATHETERIZATION  09/15/2020    Dr. Arsalan Springer      UPGRADE PPM TO DUAL ICD    ([facility])   DR. Baldemar Boogie     CARDIAC PACEMAKER PLACEMENT      CHOLECYSTECTOMY      EYE SURGERY Left     FOOT SURGERY      VENTRICULOPERITONEAL SHUNT         Social History     Tobacco Use    Smoking status: Former Smoker     Packs/day: 1.00     Years: 1.00     Pack years: 1.00     Types: Cigarettes     Quit date:      Years since quittin.4    Smokeless tobacco: Never Used    Tobacco comment: quit 30 years   Vaping Use    Vaping Use: Never used   Substance Use Topics    Alcohol use: Not Currently     Comment: rare    Drug use: No       Chart reviewed and updated where appropriate for PMH, Fam, and Soc Hx.  _________________________________________________________  ROS: POSITIVE: As in the HPI. Otherwise Pertinent negatives are negative.    __________________________________________________________  Physical Exam   Constitutional:    He is oriented to person, place, and time. He appears well-developed and well-nourished. Eyes:    Conjunctivae are normal.    Pupils are equal, round, and reactive to light. EOMI. Neck:    Normal range of motion. No thyromegaly or nodules noted. No bruit. No LAD. Cardiovascular:    Normal rate, regular rhythm and normal heart sounds. No murmur. No gallop and no friction rub. Pulmonary/Chest:    Effort normal and breath sounds normal.    No wheezes. No rales or rhonchi. Abdominal:    Soft. Bowel sounds are normal.    No distension. No tenderness. Musculoskeletal:    Normal range of motion. No joint swelling noted. No peripheral edema. Skin:    Skin is warm and dry. No rashes, lesions. Psychiatric:    He has a normal mood and affect. Normal groom and dress. No SI or HI.   ________________________________________________________    This note may have been created using dictation software.  Efforts were made to reduce errors, but some may persist.

## 2021-06-18 NOTE — TELEPHONE ENCOUNTER
Spoke with patient, he declines at this time to have medication admission. Patient states he is feeling well.

## 2021-07-26 ENCOUNTER — TELEPHONE (OUTPATIENT)
Dept: FAMILY MEDICINE CLINIC | Age: 71
End: 2021-07-26

## 2021-07-26 DIAGNOSIS — K04.7 TOOTH INFECTION: ICD-10-CM

## 2021-07-26 RX ORDER — AMOXICILLIN 500 MG/1
500 CAPSULE ORAL 3 TIMES DAILY
Qty: 21 CAPSULE | Refills: 0 | Status: SHIPPED | OUTPATIENT
Start: 2021-07-26 | End: 2021-08-02

## 2021-07-26 NOTE — TELEPHONE ENCOUNTER
He is asking if you will give him another script for an antibiotic for his tooth. It is infected. He dose have a dental appointment on Aug 8th.

## 2021-07-27 ENCOUNTER — TELEPHONE (OUTPATIENT)
Dept: NON INVASIVE DIAGNOSTICS | Age: 71
End: 2021-07-27

## 2021-07-27 NOTE — TELEPHONE ENCOUNTER
[employer] Alert for successful ATP (see Media)    3 VT Episodes all treated with successful ATP         ~07/26/2021 ATP X1          ~ 07/24/2021 ATP X1          ~ 07/15/2021 ATP X2             Rates 145-167 duration 16-24 seconds    Presenting: [email or web address]    - Amiodarone restarted 4/14/21 and stopped 5/7/21 due to hives  - Mexiletine dose increase  to 250 mg tid on 5/7/21.  -Lopressor 100 mg bid  Last 2 D Echo 04/28/2020    Per Epic note Consider add on Tikosyn or Sotalol if VT recurs.  Patient declined Sotalol admission on 06/18/201  Currently has an infected tooth, has dental appointment [id number]  Tried to call patient but unable to leave message d/t mailbox is not setup    OV with device check 08/25/2021  Will forward to Dr Amarilys Davison for review pt up in bedside chair

## 2021-07-27 NOTE — TELEPHONE ENCOUNTER
----- Message from Sukhi Larsen MD sent at 7/27/2021 12:00 PM EDT -----  Increase Lopressor to 125 mg bid if he defers Sotalol admission. Thanks.  ----- Message -----  From: Aroldo Butts RN  Sent: 7/27/2021  11:26 AM EDT  To: Sukhi Larsen MD    Called patient twice today 7/27/2021 to review Dr Gabi Munoz above recommendations  But unfortunately,  no answer and unable to leave message d/t mailbox not set up yet. Will try again in the am.     07/28/2021 3:00PM Still unable to contact patient,mailbox not set up yet. 3 calls throughout the day but no answer    07/29/2021 9:30am Emergency contact Tucker Stevenson his sister) called and message was left on her machine to please have Bret call us regarding medication changes.

## 2021-07-30 NOTE — TELEPHONE ENCOUNTER
Patient called back and received new metoprolol tart dosing instructions (125mg bid). He will call back in a couple weeks to schedule the sotalol admission. His children are currently visiting him from [place] and he wishes to wait until they leave. I called in the new metoprolol dose to his pharmacy.

## 2021-08-19 NOTE — PROGRESS NOTES
Cardiac Electrophysiology Outpatient Progress Note    Allen Downey  1950  Date of Service: 8/25/2021  PCP: Kamini Rowland MD  Cardiologist: Patrick Rose MD   Electrophysiologist: Cynthia Cruz MD        Subjective: Allen Downey is seen in cardiac electrophysiology clinic for follow up and management of ventricular tachycardia and ICD in situ. Since last office visit, he was found to have sustained VT with successful ATP treatment in June 2021 and July 2021. He was advised to be admitted for Sotalol admission which he deferred. The patient reports feeling overall well. The patient denies any chest pain, dyspnea, palpitations, dizziness, syncope, orthopnea or paroxysmal nocturnal dyspnea. ICD interrogation today showed no recurrent VT since episodes in July 2021. Discussed with him at great length regarding Sotalol  Initiation to prevent recurrent VT. Patient Active Problem List   Diagnosis    PTSD (post-traumatic stress disorder)    Depression    Pacemaker    HTN (hypertension)    Spinal stenosis at L4-L5 level    Depression, major, recurrent, moderate (HCC)    Ventricular tachycardia ([address]    ICD (implantable cardioverter-defibrillator) battery depletion       Current Outpatient Medications   Medication Sig Dispense Refill    metoprolol tartrate (LOPRESSOR) 25 MG tablet Take 25 mg by mouth 2 times daily      escitalopram (LEXAPRO) 10 MG tablet TAKE ONE TABLET BY MOUTH IN THE EVENING WITH DINNER FOR DEPRESSION AND ANXIETY      gabapentin (NEURONTIN) 300 MG capsule Take 1 capsule by mouth 3 times daily for 90 days.  270 capsule 0    fluticasone (FLONASE ALLERGY RELIEF) 50 MCG/ACT nasal spray 1 spray by Each Nostril route daily 1 Bottle 1    busPIRone (BUSPAR) 10 MG tablet Take 1 tablet by mouth 3 times daily 90 tablet 2    omeprazole (PRILOSEC) 20 MG delayed release capsule Take 1 capsule by mouth Daily 30 capsule 2    lisinopril (PRINIVIL;ZESTRIL) 30 MG tablet Take 1 tablet by mouth daily For blood pressure. 90 tablet 1    mexiletine (MEXITIL) 250 MG capsule Take 1 capsule by mouth 3 times daily 90 capsule 11    HYDROcodone-acetaminophen (NORCO)  MG per tablet TAKE ONE TABLET FOUR TIMES DAILY AS NEEDED FOR PAIN      tamsulosin (FLOMAX) 0.4 MG capsule Take 1 capsule by mouth daily Night time urination help. 30 capsule 5    isosorbide mononitrate (IMDUR) 120 MG extended release tablet TAKE ONE TABLET BY MOUTH EVERY DAY      metoprolol tartrate (LOPRESSOR) 100 MG tablet Take 1 tablet by mouth 2 times daily 60 tablet 3    aspirin (ASPIRIN 81) 81 MG chewable tablet Take by mouth daily        No current facility-administered medications for this visit. Allergies   Allergen Reactions    Bee Venom     Naproxen     Meloxicam Nausea And Vomiting     ROS:   Constitutional: Negative for fever, activity change and appetite change. HENT: Negative for epistaxis. Eyes: Negative for diploplia, blurred vision. Respiratory: Negative for cough. Positive for chest tightness and hortness of breath. Negative of wheezing. Cardiovascular: pertinent positives in HPI  Gastrointestinal: Negative for abdominal pain and blood in stool. All other review of systems are negative     PHYSICAL EXAM:  Vitals:    08/25/21 1343   BP: 122/88   Pulse: 60   Resp: 18   Weight: 219 lb (99.3 kg)   Height: 6' (1.829 m)     Constitutional: Well-developed, no acute distress  Eyes: conjunctivae normal, no xanthelasma   Ears, Nose, Throat: oral mucosa moist, no cyanosis   CV: no JVD. Regular rate and rhythm. Normal S1S2 and no S3. No murmurs, rubs, or gallops. PMI is nondisplaced  Lungs: clear to auscultation bilaterally, normal respiratory effort without used of accessory muscles  Abdomen: soft, non-tender, bowel sounds present, no masses or hepatomegaly   Musculoskeletal: no digital clubbing, no edema   Skin: warm, no rashes  Device site: well healed. No erosion, infection or migration.     Pertinent Cardiac Testing:     Echocardiogram 4/28/20:     Stress Test 4/28/20:       Coronary angiogram 09/15/20: Left Main: Long.  Distal diffuse 30% stenosis. LAD: Moderately calcified.  Tortuous.  Mid eccentric diffuse 40  to 50% stenosis. D1: Mid bifurcating diffuse 80% stenosis.  This is less than a 2  mm vessel at this location. Cx: Severely tortuous.  Proximal diffuse 20 to 30%.  Mid diffuse 30 to 40%.  Diffuse mild luminal irregularities. OM1: Tiny vessel. OM2: No angiographically significant stenosis. RCA: Dominant.  Severely tortuous.  Distal mild diffuse luminal irregularities. PDA: Small vessel. PLB: No angiographically significant stenosis. ECG 8/25/2021: AP VS with RBBB and LAFB rate 60 bpm  ms, QTc 462 ms     Device Interrogation 8/25/21:  Make/Model [employer] Software Vigilant EL ICD  Mode DDDR 60/110  P wave: 1.7  Impedance: 399 ohms   Threshold: 0.7 V @ 0.4 ms  RV R wave: 11.4  Impedance: 382 ohms   Threshold: 0.6 V @ 0.4 ms  Pacing: A: 94%  RV: 18%    Battery Voltage/Longevity: 12 years    Charge time: 9.8 seconds   Arrhythmias: 10 VT events - ATP delivered for all events   - 2 VT events - latest on 7/26/2021, longest 24 seconds  Reprogramming included see below  Overall device function is normal    All device programmable settings were evaluated per above and in the scanned document, along with iterative adjustments (capture thresholds) to assess and select the most appropriate final programming to provide for consistent delivery of the appropriate therapy and to verify function of the device. Impression:    1. Sustained ventricular tachycardia  - Documented up to 4 minutes on pacemaker interrogation.  - Echocardiogram 4/28/20 showed LV EF of 55-60%.   - Nuclear stress test 4/28/20 showed no ischemia.  - LHC 09/15/20 required no intervention   - Status post ICD upgrade 9/23/20  - Started on Amiodarone 10/26/20 due to recurrent VT required ATPs and ICD shocks and discontinued 12/23/20 due to rash. - Started on Mexiletine 12/23/20.  - Still having breakthrough VT required ATPs lasted on 4/13/21.  - Amiodarone restarted 4/14/21 and stopped 5/7/21 due to hives  - Mexiletine dose increase  to 250 mg tid on 5/7/21.  - On Lopressor.  - No recurrence on today interrogation.   - CrCl: 52.36 mL/min based off Cr. of 1.4 from lab work from 4/14/2021     2. ICD in situ  - Pacemaker implanted in March if 2014  - Indication high grade AV block. - Pacemake upgrated to ICD 09/23/20 due to sustained VT.  - Normal device function.     3. High grade AV block   - Status post pacemaker implantation in March of 2014      4. Hypertension  - Elevated. - On Lopressor, Zestril and Imdur.     5. Hydrocephalus     6. RBBB  - Chronic. 7. BPH  - On Flomax. Recommendations:    1. Advised for elective admission for Sotalol initiation. 2. Continue Mexiletine 250 mg every 8 hours. 3. Continue Lopressor 125 mg bid. 4. Remote monitoring in 3 months  5. Follow up in 3 months or after admission. Encouraged the patient to call the office for any questions or concerns. Thank you for allowing me to participate in your patient's care. I have spent a total of 40 minutes with the patient and the family reviewing the above stated recommendations. And a total of >50% of that time involved face-to-face time providing counseling and or coordination of care with the other providers, preparation for the clinic visit, reviewing records/tests, counseling/education of the patient, ordering medications/tests/procedures, coordinating care, and documenting clinical information in the EHR.      Loli Flores MD  Cardiac Electrophysiology  [facility]  [facility] [place]: [place] [facility]  2:02 PM  8/25/2021

## 2021-08-25 ENCOUNTER — TELEPHONE (OUTPATIENT)
Dept: NON INVASIVE DIAGNOSTICS | Age: 71
End: 2021-08-25

## 2021-08-25 ENCOUNTER — OFFICE VISIT (OUTPATIENT)
Dept: NON INVASIVE DIAGNOSTICS | Age: 71
End: 2021-08-25
Payer: MEDICARE

## 2021-08-25 VITALS
RESPIRATION RATE: 18 BRPM | HEIGHT: 72 IN | WEIGHT: 219 LBS | DIASTOLIC BLOOD PRESSURE: 88 MMHG | SYSTOLIC BLOOD PRESSURE: 122 MMHG | HEART RATE: 60 BPM | BODY MASS INDEX: 29.66 KG/M2

## 2021-08-25 DIAGNOSIS — I47.20 VENTRICULAR TACHYCARDIA: Primary | ICD-10-CM

## 2021-08-25 PROCEDURE — 99215 OFFICE O/P EST HI 40 MIN: CPT | Performed by: INTERNAL MEDICINE

## 2021-08-25 PROCEDURE — 93283 PRGRMG EVAL IMPLANTABLE DFB: CPT | Performed by: INTERNAL MEDICINE

## 2021-08-25 RX ORDER — MEXILETINE HYDROCHLORIDE 250 MG/1
250 CAPSULE ORAL 3 TIMES DAILY
Qty: 90 CAPSULE | Refills: 11 | Status: SHIPPED | OUTPATIENT
Start: 2021-08-25 | End: 2021-09-28 | Stop reason: SDUPTHER

## 2021-08-25 NOTE — TELEPHONE ENCOUNTER
----- Message from Ana Chu MD sent at 8/25/2021  2:05 PM EDT -----  Please arrange for elective hospitalization for Sotalol initiation. Thanks.

## 2021-08-25 NOTE — TELEPHONE ENCOUNTER
I tried calling Bret at both numbers but no answer. I will mail him a letter stating the admission wlll be for 9/15/21 w/ CK.

## 2021-09-13 NOTE — TELEPHONE ENCOUNTER
Name of Medication(s) Requested:  Gabapentin    Pharmacy Requested:   [facility]    Medication(s) pended? [x] Yes  [] No    Last Appointment:  6/17/2021    Future appts:  Future Appointments   Date Time Provider Torey Ghislaine   9/17/2021 10:00 AM Estephania Banuelos  [address]   9/22/2021  9:50 AM SCHEDULE, REMOTE CHECK [place] [facility]          Does patient need call back?   [] Yes  [x] No

## 2021-09-14 RX ORDER — GABAPENTIN 300 MG/1
300 CAPSULE ORAL 3 TIMES DAILY
Qty: 270 CAPSULE | Refills: 0 | Status: SHIPPED
Start: 2021-09-14 | End: 2021-11-11 | Stop reason: SDUPTHER

## 2021-09-27 ENCOUNTER — TELEPHONE (OUTPATIENT)
Dept: NON INVASIVE DIAGNOSTICS | Age: 71
End: 2021-09-27

## 2021-09-27 ENCOUNTER — TELEPHONE (OUTPATIENT)
Dept: FAMILY MEDICINE CLINIC | Age: 71
End: 2021-09-27

## 2021-09-27 DIAGNOSIS — Z96.652 HISTORY OF LEFT KNEE REPLACEMENT: ICD-10-CM

## 2021-09-27 DIAGNOSIS — M25.562 CHRONIC PAIN OF LEFT KNEE: Primary | ICD-10-CM

## 2021-09-27 DIAGNOSIS — G89.29 CHRONIC RIGHT SHOULDER PAIN: ICD-10-CM

## 2021-09-27 DIAGNOSIS — M25.50 POLYARTHRALGIA: ICD-10-CM

## 2021-09-27 DIAGNOSIS — G89.29 CHRONIC PAIN OF LEFT KNEE: Primary | ICD-10-CM

## 2021-09-27 DIAGNOSIS — M25.511 CHRONIC RIGHT SHOULDER PAIN: ICD-10-CM

## 2021-09-27 RX ORDER — QUINIDINE GLUCONATE 324 MG/1
324 TABLET, EXTENDED RELEASE ORAL 3 TIMES DAILY
Qty: 90 TABLET | Refills: 5 | Status: SHIPPED
Start: 2021-09-27 | End: 2021-09-28 | Stop reason: SDUPTHER

## 2021-09-27 RX ORDER — QUINIDINE GLUCONATE 324 MG/1
324 TABLET, EXTENDED RELEASE ORAL 3 TIMES DAILY
COMMUNITY
End: 2021-09-27 | Stop reason: SDUPTHER

## 2021-09-27 NOTE — TELEPHONE ENCOUNTER
Another option is try quinidine gluconate 324 mg tid and EKG in 1 week and consider EP study with ablation if recurs.  Thanks

## 2021-09-27 NOTE — TELEPHONE ENCOUNTER
----- Message from [place] Siobhan sent at 9/27/2021  9:46 AM EDT -----  Subject: Referral Request    QUESTIONS   Reason for referral request? Patient would like to see Dr Nathaly Hardwick regarding his knee pain. Patient stated he can barely walk @   [facility], [address] number [phone]   Has the physician seen you for this condition before? Yes  Select a date? 2021-06-17  Select the Provider the patient wants to be referred to, if known (PCP or   Specialist)? Outside Physician - Joe Riojas   Preferred Specialist (if applicable)? Do you already have an appointment scheduled? No  Additional Information for Provider? Please call patient to advise. Patient doesn't have voicemail, but has accept.   ---------------------------------------------------------------------------  --------------  CALL BACK INFO  What is the best way for the office to contact you? Do not leave any   message, patient will call back for answer  Preferred Call Back Phone Number?  [phone]

## 2021-09-27 NOTE — TELEPHONE ENCOUNTER
How much does it cost? Should not be expensive. Is he get his meds through [place]? We can write the letter to support as he developed side effect from Amiodarone. Thanks.

## 2021-09-27 NOTE — TELEPHONE ENCOUNTER
OK i'll order that. But I actually already did this in January 2021. Im wondering what happened with that.

## 2021-09-28 DIAGNOSIS — I47.20 VENTRICULAR TACHYCARDIA: ICD-10-CM

## 2021-09-28 RX ORDER — QUINIDINE GLUCONATE 324 MG/1
324 TABLET, EXTENDED RELEASE ORAL 3 TIMES DAILY
Qty: 90 TABLET | Refills: 5 | Status: SHIPPED | OUTPATIENT
Start: 2021-09-28

## 2021-09-28 RX ORDER — MEXILETINE HYDROCHLORIDE 250 MG/1
250 CAPSULE ORAL 3 TIMES DAILY
Qty: 90 CAPSULE | Refills: 11 | Status: SHIPPED | OUTPATIENT
Start: 2021-09-28 | End: 2022-06-20

## 2021-10-12 ENCOUNTER — TELEPHONE (OUTPATIENT)
Dept: NON INVASIVE DIAGNOSTICS | Age: 71
End: 2021-10-12

## 2021-10-12 NOTE — TELEPHONE ENCOUNTER
1500: Patient called back. He states he did not call the office yesterday. I apologized for any inconvenience. 1445: still no answer at home. 1202: still no answer at home. 1872: Tried calling patient with Dr. Cynthia Lincoln recommendation. Patient's voicemail not set up. Will try later on today.      ----- Message from Zofia Hardwick MD sent at 10/12/2021  9:17 AM EDT -----  I would wait for now. Thanks.  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 He had VT in June and July. He did not want to be admitted to start Sotalol. Not sure about bouncing around on a farm tractor in the fields.

## 2021-10-14 ENCOUNTER — TELEPHONE (OUTPATIENT)
Dept: NON INVASIVE DIAGNOSTICS | Age: 71
End: 2021-10-14

## 2021-10-14 NOTE — TELEPHONE ENCOUNTER
Latitude alert for shock:     10/14/2021: 1 VT treated with ATPx 4 41J shock  10/14/2021 1 VT treated with ATP x1     Forwarding to Dr. Joel Brown to review. Called and spoke with patient. He confirms that he is taking mexitil 250mg TID, Guinidine gluconate 324mg TID and Lopressor 125mg BID. Patient denies missing any doses of medication. He states that he was walking across the yard when he got shocked. He states he saw a white flash at the time of the shock. He states he just feels tired today. Instructed if he gets any more shocks to report to [facility]. Patient voiced understanding.        Sharmaine Kelley

## 2021-10-20 ENCOUNTER — NURSE ONLY (OUTPATIENT)
Dept: NON INVASIVE DIAGNOSTICS | Age: 71
End: 2021-10-20

## 2021-10-20 NOTE — TELEPHONE ENCOUNTER
Spoke with Evelia Pyle at [facility] services regarding possible programming changes due to inappropriate shock for SVT. She reviewed shock from 10/14/2021. This rhythm was stable at 1ms. The SVT was at a rate of 140 therefore did not reach the AF threshold of 170. The only option we have that could possible help prevent future shocks for SVT is to turn on onset stability. Unfortunately she is not sure if this will help or not since we can not see the onset of the rhythm. If the rhythm is not gradual the onset stability will not work. Since patient has a history of slow VT as well at a rate of 150 we are unable to change the detection rate. Forwarding to Dr. Annalee Bosch for any further recommendations.      Sharmaine Kelley

## 2021-10-21 NOTE — TELEPHONE ENCOUNTER
Patient notified in office on 10/20/2021 to increase his Toprol. Patient voiced understanding. Medication list updated. Patient will call when he needs refills.      Sharmaine Kelley

## 2021-11-04 ENCOUNTER — TELEPHONE (OUTPATIENT)
Dept: ADMINISTRATIVE | Age: 71
End: 2021-11-04

## 2021-11-04 NOTE — TELEPHONE ENCOUNTER
Called and spoke to Rohan James. He is going to send a remote transmission once he gets home.    [place]  Next appointment is 12/23/2021 with Dr Mervat Beard

## 2021-11-04 NOTE — TELEPHONE ENCOUNTER
Pt calling [facility] cleaning sooner apt with Dr. Hemalatha Garcia then 12/23/21. He is c/o of SOB for quite some time and he would like to know if he has any activity restrictions - Ok to go hunting etc.? Please return his call with apt/advise. Thank you.

## 2021-11-05 NOTE — TELEPHONE ENCOUNTER
Patient called back. Reviewed Dr. Dorene Suarez recommendations.  Scheduled device check for 11/9/2021 @ 9:30    Sharmaine Kelley

## 2021-11-05 NOTE — TELEPHONE ENCOUNTER
Please ask him to come in for adjustment of rate responsive. I do not see any contraindication to go for hunting from EP and device standpoints. Thanks.

## 2021-11-09 ENCOUNTER — TELEPHONE (OUTPATIENT)
Dept: NON INVASIVE DIAGNOSTICS | Age: 71
End: 2021-11-09

## 2021-11-10 ENCOUNTER — TELEPHONE (OUTPATIENT)
Dept: NON INVASIVE DIAGNOSTICS | Age: 71
End: 2021-11-10

## 2021-11-10 NOTE — TELEPHONE ENCOUNTER
Patient called to reschedule appt for today, to get device adjusted. Patient wanted tomorrow 11/11/2021 @ 1:30, time is not available, needs to come in the morning or another day.

## 2021-11-11 ENCOUNTER — TELEPHONE (OUTPATIENT)
Dept: FAMILY MEDICINE CLINIC | Age: 71
End: 2021-11-11

## 2021-11-11 ENCOUNTER — NURSE ONLY (OUTPATIENT)
Dept: NON INVASIVE DIAGNOSTICS | Age: 71
End: 2021-11-11

## 2021-11-11 RX ORDER — GABAPENTIN 300 MG/1
300 CAPSULE ORAL 3 TIMES DAILY
Qty: 270 CAPSULE | Refills: 0 | Status: SHIPPED
Start: 2021-11-11 | End: 2022-07-05

## 2021-11-11 NOTE — TELEPHONE ENCOUNTER
Last Appointment:  6/17/2021  Future Appointments   Date Time Provider Torey Scanlon   12/14/2021  2:20 PM [place] [facility]  [address] [place] [address]   12/23/2021 12:45 PM SCHEDULE, DEVICE CLINIC [address] [place] [facility]   12/23/2021 12:45 PM Zofia Hardwick MD [facility]

## 2021-11-11 NOTE — TELEPHONE ENCOUNTER
Patient never rescheduled his AWV from september. He also needs medication refills. Can we use a provider leidy day?

## 2021-11-17 ENCOUNTER — TELEPHONE (OUTPATIENT)
Dept: NON INVASIVE DIAGNOSTICS | Age: 71
End: 2021-11-17

## 2021-11-17 ENCOUNTER — NURSE ONLY (OUTPATIENT)
Dept: NON INVASIVE DIAGNOSTICS | Age: 71
End: 2021-11-17
Payer: MEDICARE

## 2021-11-17 DIAGNOSIS — Z95.810 ICD (IMPLANTABLE CARDIOVERTER-DEFIBRILLATOR), DUAL, IN SITU: ICD-10-CM

## 2021-11-17 DIAGNOSIS — I50.22 CHRONIC SYSTOLIC CONGESTIVE HEART FAILURE (HCC): ICD-10-CM

## 2021-11-17 DIAGNOSIS — I47.20 VENTRICULAR TACHYCARDIA: Primary | ICD-10-CM

## 2021-11-17 PROCEDURE — 93000 ELECTROCARDIOGRAM COMPLETE: CPT | Performed by: INTERNAL MEDICINE

## 2021-11-17 NOTE — TELEPHONE ENCOUNTER
PT notified and understood. Electronically signed by Jennifer Montes MA on 11/17/2021 at 11:41 AM  ----- Message from Tim Carter MD sent at 11/17/2021 11:33 AM EST -----  Continue current medical therapy.  Thanks.  ----- Message -----  From: Jennifer Montes MA  Sent: 11/17/2021  10:42 AM EST  To: Tim Carter MD

## 2021-11-17 NOTE — PROGRESS NOTES
Patient was seen in the Office today to have EKG per Dr. Birdgette Beasley. Pt tolerated EKG. Pt states he gets SOB easily but no other cardiac complaints.    Electronically signed by Hiral Flores MA on 11/17/2021 at 10:39 AM

## 2021-12-09 ENCOUNTER — OFFICE VISIT (OUTPATIENT)
Dept: FAMILY MEDICINE CLINIC | Age: 71
End: 2021-12-09
Payer: MEDICARE

## 2021-12-09 VITALS
BODY MASS INDEX: 30.92 KG/M2 | OXYGEN SATURATION: 96 % | SYSTOLIC BLOOD PRESSURE: 160 MMHG | WEIGHT: 228 LBS | TEMPERATURE: 97.3 F | HEART RATE: 61 BPM | DIASTOLIC BLOOD PRESSURE: 100 MMHG

## 2021-12-09 DIAGNOSIS — R35.0 URINARY FREQUENCY: ICD-10-CM

## 2021-12-09 DIAGNOSIS — R35.0 URINARY FREQUENCY: Primary | ICD-10-CM

## 2021-12-09 LAB
BILIRUBIN, POC: NORMAL
BLOOD URINE, POC: NORMAL
CHP ED QC CHECK: NORMAL
CLARITY, POC: CLEAR
COLOR, POC: YELLOW
GLUCOSE BLD-MCNC: 89 MG/DL
GLUCOSE URINE, POC: NORMAL
KETONES, POC: NORMAL
LEUKOCYTE EST, POC: NORMAL
NITRITE, POC: NORMAL
PH, POC: 6
PROTEIN, POC: NORMAL
SPECIFIC GRAVITY, POC: 1.01
UROBILINOGEN, POC: 0.2

## 2021-12-09 PROCEDURE — 99213 OFFICE O/P EST LOW 20 MIN: CPT | Performed by: PHYSICIAN ASSISTANT

## 2021-12-09 PROCEDURE — 81002 URINALYSIS NONAUTO W/O SCOPE: CPT | Performed by: PHYSICIAN ASSISTANT

## 2021-12-09 PROCEDURE — 82962 GLUCOSE BLOOD TEST: CPT | Performed by: PHYSICIAN ASSISTANT

## 2021-12-09 NOTE — PROGRESS NOTES
Chief Complaint       Urinary Frequency (up all night urinating )      History of Present Illness   Source of history provided by:  patient. Andrés Ponce is a 66 y.o. old male presenting to the walk in clinic for evaluation of urinary frequency which has been present for the past year but seems to be worsening recently. Patient believes that he was diagnosed with BPH previously. He is currently taking daily Flomax. Denies any recent changes to his medications. Denies any dysuria. He does report occasional difficulty starting his stream. Denies gross hematuria. Denies associated flank pain. Denies any fever, chills, vomiting, diarrhea, or lethargy. ROS    Unless otherwise stated in this report or unable to obtain because of the patient's clinical or mental status as evidenced by the medical record, this patients's positive and negative responses for Review of Systems, constitutional, psych, eyes, ENT, cardiovascular, respiratory, gastrointestinal, neurological, genitourinary, musculoskeletal, integument systems and systems related to the presenting problem are either stated in the preceding or were not pertinent or were negative for the symptoms and/or complaints related to the medical problem. Past Medical History:  has a past medical history of Cerebral artery occlusion with cerebral infarction [facility]), Chronic back pain, Depression, Hyperlipidemia, Hypertension, Pacemaker, Sleep apnea, TIA (transient ischemic attack), and Ventricular tachycardia ([address] Past Surgical History:  has a past surgical history that includes Ventriculoperitoneal shunt (1994); Cholecystectomy (1999); Cardiac pacemaker placement (2014); Eye surgery (Left); back surgery; Foot surgery; Cardiac catheterization (09/15/2020); and Cardiac defibrillator placement (9/223/20). Social History:  reports that he quit smoking about 52 years ago. His smoking use included cigarettes. He has a 1.00 pack-year smoking history.  He has never used smokeless tobacco. He reports previous alcohol use. He reports that he does not use drugs. Family History: family history includes Diabetes in his sister, sister, and sister; Heart Disease in his brother; High Blood Pressure in his father. Allergies: Bee venom, Naproxen, and Meloxicam    Physical Exam         VS:  BP (!) 160/100   Pulse 61   Temp 97.3 °F (36.3 °C)   Wt 228 lb (103.4 kg)   SpO2 96%   BMI 30.92 kg/m²    Oxygen Saturation Interpretation: Normal.    Constitutional:  A&Ox3, development consistent with age, NAD. Lungs:  CTAB without wheezing, rales, or rhonchi. Heart:  RRR without pathologic murmurs, rubs, or gallops. Abdomen: Soft, nondistended, nontender. No rebound, rigidity, or guarding. BS+ X4. No organomegaly. Back: No CVA tenderness. Skin:  Normal turgor. Warm, dry, without visible rash, unless noted elsewhere. Neurological:  Alert and oriented. Motor functions intact. Responds to verbal commands. Lab / Imaging Results   (All laboratory and radiology results have been personally reviewed by myself)  Labs:  Results for orders placed or performed in visit on 12/09/21   POCT Glucose   Result Value Ref Range    Glucose 89 mg/dL    QC OK? POCT Urinalysis no Micro   Result Value Ref Range    Color, UA yellow     Clarity, UA clear     Glucose, UA POC neg     Bilirubin, UA neg     Ketones, UA neg     Spec Grav, UA 1.015     Blood, UA POC trace     pH, UA 6.0     Protein, UA POC neg     Urobilinogen, UA 0.2     Leukocytes, UA neg     Nitrite, UA neg        Imaging: All Radiology results interpreted by Radiologist unless otherwise noted. Assessment / Plan     Impression(s):  Cristine Temple was seen today for urinary frequency. Diagnoses and all orders for this visit:    Urinary frequency  -     POCT Glucose  -     POCT Urinalysis no Micro  -     Culture, Urine; Future      Disposition:  Disposition: Discharge to home.     Fingerstick glucose obtained in office today which came back normal at 89. Urinalysis also appears within normal limits aside from trace blood. Urine C&S pending, will call with results once available. Symptoms are likely a result of overactive bladder and/or BPH. Patient has a follow-up appointment coming up with his PCP in 5 days. Advised to keep this as scheduled to discuss the symptoms and need for further work-up. Continue to increase fluid intake and avoid bladder irritants like caffeine to prevent exacerbation. F/u PCP in 3-5 days if symptoms persist. ED sooner if symptoms worsen or change. ED immediately with the development of fever, shaking chills, body aches, flank pain, vomiting, CP, or SOB. Pt is in agreement with this care plan. All questions answered. Ching Vang PA-C    **This report was transcribed using voice recognition software. Every effort was made to ensure accuracy; however, inadvertent computerized transcription errors may be present.

## 2021-12-12 LAB — URINE CULTURE, ROUTINE: NORMAL

## 2021-12-28 ENCOUNTER — OFFICE VISIT (OUTPATIENT)
Dept: FAMILY MEDICINE CLINIC | Age: 71
End: 2021-12-28
Payer: MEDICARE

## 2021-12-28 VITALS
OXYGEN SATURATION: 98 % | TEMPERATURE: 97.8 F | HEIGHT: 72 IN | BODY MASS INDEX: 30.34 KG/M2 | DIASTOLIC BLOOD PRESSURE: 88 MMHG | SYSTOLIC BLOOD PRESSURE: 138 MMHG | WEIGHT: 224 LBS | RESPIRATION RATE: 17 BRPM | HEART RATE: 59 BPM

## 2021-12-28 DIAGNOSIS — U07.1 COVID-19: Primary | ICD-10-CM

## 2021-12-28 LAB
INFLUENZA A ANTIGEN, POC: NEGATIVE
INFLUENZA B ANTIGEN, POC: NEGATIVE
Lab: ABNORMAL
PERFORMING INSTRUMENT: ABNORMAL
QC PASS/FAIL: ABNORMAL
SARS-COV-2, POC: DETECTED

## 2021-12-28 PROCEDURE — 87426 SARSCOV CORONAVIRUS AG IA: CPT | Performed by: PHYSICIAN ASSISTANT

## 2021-12-28 PROCEDURE — 99213 OFFICE O/P EST LOW 20 MIN: CPT | Performed by: PHYSICIAN ASSISTANT

## 2021-12-28 PROCEDURE — 96372 THER/PROPH/DIAG INJ SC/IM: CPT | Performed by: PHYSICIAN ASSISTANT

## 2021-12-28 PROCEDURE — 87804 INFLUENZA ASSAY W/OPTIC: CPT | Performed by: PHYSICIAN ASSISTANT

## 2021-12-28 RX ORDER — PREDNISONE 20 MG/1
20 TABLET ORAL 2 TIMES DAILY
Qty: 10 TABLET | Refills: 0 | Status: SHIPPED | OUTPATIENT
Start: 2021-12-28 | End: 2022-01-02

## 2021-12-28 RX ORDER — ONDANSETRON 4 MG/1
4 TABLET, FILM COATED ORAL EVERY 8 HOURS PRN
Qty: 15 TABLET | Refills: 0 | Status: SHIPPED | OUTPATIENT
Start: 2021-12-28

## 2021-12-28 RX ORDER — AZITHROMYCIN 250 MG/1
TABLET, FILM COATED ORAL
Qty: 6 TABLET | Refills: 0 | Status: SHIPPED
Start: 2021-12-28 | End: 2022-03-09

## 2021-12-28 RX ORDER — DEXAMETHASONE SODIUM PHOSPHATE 10 MG/ML
10 INJECTION INTRAMUSCULAR; INTRAVENOUS ONCE
Status: COMPLETED | OUTPATIENT
Start: 2021-12-28 | End: 2021-12-28

## 2021-12-28 RX ADMIN — DEXAMETHASONE SODIUM PHOSPHATE 10 MG: 10 INJECTION INTRAMUSCULAR; INTRAVENOUS at 13:48

## 2021-12-29 NOTE — PROGRESS NOTES
years ago. His smoking use included cigarettes. He has a 1.00 pack-year smoking history. He has never used smokeless tobacco. He reports previous alcohol use. He reports that he does not use drugs. Family History: family history includes Diabetes in his sister, sister, and sister; Heart Disease in his brother; High Blood Pressure in his father. Allergies: Bee venom, Naproxen, and Meloxicam    Physical Exam         VS:  /88   Pulse 59   Temp 97.8 °F (36.6 °C) (Temporal)   Resp 17   Ht 6' (1.829 m)   Wt 224 lb (101.6 kg)   SpO2 98%   BMI 30.38 kg/m²    Oxygen Saturation Interpretation: Normal.    Constitutional:  Alert, development consistent with age. NAD. Patient is mildly ill-appearing but is nontoxic in appearance. Head:  NC/NT. Airway patent. Mouth: Posterior pharynx with mild erythema and clear postnasal drip. No tonsillar hypertrophy or exudate. Neck:  Normal ROM. Supple. No anterior cervical adenopathy noted. Lungs: CTAB without wheezes, rales, or rhonchi. GI: Bowel sounds positive x4 quadrants. Nontender, nondistended without guarding rebound or rigidity. CV:  Regular rate and rhythm, normal heart sounds, without pathological murmurs, ectopy, gallops, or rubs. Skin:  Normal turgor. Warm, dry, without visible rash. Lymphatic: No lymphangitis or adenopathy noted. Neurological:  Oriented. Motor functions intact. Lab / Imaging Results   (All laboratory and radiology results have been personally reviewed by myself)  Labs:  Results for orders placed or performed in visit on 12/28/21   POCT COVID-19, Antigen   Result Value Ref Range    SARS-COV-2, POC Detected (A) Not Detected    Lot Number [id number]     QC Pass/Fail pass     Performing Instrument BD Veritor    POCT Influenza A/B Antigen (BD Veritor)   Result Value Ref Range    Inflenza A Ag negative     Influenza B Ag negative        Imaging: All Radiology results interpreted by Radiologist unless otherwise noted.       Assessment / Plan     Impression(s):  Emil Linder was seen today for generalized body aches, shortness of breath, nausea & vomiting and chills. Diagnoses and all orders for this visit:    COVID-19  -     POCT COVID-19, Antigen  -     POCT Influenza A/B Antigen (BD Veritor)  -     predniSONE (DELTASONE) 20 MG tablet; Take 1 tablet by mouth 2 times daily for 5 days  -     azithromycin (ZITHROMAX Z-EMIR) 250 MG tablet; Take 2 tabs on day one, then 1 tab daily for the next 4 days  -     ondansetron (ZOFRAN) 4 MG tablet; Take 1 tablet by mouth every 8 hours as needed for Nausea or Vomiting  -     dexamethasone (DECADRON) injection 10 mg      Disposition:  Disposition: Discharge to home. Rapid COVID-19 testing is positive in office. Advised strict 10-day quarantine from start of illness. Pt should remain out of the general public for at least 10 days from the start of symptoms. Pt should also be fever free for 24 hours and symptoms should be improved overall prior to returning. Increase fluids and rest.  Decadron injection administered in office today, potential reactions discussed. Prescription also written for Zithromax, Zofran, and a prednisone burst, side effects discussed. Advised to hold oral steroids until tomorrow. Additional symptomatic relief discussed including Tylenol prn pain/fever. Schedule virtual f/u with PCP in 7-10 days if symptoms persist. ED sooner if symptoms worsen or change. ED immediately with high or refractory fever, progressive SOB, dyspnea, CP, calf pain/swelling, shaking chills, refractory vomiting, abdominal pain, lethargy, flank pain, or decreased urinary output. Pt verbalizes understanding and is in agreement with plan of care. All questions answered. Peg Vang PA-C    **This report was transcribed using voice recognition software. Every effort was made to ensure accuracy; however, inadvertent computerized transcription errors may be present.

## 2022-01-04 ENCOUNTER — OFFICE VISIT (OUTPATIENT)
Dept: FAMILY MEDICINE CLINIC | Age: 72
End: 2022-01-04
Payer: MEDICARE

## 2022-01-04 VITALS
OXYGEN SATURATION: 97 % | BODY MASS INDEX: 30.61 KG/M2 | HEART RATE: 60 BPM | DIASTOLIC BLOOD PRESSURE: 72 MMHG | HEIGHT: 72 IN | SYSTOLIC BLOOD PRESSURE: 140 MMHG | RESPIRATION RATE: 16 BRPM | TEMPERATURE: 96.9 F | WEIGHT: 226 LBS

## 2022-01-04 DIAGNOSIS — U07.1 COVID-19: Primary | ICD-10-CM

## 2022-01-04 DIAGNOSIS — R11.0 NAUSEA: ICD-10-CM

## 2022-01-04 PROCEDURE — 99213 OFFICE O/P EST LOW 20 MIN: CPT | Performed by: FAMILY MEDICINE

## 2022-01-04 RX ORDER — DEXAMETHASONE 6 MG/1
6 TABLET ORAL
Qty: 5 TABLET | Refills: 0 | Status: SHIPPED | OUTPATIENT
Start: 2022-01-04 | End: 2022-01-09

## 2022-01-04 NOTE — Clinical Note
[address] [address]  Phone: [phone]  Fax: [phone] [facility], DO        January 4, 2022     Patient: Norma Umaña   YOB: 1950   Date of Visit: 1/4/2022       To Whom It May Concern: It is my medical opinion that Norma Umaña {Work release (duty restriction):04604}. If you have any questions or concerns, please don't hesitate to call.     Sincerely,        Riki Garcia, DO

## 2022-01-04 NOTE — LETTER
[address] [address]  Phone: [phone]  Fax: 741 [facility],         January 4, 2022     Patient: Anita Lacey   YOB: 1950   Date of Visit: 1/4/2022       To Whom it May Concern:    Anita Lacey was seen in my clinic on 1/4/2022. It is my medical opinion that he should self-quarantine away from court and the general public as they tested positive for COVID-19 on 12/28/2022 and is still experiencing symptoms. Return to court with no retesting should be followed if meets these 3 criteria as outlined by CDC/ODH:     a. No fever without the use of fever reducers for 24 hours  b. Improvement in symptoms  c. At least 10 days since onset of symptoms (1/10/2022). If you have any questions or concerns, please don't hesitate to call.     Sincerely,         Shekhar Dee DO

## 2022-01-04 NOTE — PROGRESS NOTES
22  Didier Harden : 1950 Sex: male  Age: 66 y.o. Assessment and Plan:  [facility] was seen today for fatigue, nausea and positive for covid-19. Diagnoses and all orders for this visit:    COVID-19  -     dexamethasone (DECADRON) 6 MG tablet; Take 1 tablet by mouth daily (with breakfast) for 5 days    Nausea    MDM: Extend steroid treatment for another 5 days with dexamethasone. Continue with other symptomatic treatment including Tylenol, fluids, rest, Mucinex, Claritin if indicated. Prescribed Zofran initial walk-in visit last week, he can continue that for nausea and vomiting. Should his complaints not improve or worsen in any way, he needs rechecked immediately. Return 3 to 5 days if not improved. .    Chief Complaint   Patient presents with    Fatigue     Cannot sleep at night    Nausea     Occasional     Positive For Covid-19     Was given prednisone and a shot on , felt better for a while, then got worse. Congestion, pressure, drainage, facial tenderness, mild headache, fatigue, nausea COVID-19 diagnosed . He was treated with Methasone and prednisone taper improvement but then worsened again after completion. Denies fever, chills, diaphoresis, nausea, vomiting, decreased oral intake. Denies other GI or  complaints. OTC treatments minimally effective. Review of Systems   Constitutional: Negative for appetite change, fatigue and unexpected weight change. HENT: Negative for congestion, ear pain, hearing loss, sinus pain, sore throat and trouble swallowing. Eyes: Negative for photophobia, pain, discharge and redness. Respiratory: Negative for cough, chest tightness and shortness of breath. Cardiovascular: Negative for chest pain, palpitations and leg swelling. Gastrointestinal: Positive for nausea. Negative for abdominal pain, blood in stool, diarrhea and vomiting. Endocrine: Negative.     Genitourinary: Negative for dysuria, flank pain, frequency and hematuria. Musculoskeletal: Positive for myalgias. Negative for arthralgias, back pain and joint swelling. Skin: Negative. Allergic/Immunologic: Negative. Neurological: Negative for dizziness, seizures, syncope, weakness, light-headedness, numbness and headaches. Hematological: Negative for adenopathy. Does not bruise/bleed easily. Psychiatric/Behavioral: Negative. Current Outpatient Medications:     dexamethasone (DECADRON) 6 MG tablet, Take 1 tablet by mouth daily (with breakfast) for 5 days, Disp: 5 tablet, Rfl: 0    gabapentin (NEURONTIN) 300 MG capsule, Take 1 capsule by mouth 3 times daily for 90 days. , Disp: 270 capsule, Rfl: 0    mexiletine (MEXITIL) 250 MG capsule, Take 1 capsule by mouth 3 times daily, Disp: 90 capsule, Rfl: 11    quiNIDine gluconate 324 MG extended release tablet, Take 1 tablet by mouth 3 times daily, Disp: 90 tablet, Rfl: 5    metoprolol tartrate (LOPRESSOR) 25 MG tablet, Take 50 mg by mouth 2 times daily Take with Lopressor 100mg BID. Totally 150mg BID, Disp: , Rfl:     fluticasone (FLONASE ALLERGY RELIEF) 50 MCG/ACT nasal spray, 1 spray by Each Nostril route daily, Disp: 1 Bottle, Rfl: 1    busPIRone (BUSPAR) 10 MG tablet, Take 1 tablet by mouth 3 times daily, Disp: 90 tablet, Rfl: 2    omeprazole (PRILOSEC) 20 MG delayed release capsule, Take 1 capsule by mouth Daily, Disp: 30 capsule, Rfl: 2    lisinopril (PRINIVIL;ZESTRIL) 30 MG tablet, Take 1 tablet by mouth daily For blood pressure., Disp: 90 tablet, Rfl: 1    tamsulosin (FLOMAX) 0.4 MG capsule, Take 1 capsule by mouth daily Night time urination help., Disp: 30 capsule, Rfl: 5    metoprolol tartrate (LOPRESSOR) 100 MG tablet, Take 1 tablet by mouth 2 times daily (Patient taking differently: Take 100 mg by mouth 2 times daily Take with 50mg BID.  Totally 150mg BID), Disp: 60 tablet, Rfl: 3    aspirin (ASPIRIN 81) 81 MG chewable tablet, Take by mouth daily , Disp: , Rfl:    azithromycin (ZITHROMAX Z-EMIR) 250 MG tablet, Take 2 tabs on day one, then 1 tab daily for the next 4 days (Patient not taking: Reported on 1/4/2022), Disp: 6 tablet, Rfl: 0    ondansetron (ZOFRAN) 4 MG tablet, Take 1 tablet by mouth every 8 hours as needed for Nausea or Vomiting (Patient not taking: Reported on 1/4/2022), Disp: 15 tablet, Rfl: 0    escitalopram (LEXAPRO) 10 MG tablet, TAKE ONE TABLET BY MOUTH IN THE EVENING WITH DINNER FOR DEPRESSION AND ANXIETY (Patient not taking: Reported on 1/4/2022), Disp: , Rfl:     HYDROcodone-acetaminophen ([facility])  MG per tablet, TAKE ONE TABLET FOUR TIMES DAILY AS NEEDED FOR PAIN (Patient not taking: Reported on 1/4/2022), Disp: , Rfl:     isosorbide mononitrate (IMDUR) 120 MG extended release tablet, TAKE ONE TABLET BY MOUTH EVERY DAY (Patient not taking: Reported on 1/4/2022), Disp: , Rfl:   Allergies   Allergen Reactions    Bee Venom     Naproxen      Patient states he takes aleve and is not allergic to it     Meloxicam Nausea And Vomiting       Past Medical History:   Diagnosis Date    Cerebral artery occlusion with cerebral infarction ([address]     Chronic back pain     Depression     Hyperlipidemia     Hypertension     Pacemaker     Sleep apnea     TIA (transient ischemic attack)     Ventricular tachycardia ([address]      Past Surgical History:   Procedure Laterality Date    BACK SURGERY      CARDIAC CATHETERIZATION  09/15/2020    Dr. Antonio Villafana  9/223/20    UPGRADE PPM TO DUAL ICD    ([employer])   DR. Daniel More     CARDIAC PACEMAKER PLACEMENT  2014    CHOLECYSTECTOMY  1999    EYE SURGERY Left     FOOT SURGERY      VENTRICULOPERITONEAL SHUNT  1994     Family History   Problem Relation Age of Onset    High Blood Pressure Father     Diabetes Sister     Heart Disease Brother     Diabetes Sister     Diabetes Sister      Social History     Socioeconomic History    Marital status:      Spouse name: Not on file  Number of children: Not on file    Years of education: Not on file    Highest education level: Not on file   Occupational History    Not on file   Tobacco Use    Smoking status: Former Smoker     Packs/day: 1.00     Years: 1.00     Pack years: 1.00     Types: Cigarettes     Quit date: 55     Years since quittin.0    Smokeless tobacco: Never Used    Tobacco comment: quit 30 years   Vaping Use    Vaping Use: Never used   Substance and Sexual Activity    Alcohol use: Not Currently     Comment: rare    Drug use: No    Sexual activity: Not on file   Other Topics Concern    Not on file   Social History Narrative    Not on file     Social Determinants of Health     Financial Resource Strain: High Risk    Difficulty of Paying Living Expenses: Hard   Food Insecurity: Food Insecurity Present    Worried About Running Out of Food in the Last Year: Often true    [place] of Food in the Last Year: Sometimes true   Transportation Needs:     Lack of Transportation (Medical): Not on file    Lack of Transportation (Non-Medical):  Not on file   Physical Activity:     Days of Exercise per Week: Not on file    Minutes of Exercise per Session: Not on file   Stress:     Feeling of Stress : Not on file   Social Connections:     Frequency of Communication with Friends and Family: Not on file    Frequency of Social Gatherings with Friends and Family: Not on file    Attends Voodoo Services: Not on file    Active Member of [address] [place] [address] or Organizations: Not on file    Attends Club or Organization Meetings: Not on file    Marital Status: Not on file   Intimate Partner Violence:     Fear of Current or Ex-Partner: Not on file    Emotionally Abused: Not on file    Physically Abused: Not on file    Sexually Abused: Not on file   Housing Stability:     Unable to Pay for Housing in the Last Year: Not on file    Number of [place] in the Last Year: Not on file    Unstable Housing in the Last Year: Not on file Vitals:    01/04/22 1336   BP: (!) 140/72   Pulse: 60   Resp: 16   Temp: 96.9 °F (36.1 °C)   TempSrc: Temporal   SpO2: 97%   Weight: 226 lb (102.5 kg)   Height: 6' (1.829 m)       Physical Exam  Vitals and nursing note reviewed. Constitutional:       Appearance: He is well-developed. HENT:      Head: Atraumatic. Right Ear: External ear normal.      Left Ear: External ear normal.      Nose: Nose normal.      Mouth/Throat:      Pharynx: No oropharyngeal exudate. Eyes:      Conjunctiva/sclera: Conjunctivae normal.      Pupils: Pupils are equal, round, and reactive to light. Neck:      Thyroid: No thyromegaly. Trachea: No tracheal deviation. Cardiovascular:      Rate and Rhythm: Normal rate and regular rhythm. Heart sounds: No murmur heard. No friction rub. No gallop. Pulmonary:      Effort: Pulmonary effort is normal. No respiratory distress. Breath sounds: Normal breath sounds. Abdominal:      General: Bowel sounds are normal.      Palpations: Abdomen is soft. Musculoskeletal:         General: No tenderness or deformity. Normal range of motion. Cervical back: Normal range of motion and neck supple. Lymphadenopathy:      Cervical: No cervical adenopathy. Skin:     General: Skin is warm and dry. Capillary Refill: Capillary refill takes less than 2 seconds. Findings: No rash. Neurological:      Mental Status: He is alert and oriented to person, place, and time. Sensory: No sensory deficit. Motor: No abnormal muscle tone.       Coordination: Coordination normal.      Deep Tendon Reflexes: Reflexes normal.             Seen By:  Pennie Cutler DO

## 2022-01-06 ASSESSMENT — ENCOUNTER SYMPTOMS
VOMITING: 0
EYE REDNESS: 0
SINUS PAIN: 0
BLOOD IN STOOL: 0
SHORTNESS OF BREATH: 0
EYE DISCHARGE: 0
SORE THROAT: 0
PHOTOPHOBIA: 0
COUGH: 0
NAUSEA: 1
TROUBLE SWALLOWING: 0
ABDOMINAL PAIN: 0
ALLERGIC/IMMUNOLOGIC NEGATIVE: 1
CHEST TIGHTNESS: 0
EYE PAIN: 0
BACK PAIN: 0
DIARRHEA: 0

## 2022-01-10 ENCOUNTER — TELEPHONE (OUTPATIENT)
Dept: FAMILY MEDICINE CLINIC | Age: 72
End: 2022-01-10

## 2022-01-10 RX ORDER — ONDANSETRON 4 MG/1
4 TABLET, ORALLY DISINTEGRATING ORAL EVERY 8 HOURS PRN
Qty: 9 TABLET | Refills: 0 | Status: SHIPPED | OUTPATIENT
Start: 2022-01-10 | End: 2022-01-13

## 2022-01-10 RX ORDER — ONDANSETRON 4 MG/1
4 TABLET, ORALLY DISINTEGRATING ORAL EVERY 8 HOURS PRN
Qty: 9 TABLET | Refills: 0 | Status: SHIPPED
Start: 2022-01-10 | End: 2022-01-10 | Stop reason: SDUPTHER

## 2022-01-10 NOTE — TELEPHONE ENCOUNTER
Pt tested positive for COVID on 01/04/21. Symptoms staying the same - not worsening, not improving. Cough, congestion, fatigue nausea and vomiting. Pt denies SOB at this time. Advised that viruses need to run their course, can use OTC meds to manage symptoms. Seek care if SOB occurs. Pt would like something sent to pharmacy to help with nausea. [facility].

## 2022-01-10 NOTE — TELEPHONE ENCOUNTER
Original prescription said failed to send. I sent the prescription again. Verify with pharmacy before going to .

## 2022-02-03 ENCOUNTER — TELEPHONE (OUTPATIENT)
Dept: FAMILY MEDICINE CLINIC | Age: 72
End: 2022-02-03

## 2022-02-03 NOTE — TELEPHONE ENCOUNTER
Kristyn England states his right arm is very painful. His arm pain really worsened the past month. He fell years ago and broke it. He thinks he has a bad rotator cuff. He is struggling with fatigue, for a bout a year. It has really worsened in the past 6 months. States he canceled appt today due to weather. He would like your advisement on above complaints.

## 2022-02-03 NOTE — TELEPHONE ENCOUNTER
----- Message from Renny Earl sent at 2/3/2022  8:29 AM EST -----  Subject: Referral Request    QUESTIONS   Reason for referral request? knee pain   Has the physician seen you for this condition before? No   Preferred Specialist (if applicable)? Do you already have an appointment scheduled? No  Additional Information for Provider? Patient does \"not want to go to   [place]\" because he is unhappy with previous knee surgery they performed. His knee pain is worsening and keeping him up at night, in addition to his   rt shoulder pain.  ---------------------------------------------------------------------------  --------------  CALL BACK INFO  What is the best way for the office to contact you? OK to leave message on   voicemail  Preferred Call Back Phone Number?  [phone]

## 2022-02-15 ENCOUNTER — TELEPHONE (OUTPATIENT)
Dept: FAMILY MEDICINE CLINIC | Age: 72
End: 2022-02-15

## 2022-02-15 NOTE — TELEPHONE ENCOUNTER
Tried calling patient to reschedule appointment, no answer and no voicemail set up.  Can be scheduled in a provider leidy per PCP

## 2022-02-15 NOTE — TELEPHONE ENCOUNTER
----- Message from Janice Dc sent at 2/14/2022  3:44 PM EST -----  Subject: Message to Provider    QUESTIONS  Information for Provider? Pt sees Dr. Dianne Hamm & is taking a Flomax &   thought it would help with frequent urination but it is not. Pt also have   chest pains in right side of chest. Seen in ED 2/11 & told it's a pulled   muscle. Pt has appt 2/22 but would like to be worked in sooner if   possible.   ---------------------------------------------------------------------------  --------------  [address]  What is the best way for the office to contact you? Do not leave any   message, patient will call back for answer  Preferred Call Back Phone Number? [phone]  ---------------------------------------------------------------------------  --------------  SCRIPT ANSWERS  Relationship to Patient?  Self

## 2022-02-15 NOTE — TELEPHONE ENCOUNTER
Only thing open before appointment is Provider leidy.  Would you like him placed into one of them or to keep his original appointment

## 2022-03-09 ENCOUNTER — OFFICE VISIT (OUTPATIENT)
Dept: FAMILY MEDICINE CLINIC | Age: 72
End: 2022-03-09
Payer: MEDICARE

## 2022-03-09 VITALS
BODY MASS INDEX: 31.56 KG/M2 | DIASTOLIC BLOOD PRESSURE: 72 MMHG | HEIGHT: 72 IN | TEMPERATURE: 97.5 F | WEIGHT: 233 LBS | SYSTOLIC BLOOD PRESSURE: 136 MMHG | OXYGEN SATURATION: 96 % | HEART RATE: 75 BPM

## 2022-03-09 DIAGNOSIS — G89.29 CHRONIC RIGHT SHOULDER PAIN: ICD-10-CM

## 2022-03-09 DIAGNOSIS — N52.9 ERECTILE DYSFUNCTION, UNSPECIFIED ERECTILE DYSFUNCTION TYPE: ICD-10-CM

## 2022-03-09 DIAGNOSIS — M25.511 CHRONIC RIGHT SHOULDER PAIN: ICD-10-CM
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
## 2022-03-09 DIAGNOSIS — N40.0 ENLARGED PROSTATE: ICD-10-CM

## 2022-03-09 PROCEDURE — 99214 OFFICE O/P EST MOD 30 MIN: CPT | Performed by: FAMILY MEDICINE

## 2022-03-09 RX ORDER — SILDENAFIL CITRATE 20 MG/1
20-100 TABLET ORAL DAILY PRN
Qty: 20 TABLET | Refills: 2 | Status: SHIPPED | OUTPATIENT
Start: 2022-03-09

## 2022-03-09 ASSESSMENT — PATIENT HEALTH QUESTIONNAIRE - PHQ9
9. THOUGHTS THAT YOU WOULD BE BETTER OFF DEAD, OR OF HURTING YOURSELF: 0
3. TROUBLE FALLING OR STAYING ASLEEP: 0
SUM OF ALL RESPONSES TO PHQ QUESTIONS 1-9: 14
SUM OF ALL RESPONSES TO PHQ QUESTIONS 1-9: 14
1. LITTLE INTEREST OR PLEASURE IN DOING THINGS: 3
7. TROUBLE CONCENTRATING ON THINGS, SUCH AS READING THE NEWSPAPER OR WATCHING TELEVISION: 3
10. IF YOU CHECKED OFF ANY PROBLEMS, HOW DIFFICULT HAVE THESE PROBLEMS MADE IT FOR YOU TO DO YOUR WORK, TAKE CARE OF THINGS AT HOME, OR GET ALONG WITH OTHER PEOPLE: 1
SUM OF ALL RESPONSES TO PHQ9 QUESTIONS 1 & 2: 5
SUM OF ALL RESPONSES TO PHQ QUESTIONS 1-9: 14
2. FEELING DOWN, DEPRESSED OR HOPELESS: 2
4. FEELING TIRED OR HAVING LITTLE ENERGY: 3
6. FEELING BAD ABOUT YOURSELF - OR THAT YOU ARE A FAILURE OR HAVE LET YOURSELF OR YOUR FAMILY DOWN: 3
5. POOR APPETITE OR OVEREATING: 0
SUM OF ALL RESPONSES TO PHQ QUESTIONS 1-9: 14
8. MOVING OR SPEAKING SO SLOWLY THAT OTHER PEOPLE COULD HAVE NOTICED. OR THE OPPOSITE, BEING SO FIGETY OR RESTLESS THAT YOU HAVE BEEN MOVING AROUND A LOT MORE THAN USUAL: 0

## 2022-03-09 NOTE — PROGRESS NOTES
[facility]  Office Progress Note - Dr. Lisa Pitts  3/9/22    CC:   Chief Complaint   Patient presents with    Joint Pain     Mirna Bibles on ice a few weeks ago on his right side. Right shoulder and right side/ribs hurting him.  Polydipsia        /72 (Site: Right Upper Arm, Position: Sitting, Cuff Size: Large Adult)   Pulse 75   Temp 97.5 °F (36.4 °C) (Temporal)   Ht 6' (1.829 m)   Wt 233 lb (105.7 kg)   SpO2 96%   BMI 31.60 kg/m²   Wt Readings from Last 3 Encounters:   03/09/22 233 lb (105.7 kg)   01/04/22 226 lb (102.5 kg)   12/28/21 224 lb (101.6 kg)       HPI: seen June 2021. Missed or cancelled or rescheduled 4 appts since then. He was 13 minutes late to check in this morning and we had to change his visit from AWV to problem focused. Has bene to UC a few times - urinary urgency, Covid 19. Has followed with EPS a few times for pacemaker device checks, Hx of V tach - hes on quinidine and mexiletine to help maintain rhythm. Today says fell on ice a few weeks ago and having shoulder and rib pain. Has also been noting increased thrist.   When seen in UC with freq urination, his random glucose was 89. Med list says no longer taking flomax though this was Rx for BPH in the past.   He thought it was making him go more often - he found he was having significant nocturia. He says since stopping it he is not going as much - but he has had some dribbling now again. He has been using super beta prostate. Erectile dysfunction  Having difficulty getting and maintaining erections. Sexually active with his female partner. He would like to try medication for this. He has not had any recent chest pain and is not using nitrates. [facility] for physical therapy if we order  _________________________________________________________    Assessment / Yue Hylton was seen today for joint pain and polydipsia.     Diagnoses and all orders for this visit:    Fall, initial encounter  -     XR SHOULDER RIGHT (MIN 2 VIEWS); Future  -     XR KNEE LEFT (1-2 VIEWS); Future  -     XR KNEE RIGHT (1-2 VIEWS); Future  Right shoulder shows osteoarthritis of moderate severity at the [facility] joint and mild to moderate at the [facility] joint. Calcific tendinitis likely at the rotator cuff. Chronic right shoulder pain  I suspect rotator cuff pathology. He has decent range of motion but painful at the extremes. He is not able to fully abduct secondary to pain and some stiffness. Inverted can positive. Tender over the supraspinatus insertion. Could try an injection and I think I would like to get him started with physical therapy. Enlarged prostate  He has stopped Flomax and started super beta prostate. He is happy with this for now. We discussed that frequent nocturia is a sign that his prostate is enlarging and his bladder is not emptying well enough. Erectile dysfunction, unspecified erectile dysfunction type  -     sildenafil (REVATIO) 20 MG tablet; Take 1-5 tablets by mouth daily as needed (30 minutes before sexual activity.)    Return in about 3 months (around 6/9/2022). or as scheduled. Patient counseled to follow up sooner or seek more acute care if symptoms worsening or not improving according to plan. Electronically signed by Geraldine Lancaster MD on 3/9/2022    _________________________________________________________  Current Outpatient Medications on File Prior to Visit   Medication Sig Dispense Refill    ondansetron (ZOFRAN) 4 MG tablet Take 1 tablet by mouth every 8 hours as needed for Nausea or Vomiting 15 tablet 0    gabapentin (NEURONTIN) 300 MG capsule Take 1 capsule by mouth 3 times daily for 90 days.  270 capsule 0    mexiletine (MEXITIL) 250 MG capsule Take 1 capsule by mouth 3 times daily 90 capsule 11    quiNIDine gluconate 324 MG extended release tablet Take 1 tablet by mouth 3 times daily 90 tablet 5    metoprolol tartrate (LOPRESSOR) 25 MG tablet Take 50 mg by mouth 2 times daily Take with Lopressor 100mg BID. Totally 150mg BID      escitalopram (LEXAPRO) 10 MG tablet TAKE ONE TABLET BY MOUTH IN THE EVENING WITH DINNER FOR DEPRESSION AND ANXIETY      fluticasone (FLONASE ALLERGY RELIEF) 50 MCG/ACT nasal spray 1 spray by Each Nostril route daily 1 Bottle 1    busPIRone (BUSPAR) 10 MG tablet Take 1 tablet by mouth 3 times daily 90 tablet 2    omeprazole (PRILOSEC) 20 MG delayed release capsule Take 1 capsule by mouth Daily 30 capsule 2    lisinopril (PRINIVIL;ZESTRIL) 30 MG tablet Take 1 tablet by mouth daily For blood pressure. 90 tablet 1    HYDROcodone-acetaminophen (NORCO)  MG per tablet TAKE ONE TABLET FOUR TIMES DAILY AS NEEDED FOR PAIN      metoprolol tartrate (LOPRESSOR) 100 MG tablet Take 1 tablet by mouth 2 times daily (Patient taking differently: Take 100 mg by mouth 2 times daily Take with 50mg BID. Totally 150mg BID) 60 tablet 3    aspirin (ASPIRIN 81) 81 MG chewable tablet Take by mouth daily        No current facility-administered medications on file prior to visit. Patient Active Problem List   Diagnosis Code    PTSD (post-traumatic stress disorder) F43.10    Depression F32. A    Pacemaker Z95.0    HTN (hypertension) I10    Spinal stenosis at L4-L5 level M48.061    Depression, major, recurrent, moderate (HCC) F33.1    Ventricular tachycardia (HCC) I47.2    ICD (implantable cardioverter-defibrillator) battery depletion Z45.02     _________________________________________________________  Past Medical History:   Diagnosis Date    Cerebral artery occlusion with cerebral infarction ([facility]     Chronic back pain     Depression     Hyperlipidemia     Hypertension     Pacemaker     Sleep apnea     TIA (transient ischemic attack)     Ventricular tachycardia ([facility]        Family History   Problem Relation Age of Onset    High Blood Pressure Father     Diabetes Sister     Heart Disease Brother     Diabetes Sister     Diabetes Sister        Past Surgical History:   Procedure Laterality Date    BACK SURGERY      CARDIAC CATHETERIZATION  09/15/2020    Dr. Rowe Sports      UPGRADE PPM TO DUAL ICD    ([employer])   DR. Hermes Aldridge     CARDIAC PACEMAKER PLACEMENT      CHOLECYSTECTOMY      EYE SURGERY Left     FOOT SURGERY      VENTRICULOPERITONEAL SHUNT         Social History     Tobacco Use    Smoking status: Former Smoker     Packs/day: 1.00     Years: 1.00     Pack years: 1.00     Types: Cigarettes     Quit date:      Years since quittin.2    Smokeless tobacco: Never Used    Tobacco comment: quit 30 years   Vaping Use    Vaping Use: Never used   Substance Use Topics    Alcohol use: Not Currently     Comment: rare    Drug use: No       Chart reviewed and updated where appropriate for PMH, Fam, and Soc Hx.  _________________________________________________________  ROS: POSITIVE: As in the HPI. Otherwise Pertinent negatives are negative.    __________________________________________________________  Physical Exam   Constitutional:    He is oriented to person, place, and time. He appears well-developed and well-nourished. Eyes:    Conjunctivae are normal.    Pupils are equal, round, and reactive to light. EOMI. Neck:    Normal range of motion. No thyromegaly or nodules noted. No bruit. No LAD. Cardiovascular:    Normal rate, regular rhythm and normal heart sounds. No murmur. No gallop and no friction rub. Pulmonary/Chest:    Effort normal and breath sounds normal.    No wheezes. No rales or rhonchi. Abdominal:    Soft. Bowel sounds are normal.    No distension. No tenderness. Musculoskeletal:    Normal range of motion. Positive inverted can. Tender to palpation mildly over the [facility] joint and more so over the anterior shoulder/supraspinatus insertion. No joint swelling noted. No peripheral edema. Neurological:    He is A&Ox3.    Motor and sensation grossly intact. Normal Gait. Decreased strength at the right shoulder secondary to pain. Skin:    Skin is warm and dry. No rashes, lesions. Psychiatric:    He has a anxious mood and affect. Normal groom and dress. No SI or HI.   ________________________________________________________    This note may have been created using dictation software.  Efforts were made to reduce errors, but some may persist.

## 2022-03-09 NOTE — PATIENT INSTRUCTIONS

## 2022-03-14 ENCOUNTER — TELEPHONE (OUTPATIENT)
Dept: FAMILY MEDICINE CLINIC | Age: 72
End: 2022-03-14

## 2022-03-14 DIAGNOSIS — R82.90 FOUL SMELLING URINE: Primary | ICD-10-CM

## 2022-03-14 NOTE — TELEPHONE ENCOUNTER
----- Message from Amanda Bautista sent at 3/14/2022 11:15 AM EDT -----  Subject: Message to Provider    QUESTIONS  Information for Provider? Patient thinks he has an UTI again and is   wanting an antibiotic called in please. Thanks  ---------------------------------------------------------------------------  --------------  Yasmin JESUS  What is the best way for the office to contact you? Do not leave any   message, patient will call back for answer  Preferred Call Back Phone Number? [phone]  ---------------------------------------------------------------------------  --------------  SCRIPT ANSWERS  Relationship to Patient?  Self

## 2022-03-14 NOTE — TELEPHONE ENCOUNTER
Patient called in the office regarding a urination problem. Patient stated that his urine has a very strong smell, and is dark yellow. Patient stated that he is not having an pain, no burning with urination, and no frequent urination either. Patient was wondering if Dr. Lisa Pitts could send him in a prescription or does he need to be evaluated? Please advise.

## 2022-03-14 NOTE — TELEPHONE ENCOUNTER
Advise patient to give urine sample at the lab - could be [facility] or [facility] office I suspect. Lab order placed.

## 2022-03-15 DIAGNOSIS — R82.90 FOUL SMELLING URINE: ICD-10-CM

## 2022-03-15 LAB
BILIRUBIN URINE: NEGATIVE
BLOOD, URINE: NEGATIVE
CLARITY: CLEAR
COLOR: YELLOW
GLUCOSE URINE: NEGATIVE MG/DL
KETONES, URINE: NEGATIVE MG/DL
LEUKOCYTE ESTERASE, URINE: NEGATIVE
NITRITE, URINE: NEGATIVE
PH UA: 5.5 (ref 5–9)
PROTEIN UA: NEGATIVE MG/DL
SPECIFIC GRAVITY UA: 1.01 (ref 1–1.03)
UROBILINOGEN, URINE: 0.2 E.U./DL

## 2022-03-18 LAB — URINE CULTURE, ROUTINE: NORMAL

## 2022-03-18 NOTE — PROGRESS NOTES
2 times daily Take with Lopressor 100mg BID. Totally 150mg BID      escitalopram (LEXAPRO) 10 MG tablet TAKE ONE TABLET BY MOUTH IN THE EVENING WITH DINNER FOR DEPRESSION AND ANXIETY      fluticasone (FLONASE ALLERGY RELIEF) 50 MCG/ACT nasal spray 1 spray by Each Nostril route daily 1 Bottle 1    busPIRone (BUSPAR) 10 MG tablet Take 1 tablet by mouth 3 times daily 90 tablet 2    omeprazole (PRILOSEC) 20 MG delayed release capsule Take 1 capsule by mouth Daily 30 capsule 2    lisinopril (PRINIVIL;ZESTRIL) 30 MG tablet Take 1 tablet by mouth daily For blood pressure. 90 tablet 1    HYDROcodone-acetaminophen (NORCO)  MG per tablet TAKE ONE TABLET FOUR TIMES DAILY AS NEEDED FOR PAIN      metoprolol tartrate (LOPRESSOR) 100 MG tablet Take 1 tablet by mouth 2 times daily (Patient taking differently: Take 100 mg by mouth 2 times daily Take with 50mg BID. Totally 150mg BID) 60 tablet 3    aspirin (ASPIRIN 81) 81 MG chewable tablet Take by mouth daily        No current facility-administered medications for this visit. Allergies   Allergen Reactions    Bee Venom     Naproxen      Patient states he takes aleve and is not allergic to it     Meloxicam Nausea And Vomiting     ROS:   Constitutional: Negative for fever, activity change and appetite change. HENT: Negative for epistaxis. Eyes: Negative for diploplia, blurred vision. Respiratory: Negative for cough. Positive for chest tightness and hortness of breath. Negative of wheezing. Cardiovascular: pertinent positives in HPI  Gastrointestinal: Negative for abdominal pain and blood in stool. All other review of systems are negative     PHYSICAL EXAM:  Vitals:    03/22/22 1114   BP: 122/82   Pulse: 60   Resp: 20   Weight: 232 lb 6.4 oz (105.4 kg)   Height: 6' (1.829 m)     Constitutional: Well-developed, no acute distress  Eyes: conjunctivae normal, no xanthelasma   Ears, Nose, Throat: oral mucosa moist, no cyanosis   CV: no JVD.  Regular rate and rhythm. Normal S1S2 and no S3. No murmurs, rubs, or gallops. PMI is nondisplaced  Lungs: clear to auscultation bilaterally, normal respiratory effort without used of accessory muscles  Abdomen: soft, non-tender, bowel sounds present, no masses or hepatomegaly   Musculoskeletal: no digital clubbing, no edema   Skin: warm, no rashes  Device site: well healed. No erosion, infection or migration. Pertinent Cardiac Testing:     Echocardiogram 4/28/20:     Stress Test 4/28/20:       Coronary angiogram 09/15/20: Left Main: Long.  Distal diffuse 30% stenosis. LAD: Moderately calcified.  Tortuous.  Mid eccentric diffuse 40  to 50% stenosis. D1: Mid bifurcating diffuse 80% stenosis.  This is less than a 2  mm vessel at this location. Cx: Severely tortuous.  Proximal diffuse 20 to 30%.  Mid diffuse 30 to 40%.  Diffuse mild luminal irregularities. OM1: Tiny vessel. OM2: No angiographically significant stenosis. RCA: Dominant.  Severely tortuous.  Distal mild diffuse luminal irregularities. PDA: Small vessel. PLB: No angiographically significant stenosis. ECG 3/22/2022: AsVs with RBBB and LAFB rate 60 bpm  ms, QTc 474 ms     Device Interrogation: 3/22/22  Make/Model [employer] Vigilant EL ICD  Mode DDDR 60/110  Battery Voltage/Longevity:  11.5 years    Pacing: A: 87%  RV: 18%    P wave: 1.3 mV  Impedance: 366 ohms   Threshold: 0.9 V @ 0.4 ms  RV R wave: 9.0 mV  Impedance: 330 ohms   Threshold: 0.8 V @ 0.4 ms  Episodes: VT only 1 EGM 1:1 all on 3/1/2022  Reprogramming included: adjusted rate response from 8 to 10 due to flat histograms. Overall device function is normal    All device programmable settings were evaluated per above and in the scanned document, along with iterative adjustments (capture thresholds) to assess and select the most appropriate final programming to provide for consistent delivery of the appropriate therapy and to verify function of the device. Impression:    1.  Sustained ventricular tachycardia  - Documented up to 4 minutes on pacemaker interrogation.  - Echocardiogram 4/28/20 showed LV EF of 55-60%. - Nuclear stress test 4/28/20 showed no ischemia.  - LHC 09/15/20 required no intervention   - Status post ICD upgrade 9/23/20  - Started on Amiodarone 10/26/20 due to recurrent VT required ATPs and ICD shocks and discontinued 12/23/20 due to rash. - Started on Mexiletine 12/23/20.  - Still having breakthrough VT required ATPs lasted on 4/13/21.  - Amiodarone restarted 4/14/21 and stopped 5/7/21 due to hives  - Mexiletine dose increase to 250 mg tid on 5/7/21.  - Deferred Sotalol or Tikosyn admission.  - Started on Quinidine 9/28/21.  - On Lopressor.  - No recurrence on today interrogation. 2. Supraventricular tachycardia  - On Lopressor. 3. ICD in situ  - Pacemaker implanted in March if 2014  - Indication high grade AV block. - Pacemake upgrated to ICD 09/23/20 due to sustained VT.  - Normal device function.     4. High grade AV block   - Status post pacemaker implantation in March of 2014      5. Coronary artery disease  - Left Main: Long.  Distal diffuse 30% stenosis. LAD: Moderately calcified.  Tortuous.  Mid eccentric diffuse 40  to 50% stenosis. D1: Mid bifurcating diffuse 80% stenosis.  This is less than a 2  mm vessel at this location. Cx: Severely tortuous.  Proximal diffuse 20 to 30%.  Mid diffuse 30 to 40%.  Diffuse mild luminal irregularities. OM1: Tiny vessel. OM2: No angiographically significant stenosis. RCA: Dominant.  Severely tortuous.  Distal mild diffuse luminal irregularities. PDA: Small vessel. PLB: No angiographically significant stenosis. 6. Hypertension  - Well controlled at this office visit. - On Lopressor and Zestril.     7. Hydrocephalus     8. RBBB  - Chronic. 9. BPH    Recommendations:    1. Continue Quinidine 324 mg every 8 hours. 2. Continue Mexiletine 250 mg every 8 hours. 3. Continue Lopressor 150 mg bid.   4. Remote monitoring in 3 months  5. Obtain EKG and CMP every 3 to 6 months. 6. Follow up in 6 months or sooner PRN. Encouraged the patient to call the office for any questions or concerns. Thank you for allowing me to participate in your patient's care. I have spent a total of 40 minutes with the patient and the family reviewing the above stated recommendations. And a total of >50% of that time involved face-to-face time providing counseling and or coordination of care with the other providers, preparation for the clinic visit, reviewing records/tests, counseling/education of the patient, ordering medications/tests/procedures, coordinating care, and documenting clinical information in the EHR.      Carmen Torrez MD  Cardiac Electrophysiology  [address]   [facility] [place]: [facility]  3/22/22   11:34 AM

## 2022-03-22 ENCOUNTER — OFFICE VISIT (OUTPATIENT)
Dept: NON INVASIVE DIAGNOSTICS | Age: 72
End: 2022-03-22
Payer: MEDICARE

## 2022-03-22 VITALS
DIASTOLIC BLOOD PRESSURE: 82 MMHG | HEIGHT: 72 IN | HEART RATE: 60 BPM | WEIGHT: 232.4 LBS | RESPIRATION RATE: 20 BRPM | BODY MASS INDEX: 31.48 KG/M2 | SYSTOLIC BLOOD PRESSURE: 122 MMHG

## 2022-03-22 DIAGNOSIS — Z95.810 ICD (IMPLANTABLE CARDIOVERTER-DEFIBRILLATOR), DUAL, IN SITU: Primary | ICD-10-CM

## 2022-03-22 DIAGNOSIS — I47.20 VENTRICULAR TACHYCARDIA: ICD-10-CM

## 2022-03-22 PROCEDURE — 99215 OFFICE O/P EST HI 40 MIN: CPT | Performed by: INTERNAL MEDICINE

## 2022-04-24 DIAGNOSIS — I10 ESSENTIAL HYPERTENSION: ICD-10-CM

## 2022-04-25 ENCOUNTER — TELEPHONE (OUTPATIENT)
Dept: FAMILY MEDICINE CLINIC | Age: 72
End: 2022-04-25

## 2022-04-25 NOTE — TELEPHONE ENCOUNTER
Last Appointment:  3/9/2022  Future Appointments   Date Time Provider Torey Scanlon   6/10/2022  9:00 AM Altagracia Valencia  [address]

## 2022-04-25 NOTE — TELEPHONE ENCOUNTER
Most recent chart notes show a work up on his right shoulder with possible injection indicated. I did not see where the knees were being worked up. Pt called in wanting 05/17 appt moved up sooner. I advised that Dr. Jami Goodwin is out of office this week. We will let him know if we can move this up/do injections in knees AND shoulder. Will have Dr. Jami Goodwin address upon his return. IF Dr. Jami Goodwin is agreeable to injections, will likely want scheduled at end of day.

## 2022-04-25 NOTE — TELEPHONE ENCOUNTER
Injection was for shoulder only. Knee X-rays were only done so that they would be available for an orthopaedic surgery consult IF he followed through on that with some doc in [facility] he was interested in. Hes had at least one of his knees replaced - we dont inject those.

## 2022-04-25 NOTE — TELEPHONE ENCOUNTER
Spoke with Malvina Gosselin, got appointment scheduled for Tuesday 5/17; but, he only wants to go ahead with the shots if we have the freezing spray. Patient states he talked to Dr. Lizarraga Client about this the last time he was in. Are you able to confirm if we do have the spray so he can move forward with the shots?

## 2022-04-25 NOTE — TELEPHONE ENCOUNTER
It appears his lisinopril may have been increased to 30 mg last June  Please see what dose he has been taking

## 2022-04-25 NOTE — TELEPHONE ENCOUNTER
----- Message from Kailyn Rodriguez sent at 4/23/2022 10:05 AM EDT -----  Subject: Appointment Request    Reason for Call: Urgent Joint Pain    QUESTIONS  Type of Appointment? Established Patient  Reason for appointment request? No appointments available during search  Additional Information for Provider? Patient is having pain in both knees   and are swollen at the joint. Would like to get a cortisone shot in both   knees if possible. Was suppose to do it at last visit but office didn't   have the freeze spray. Please call and schedule  ---------------------------------------------------------------------------  --------------  CALL BACK INFO  What is the best way for the office to contact you? OK to leave message on   voicemail  Preferred Call Back Phone Number? [phone]  ---------------------------------------------------------------------------  --------------  SCRIPT ANSWERS  Relationship to Patient? Self  Did you have an injury or trauma within the past 3 days? No  Is your joint red or swollen? Yes  Have you been diagnosed with, awaiting test results for, or told that you   are suspected of having COVID-19 (Coronavirus)? (If patient has tested   negative or was tested as a requirement for work, school, or travel and   not based on symptoms, answer no)? No  Within the past 10 days have you developed any of the following symptoms   (answer no if symptoms have been present longer than 10 days or began   more than 10 days ago)? Fever or Chills, Cough, Shortness of breath or   difficulty breathing, Loss of taste or smell, Sore throat, Nasal   congestion, Sneezing or runny nose, Fatigue or generalized body aches   (answer no if pain is specific to a body part e.g. back pain), Diarrhea,   Headache? No  Have you had close contact with someone with COVID-19 in the last 7 days? No  (Service Expert  click yes below to proceed with ChromaDex As Usual   Scheduling)?  Yes

## 2022-04-29 NOTE — TELEPHONE ENCOUNTER
Pt advised no knee injection will be done. Scheduled for shoulder injection, last appt of day. Pt advised of time change.

## 2022-04-29 NOTE — TELEPHONE ENCOUNTER
Pt confirms this goes to his local pharmacy (some of his meds go through [place]) and is currently 30 mg daily.

## 2022-04-30 DIAGNOSIS — I10 ESSENTIAL HYPERTENSION: ICD-10-CM

## 2022-04-30 RX ORDER — FLUTICASONE PROPIONATE 50 MCG
SPRAY, SUSPENSION (ML) NASAL
Qty: 16 G | Refills: 1 | OUTPATIENT
Start: 2022-04-30

## 2022-04-30 RX ORDER — LISINOPRIL 30 MG/1
30 TABLET ORAL DAILY
Qty: 90 TABLET | Refills: 1 | Status: SHIPPED
Start: 2022-04-30 | End: 2022-07-05 | Stop reason: ALTCHOICE

## 2022-04-30 RX ORDER — LISINOPRIL 20 MG/1
TABLET ORAL
Qty: 90 TABLET | Refills: 1 | OUTPATIENT
Start: 2022-04-30

## 2022-05-19 ENCOUNTER — TELEPHONE (OUTPATIENT)
Dept: NON INVASIVE DIAGNOSTICS | Age: 72
End: 2022-05-19

## 2022-05-19 NOTE — TELEPHONE ENCOUNTER
I returned patient's call. He is presently at [facility] ED because he received 5 shocks from his ICD. I asked patient to notify the ED he is a patient of [facility]. I told Ana Rankin [facility]. He said he will tell the staff at [facility].     Cari Richardson RN, BSN  [facility]

## 2022-05-30 LAB
LEFT VENTRICULAR EJECTION FRACTION HIGH VALUE: 60 %
LV EF: 55 %

## 2022-06-02 ENCOUNTER — TELEPHONE (OUTPATIENT)
Dept: FAMILY MEDICINE CLINIC | Age: 72
End: 2022-06-02

## 2022-06-02 NOTE — TELEPHONE ENCOUNTER
[facility]            [phone]       She is asking if you will follow orders for this patient discharging from [facility] tomorrow? He was there for open heart surgery. The surgeon will follow orders for anything related to the surgery. But they have orders for Skilled nursing, [address]

## 2022-06-06 ENCOUNTER — TELEPHONE (OUTPATIENT)
Dept: NON INVASIVE DIAGNOSTICS | Age: 72
End: 2022-06-06

## 2022-06-06 NOTE — TELEPHONE ENCOUNTER
I sent the HeartLogic report from 6/6/22 to Dr. Aaron Granado at [facility]. The HeartLogic was elevated at 32 since 6/5/22.     Arie Denver RN, BSN  [facility]

## 2022-06-09 ENCOUNTER — TELEPHONE (OUTPATIENT)
Dept: NON INVASIVE DIAGNOSTICS | Age: 72
End: 2022-06-09

## 2022-06-09 NOTE — TELEPHONE ENCOUNTER
Patient called me back. He was discharged from [facility] on Monday, 6/6/22. Patient informed me he had triple bypass while in [place]. I asked how he was feeling yesterday. He was a bit confused of timing as he thought he was just discharged yesterday. His wife confirmed the timeline for me. Mrs. Igor Goodwin stated the home health care nurse was out to see Calista Meraz on Monday. The [place] [address] nurse told Mrs. Beltrán to hold the Quinidine on Tuesday, 6/8/22. I informed Mrs. Beltrán of the irregular heart rhythms noted yesterday. She said Calista Meraz was doing okay yesterday. The visiting nurse is supposed to be at the home today. I asked Mrs. Beltrán to have the nurse call the office so I can verify medications. Mrs. Igor Goodwin read the pill bottles for me: Mexitil 200 mg tid, Metoprolol Succinate 25 mg 0.5 tablet daily, Furosemide 20 mg daily, KCL ER 20 meq daily, Quinidine 325 mg q8h.     Edward Weber RN, BSN  [facility]

## 2022-06-09 NOTE — TELEPHONE ENCOUNTER
----- Message from Aly Fonseca MD sent at 6/9/2022 10:59 AM EDT -----  Please ask him to increase Toprol XL to 25 mg daily and stay on Mexiletine 200 mg tid and Quinidine 325 mg tid. Does he have any recent EKG if not please get one. Thanks.   ----- Message -----  From: Leon Gaines RN  Sent: 6/9/2022   9:12 AM EDT  To: Aly Fonseca MD    Murj report    Non-sustained Ventricular Tachycardia  1  30 NSVT & VT- No Therapy episodes also detected on Jun 08, 2022  Average V rates 102 - 172 bpm  EGMs are c/w frequent PVCs and runs of NSVT  Created By: Xavier Bosworth 06/09/2022 07:21  Tachycardia: Paroxysmal AT  1  10 Atrial High Rate episodes detected since 3/30/2022  longest duration 25s  1 EGM available for review c/w 4s PAT w/ RVR    Tachycardia: VT  1  30 episodes binned as VT treated with ATP were detected on Jun 08, 2022 between 12:03 - 14:56  Average V rates ~134 - 167 bpm  9 EGMs available for review  EGMs appear c/w SR & Atrial Bigeminy with PVCs/Ventricular Ectopy w/ multiple 3-4s runs of NSVT meeting detection criteria and treated w burst ATP x 1 which terminated the arrhythmia  Log indicates 1 episode was treated w/ ATP x 2- no EGM  Additional Notes:  I tried calling the patient to see how he is doing this morning. There is no answer at his home and no voicemail available. I left a message on his secondary resource, [facility]. I asked that she call me back.  I sent a request to get records from [facility] for his stay in May 2022

## 2022-06-09 NOTE — TELEPHONE ENCOUNTER
I tried calling the patient regarding remote [employer] report. There was no answer at his home. I called his secondary number, Nicole [facility]hector and left a message for her to call the office.     Sharyle Cong RN, BSN  [facility]

## 2022-06-09 NOTE — TELEPHONE ENCOUNTER
I called the patient back and spoke with his wife. Per Dr. Sagar Sy I notified Mrs. Yodit Delgado needs to take Toprol XL 25 mg, one tablet daily, stay on Mexiletine 200 mg tid and Quinidine  325 mg tid. She repeated the above back to me. Concepción Otero has OT and another home health visitor tomorrow. They can come into the office on Monday, 6/13/22 for an EKG. I scheduled the EKG for 1120.     Aníbal Goss RN, BSN  [facility]

## 2022-06-14 DIAGNOSIS — I47.20 VENTRICULAR TACHYCARDIA: ICD-10-CM

## 2022-06-14 DIAGNOSIS — I47.20 VENTRICULAR TACHYCARDIA: Primary | ICD-10-CM

## 2022-06-14 LAB
ALBUMIN SERPL-MCNC: 3 G/DL
ALBUMIN SERPL-MCNC: 3.1 G/DL
ALBUMIN SERPL-MCNC: 3.3 G/DL
ALBUMIN SERPL-MCNC: 3.4 G/DL
ALBUMIN SERPL-MCNC: 3.5 G/DL
ALBUMIN SERPL-MCNC: NORMAL G/DL
ALP BLD-CCNC: 27 U/L
ALP BLD-CCNC: 35 U/L
ALP BLD-CCNC: 38 U/L
ALP BLD-CCNC: 46 U/L
ALP BLD-CCNC: 48 U/L
ALP BLD-CCNC: NORMAL U/L
ALT SERPL-CCNC: 33 U/L
ALT SERPL-CCNC: 46 U/L
ALT SERPL-CCNC: 50 U/L
ALT SERPL-CCNC: 66 U/L
ALT SERPL-CCNC: 66 U/L
ALT SERPL-CCNC: NORMAL U/L
ANION GAP SERPL CALCULATED.3IONS-SCNC: 1 MMOL/L
ANION GAP SERPL CALCULATED.3IONS-SCNC: 1 MMOL/L
ANION GAP SERPL CALCULATED.3IONS-SCNC: 1.1 MMOL/L
ANION GAP SERPL CALCULATED.3IONS-SCNC: 1.2 MMOL/L
ANION GAP SERPL CALCULATED.3IONS-SCNC: 1.8 MMOL/L
ANION GAP SERPL CALCULATED.3IONS-SCNC: NORMAL MMOL/L
AST SERPL-CCNC: 113 U/L
AST SERPL-CCNC: 116 U/L
AST SERPL-CCNC: 39 U/L
AST SERPL-CCNC: 44 U/L
AST SERPL-CCNC: 57 U/L
AST SERPL-CCNC: NORMAL U/L
AVERAGE GLUCOSE: NORMAL
BASOPHILS ABSOLUTE: 0 /ΜL
BASOPHILS ABSOLUTE: 0.1 /ΜL
BASOPHILS ABSOLUTE: ABNORMAL
BASOPHILS RELATIVE PERCENT: 0.1 %
BASOPHILS RELATIVE PERCENT: 0.2 %
BASOPHILS RELATIVE PERCENT: 0.2 %
BASOPHILS RELATIVE PERCENT: 0.3 %
BASOPHILS RELATIVE PERCENT: 0.4 %
BASOPHILS RELATIVE PERCENT: 0.5 %
BASOPHILS RELATIVE PERCENT: 0.7 %
BASOPHILS RELATIVE PERCENT: 0.8 %
BASOPHILS RELATIVE PERCENT: 0.9 %
BASOPHILS RELATIVE PERCENT: 0.9 %
BASOPHILS RELATIVE PERCENT: ABNORMAL
BILIRUB SERPL-MCNC: 0.5 MG/DL (ref 0.1–1.4)
BILIRUB SERPL-MCNC: 0.6 MG/DL (ref 0.1–1.4)
BILIRUB SERPL-MCNC: 0.7 MG/DL (ref 0.1–1.4)
BILIRUB SERPL-MCNC: NORMAL MG/DL
BUN BLDV-MCNC: 14 MG/DL
BUN BLDV-MCNC: 16 MG/DL
BUN BLDV-MCNC: 17 MG/DL
BUN BLDV-MCNC: 18 MG/DL
BUN BLDV-MCNC: 18 MG/DL
BUN BLDV-MCNC: 21.7 MG/DL
BUN BLDV-MCNC: 23 MG/DL
BUN BLDV-MCNC: 25 MG/DL
BUN BLDV-MCNC: 27 MG/DL
BUN BLDV-MCNC: 29 MG/DL
BUN BLDV-MCNC: 33 MG/DL
BUN BLDV-MCNC: 35 MG/DL
BUN BLDV-MCNC: 36 MG/DL
BUN BLDV-MCNC: 39 MG/DL
BUN BLDV-MCNC: NORMAL MG/DL
CALCIUM SERPL-MCNC: 8.3 MG/DL
CALCIUM SERPL-MCNC: 8.5 MG/DL
CALCIUM SERPL-MCNC: 8.5 MG/DL
CALCIUM SERPL-MCNC: 8.6 MG/DL
CALCIUM SERPL-MCNC: 8.7 MG/DL
CALCIUM SERPL-MCNC: 9 MG/DL
CALCIUM SERPL-MCNC: 9.1 MG/DL
CALCIUM SERPL-MCNC: 9.1 MG/DL
CALCIUM SERPL-MCNC: 9.4 MG/DL
CALCIUM SERPL-MCNC: 9.5 MG/DL
CALCIUM SERPL-MCNC: 9.5 MG/DL
CALCIUM SERPL-MCNC: 98 MG/DL
CALCIUM SERPL-MCNC: NORMAL MG/DL
CHLORIDE BLD-SCNC: 103 MMOL/L
CHLORIDE BLD-SCNC: 104 MMOL/L
CHLORIDE BLD-SCNC: 104 MMOL/L
CHLORIDE BLD-SCNC: 106 MMOL/L
CHLORIDE BLD-SCNC: 106 MMOL/L
CHLORIDE BLD-SCNC: 109 MMOL/L
CHLORIDE BLD-SCNC: 110 MMOL/L
CHLORIDE BLD-SCNC: 110 MMOL/L
CHLORIDE BLD-SCNC: 111 MMOL/L
CHLORIDE BLD-SCNC: 112 MMOL/L
CHLORIDE BLD-SCNC: 114 MMOL/L
CHLORIDE BLD-SCNC: NORMAL MMOL/L
CHOLESTEROL, TOTAL: 180 MG/DL
CHOLESTEROL/HDL RATIO: ABNORMAL
CO2: 21 MMOL/L
CO2: 21 MMOL/L
CO2: 23 MMOL/L
CO2: 24 MMOL/L
CO2: 25 MMOL/L
CO2: 25 MMOL/L
CO2: 26 MMOL/L
CO2: 26 MMOL/L
CO2: NORMAL
CREAT SERPL-MCNC: 1.15 MG/DL
CREAT SERPL-MCNC: 1.17 MG/DL
CREAT SERPL-MCNC: 1.2 MG/DL
CREAT SERPL-MCNC: 1.22 MG/DL
CREAT SERPL-MCNC: 1.23 MG/DL
CREAT SERPL-MCNC: 1.25 MG/DL
CREAT SERPL-MCNC: 1.3 MG/DL
CREAT SERPL-MCNC: 1.32 MG/DL
CREAT SERPL-MCNC: 1.48 MG/DL
CREAT SERPL-MCNC: 1.69 MG/DL
CREAT SERPL-MCNC: 1.71 MG/DL
CREAT SERPL-MCNC: 1.72 MG/DL
CREAT SERPL-MCNC: 1.73 MG/DL
CREAT SERPL-MCNC: 17.8 MG/DL
CREAT SERPL-MCNC: NORMAL MG/DL
EOSINOPHILS ABSOLUTE: 0 /ΜL
EOSINOPHILS ABSOLUTE: 0.1 /ΜL
EOSINOPHILS ABSOLUTE: 0.2 /ΜL
EOSINOPHILS ABSOLUTE: ABNORMAL
EOSINOPHILS RELATIVE PERCENT: 0.3 %
EOSINOPHILS RELATIVE PERCENT: 0.5 %
EOSINOPHILS RELATIVE PERCENT: 1 %
EOSINOPHILS RELATIVE PERCENT: 1.3 %
EOSINOPHILS RELATIVE PERCENT: 1.4 %
EOSINOPHILS RELATIVE PERCENT: 1.5 %
EOSINOPHILS RELATIVE PERCENT: 1.7 %
EOSINOPHILS RELATIVE PERCENT: 1.8 %
EOSINOPHILS RELATIVE PERCENT: 1.9 %
EOSINOPHILS RELATIVE PERCENT: 2 %
EOSINOPHILS RELATIVE PERCENT: ABNORMAL
GFR CALCULATED: NORMAL
GLUCOSE BLD-MCNC: 105 MG/DL
GLUCOSE BLD-MCNC: 111 MG/DL
GLUCOSE BLD-MCNC: 111 MG/DL
GLUCOSE BLD-MCNC: 112 MG/DL
GLUCOSE BLD-MCNC: 117 MG/DL
GLUCOSE BLD-MCNC: 118 MG/DL
GLUCOSE BLD-MCNC: 120 MG/DL
GLUCOSE BLD-MCNC: 133 MG/DL
GLUCOSE BLD-MCNC: 134 MG/DL
GLUCOSE BLD-MCNC: 135 MG/DL
GLUCOSE BLD-MCNC: 91 MG/DL
GLUCOSE BLD-MCNC: 92 MG/DL
GLUCOSE BLD-MCNC: 94 MG/DL
GLUCOSE BLD-MCNC: 96 MG/DL
GLUCOSE BLD-MCNC: 98 MG/DL
GLUCOSE BLD-MCNC: 99 MG/DL
GLUCOSE BLD-MCNC: NORMAL MG/DL
HBA1C MFR BLD: 5.6 %
HCT VFR BLD CALC: 12.5 % (ref 41–53)
HCT VFR BLD CALC: 21.7 % (ref 41–53)
HCT VFR BLD CALC: 22.3 % (ref 41–53)
HCT VFR BLD CALC: 23.9 % (ref 41–53)
HCT VFR BLD CALC: 23.9 % (ref 41–53)
HCT VFR BLD CALC: 28.1 % (ref 41–53)
HCT VFR BLD CALC: 36 % (ref 41–53)
HCT VFR BLD CALC: 36.7 % (ref 41–53)
HCT VFR BLD CALC: 36.7 % (ref 41–53)
HCT VFR BLD CALC: 37.4 % (ref 41–53)
HCT VFR BLD CALC: 37.4 % (ref 41–53)
HCT VFR BLD CALC: 37.5 % (ref 41–53)
HCT VFR BLD CALC: NORMAL %
HDLC SERPL-MCNC: 29 MG/DL (ref 35–70)
HEMOGLOBIN: 12.4 G/DL (ref 13.5–17.5)
HEMOGLOBIN: 12.8 G/DL (ref 13.5–17.5)
HEMOGLOBIN: 12.9 G/DL (ref 13.5–17.5)
HEMOGLOBIN: 12.9 G/DL (ref 13.5–17.5)
HEMOGLOBIN: 13 G/DL (ref 13.5–17.5)
HEMOGLOBIN: 13 G/DL (ref 13.5–17.5)
HEMOGLOBIN: 4.25 G/DL (ref 13.5–17.5)
HEMOGLOBIN: 7.6 G/DL (ref 13.5–17.5)
HEMOGLOBIN: 7.7 G/DL (ref 13.5–17.5)
HEMOGLOBIN: 8.5 G/DL (ref 13.5–17.5)
HEMOGLOBIN: 8.5 G/DL (ref 13.5–17.5)
HEMOGLOBIN: 9.7 G/DL (ref 13.5–17.5)
HEMOGLOBIN: NORMAL
INR BLD: 1
INR BLD: 1.2
LDL CHOLESTEROL CALCULATED: 121 MG/DL (ref 0–160)
LYMPHOCYTES ABSOLUTE: 0.7 /ΜL
LYMPHOCYTES ABSOLUTE: 0.9 /ΜL
LYMPHOCYTES ABSOLUTE: 1.1 /ΜL
LYMPHOCYTES ABSOLUTE: 1.3 /ΜL
LYMPHOCYTES ABSOLUTE: 1.9 /ΜL
LYMPHOCYTES ABSOLUTE: 2 /ΜL
LYMPHOCYTES ABSOLUTE: 2.1 /ΜL
LYMPHOCYTES ABSOLUTE: 2.3 /ΜL
LYMPHOCYTES ABSOLUTE: 2.3 /ΜL
LYMPHOCYTES ABSOLUTE: 2.5 /ΜL
LYMPHOCYTES ABSOLUTE: 2.6 /ΜL
LYMPHOCYTES ABSOLUTE: 2.7 /ΜL
LYMPHOCYTES ABSOLUTE: ABNORMAL
LYMPHOCYTES RELATIVE PERCENT: 13.4 %
LYMPHOCYTES RELATIVE PERCENT: 17.8 %
LYMPHOCYTES RELATIVE PERCENT: 22.6 %
LYMPHOCYTES RELATIVE PERCENT: 22.7 %
LYMPHOCYTES RELATIVE PERCENT: 24.4 %
LYMPHOCYTES RELATIVE PERCENT: 25.9 %
LYMPHOCYTES RELATIVE PERCENT: 28.7 %
LYMPHOCYTES RELATIVE PERCENT: 28.8 %
LYMPHOCYTES RELATIVE PERCENT: 7.8 %
LYMPHOCYTES RELATIVE PERCENT: 8.1 %
LYMPHOCYTES RELATIVE PERCENT: 8.4 %
LYMPHOCYTES RELATIVE PERCENT: 9.2 %
LYMPHOCYTES RELATIVE PERCENT: ABNORMAL
MCH RBC QN AUTO: 28.6 PG
MCH RBC QN AUTO: 28.6 PG
MCH RBC QN AUTO: 28.7 PG
MCH RBC QN AUTO: 28.8 PG
MCH RBC QN AUTO: 28.8 PG
MCH RBC QN AUTO: 29 PG
MCH RBC QN AUTO: 29.2 PG
MCH RBC QN AUTO: 29.3 PG
MCH RBC QN AUTO: 29.4 PG
MCH RBC QN AUTO: 29.7 PG
MCH RBC QN AUTO: NORMAL PG
MCHC RBC AUTO-ENTMCNC: 34.3 G/DL
MCHC RBC AUTO-ENTMCNC: 34.3 G/DL
MCHC RBC AUTO-ENTMCNC: 34.4 G/DL
MCHC RBC AUTO-ENTMCNC: 34.5 G/DL
MCHC RBC AUTO-ENTMCNC: 34.5 G/DL
MCHC RBC AUTO-ENTMCNC: 34.8 G/DL
MCHC RBC AUTO-ENTMCNC: 35 G/DL
MCHC RBC AUTO-ENTMCNC: 35.1 G/DL
MCHC RBC AUTO-ENTMCNC: 35.5 G/DL
MCHC RBC AUTO-ENTMCNC: 35.5 G/DL
MCHC RBC AUTO-ENTMCNC: 35.7 G/DL
MCHC RBC AUTO-ENTMCNC: 35.7 G/DL
MCHC RBC AUTO-ENTMCNC: NORMAL G/DL
MCV RBC AUTO: 82.4 FL
MCV RBC AUTO: 82.4 FL
MCV RBC AUTO: 82.8 FL
MCV RBC AUTO: 83 FL
MCV RBC AUTO: 83.2 FL
MCV RBC AUTO: 83.2 FL
MCV RBC AUTO: 83.3 FL
MCV RBC AUTO: 83.4 FL
MCV RBC AUTO: 83.6 FL
MCV RBC AUTO: 83.7 FL
MCV RBC AUTO: 83.8 FL
MCV RBC AUTO: 84 FL
MCV RBC AUTO: NORMAL FL
MONOCYTES ABSOLUTE: 0.6 /ΜL
MONOCYTES ABSOLUTE: 0.6 /ΜL
MONOCYTES ABSOLUTE: 0.7 /ΜL
MONOCYTES ABSOLUTE: 0.8 /ΜL
MONOCYTES ABSOLUTE: 0.9 /ΜL
MONOCYTES ABSOLUTE: 1 /ΜL
MONOCYTES ABSOLUTE: ABNORMAL
MONOCYTES RELATIVE PERCENT: 5.5 %
MONOCYTES RELATIVE PERCENT: 6 %
MONOCYTES RELATIVE PERCENT: 6.4 %
MONOCYTES RELATIVE PERCENT: 7.3 %
MONOCYTES RELATIVE PERCENT: 7.9 %
MONOCYTES RELATIVE PERCENT: 8 %
MONOCYTES RELATIVE PERCENT: 8.2 %
MONOCYTES RELATIVE PERCENT: 8.2 %
MONOCYTES RELATIVE PERCENT: 8.6 %
MONOCYTES RELATIVE PERCENT: 8.9 %
MONOCYTES RELATIVE PERCENT: 9.3 %
MONOCYTES RELATIVE PERCENT: 9.9 %
MONOCYTES RELATIVE PERCENT: ABNORMAL
NEUTROPHILS ABSOLUTE: 11.4 /ΜL
NEUTROPHILS ABSOLUTE: 4.4 /ΜL
NEUTROPHILS ABSOLUTE: 5.3 /ΜL
NEUTROPHILS ABSOLUTE: 5.4 /ΜL
NEUTROPHILS ABSOLUTE: 5.5 /ΜL
NEUTROPHILS ABSOLUTE: 5.8 /ΜL
NEUTROPHILS ABSOLUTE: 6.5 /ΜL
NEUTROPHILS ABSOLUTE: 6.9 /ΜL
NEUTROPHILS ABSOLUTE: 7.1 /ΜL
NEUTROPHILS ABSOLUTE: 7.6 /ΜL
NEUTROPHILS ABSOLUTE: 7.9 /ΜL
NEUTROPHILS ABSOLUTE: 9.8 /ΜL
NEUTROPHILS ABSOLUTE: ABNORMAL
NEUTROPHILS RELATIVE PERCENT: 59 %
NEUTROPHILS RELATIVE PERCENT: 59.9 %
NEUTROPHILS RELATIVE PERCENT: 60.7 %
NEUTROPHILS RELATIVE PERCENT: 63.2 %
NEUTROPHILS RELATIVE PERCENT: 65.9 %
NEUTROPHILS RELATIVE PERCENT: 66.2 %
NEUTROPHILS RELATIVE PERCENT: 67.4 %
NEUTROPHILS RELATIVE PERCENT: 76 %
NEUTROPHILS RELATIVE PERCENT: 76.1 %
NEUTROPHILS RELATIVE PERCENT: 82.63 %
NEUTROPHILS RELATIVE PERCENT: 84.4 %
NEUTROPHILS RELATIVE PERCENT: 84.7 %
NEUTROPHILS RELATIVE PERCENT: ABNORMAL
NONHDLC SERPL-MCNC: ABNORMAL MG/DL
PDW BLD-RTO: NORMAL %
PLATELET # BLD: 122 K/ΜL
PLATELET # BLD: 127 K/ΜL
PLATELET # BLD: 139 K/ΜL
PLATELET # BLD: 143 K/ΜL
PLATELET # BLD: 152 K/ΜL
PLATELET # BLD: 154 K/ΜL
PLATELET # BLD: 154 K/ΜL
PLATELET # BLD: 158 K/ΜL
PLATELET # BLD: 161 K/ΜL
PLATELET # BLD: 161 K/ΜL
PLATELET # BLD: 162 K/ΜL
PLATELET # BLD: 183 K/ΜL
PLATELET # BLD: NORMAL 10*3/UL
PMV BLD AUTO: 8.4 FL
PMV BLD AUTO: 8.6 FL
PMV BLD AUTO: 8.6 FL
PMV BLD AUTO: 8.8 FL
PMV BLD AUTO: 9.1 FL
PMV BLD AUTO: 9.3 FL
PMV BLD AUTO: 9.5 FL
PMV BLD AUTO: NORMAL FL
POTASSIUM SERPL-SCNC: 3.7 MMOL/L
POTASSIUM SERPL-SCNC: 3.9 MMOL/L
POTASSIUM SERPL-SCNC: 3.9 MMOL/L
POTASSIUM SERPL-SCNC: 4 MMOL/L
POTASSIUM SERPL-SCNC: 4.1 MMOL/L
POTASSIUM SERPL-SCNC: 4.1 MMOL/L
POTASSIUM SERPL-SCNC: 4.2 MMOL/L
POTASSIUM SERPL-SCNC: 4.3 MMOL/L
POTASSIUM SERPL-SCNC: 4.3 MMOL/L
POTASSIUM SERPL-SCNC: 4.5 MMOL/L
POTASSIUM SERPL-SCNC: 4.7 MMOL/L
POTASSIUM SERPL-SCNC: NORMAL MMOL/L
PROTIME: 11.9 SECONDS
PROTIME: 14.4 SECONDS
RBC # BLD: 2.59 10^6/ΜL
RBC # BLD: 2.65 10^6/ΜL
RBC # BLD: 2.9 10^6/ΜL
RBC # BLD: 3.04 10^6/ΜL
RBC # BLD: 3.36 10^6/ΜL
RBC # BLD: 4.25 10^6/ΜL
RBC # BLD: 4.33 10^6/ΜL
RBC # BLD: 4.41 10^6/ΜL
RBC # BLD: 4.43 10^6/ΜL
RBC # BLD: 4.48 10^6/ΜL
RBC # BLD: 4.49 10^6/ΜL
RBC # BLD: 4.51 10^6/ΜL
RBC # BLD: NORMAL 10*6/UL
SODIUM BLD-SCNC: 133 MMOL/L
SODIUM BLD-SCNC: 134 MMOL/L
SODIUM BLD-SCNC: 135 MMOL/L
SODIUM BLD-SCNC: 136 MMOL/L
SODIUM BLD-SCNC: 136 MMOL/L
SODIUM BLD-SCNC: 137 MMOL/L
SODIUM BLD-SCNC: 137 MMOL/L
SODIUM BLD-SCNC: 138 MMOL/L
SODIUM BLD-SCNC: 138 MMOL/L
SODIUM BLD-SCNC: 139 MMOL/L
SODIUM BLD-SCNC: 142 MMOL/L
SODIUM BLD-SCNC: NORMAL MMOL/L
TOTAL PROTEIN: 5.4
TOTAL PROTEIN: 5.5
TOTAL PROTEIN: 6.1
TOTAL PROTEIN: 6.3
TOTAL PROTEIN: 6.7
TOTAL PROTEIN: NORMAL
TRIGL SERPL-MCNC: 152 MG/DL
TROPONIN: 176.12
TROPONIN: 248.18
TSH SERPL DL<=0.05 MIU/L-ACNC: 1.47 UIU/ML
VLDLC SERPL CALC-MCNC: ABNORMAL MG/DL
WBC # BLD: 10.4 10^3/ML
WBC # BLD: 10.4 10^3/ML
WBC # BLD: 12.8 10^3/ML
WBC # BLD: 13.5 10^3/ML
WBC # BLD: 7.5 10^3/ML
WBC # BLD: 8.2 10^3/ML
WBC # BLD: 8.6 10^3/ML
WBC # BLD: 8.7 10^3/ML
WBC # BLD: 9.1 10^3/ML
WBC # BLD: 9.2 10^3/ML
WBC # BLD: 9.4 10^3/ML
WBC # BLD: 9.6 10^3/ML
WBC # BLD: NORMAL 10*3/UL

## 2022-06-20 ENCOUNTER — TELEPHONE (OUTPATIENT)
Dept: NON INVASIVE DIAGNOSTICS | Age: 72
End: 2022-06-20

## 2022-06-20 RX ORDER — METOPROLOL SUCCINATE 25 MG/1
25 TABLET, EXTENDED RELEASE ORAL DAILY
COMMUNITY
Start: 2022-06-03 | End: 2022-07-05 | Stop reason: DRUGHIGH

## 2022-06-20 RX ORDER — MEXILETINE HYDROCHLORIDE 200 MG/1
200 CAPSULE ORAL 3 TIMES DAILY
COMMUNITY
Start: 2022-06-07

## 2022-06-30 ENCOUNTER — TELEPHONE (OUTPATIENT)
Dept: FAMILY MEDICINE CLINIC | Age: 72
End: 2022-06-30

## 2022-06-30 NOTE — TELEPHONE ENCOUNTER
----- Message from [place] sent at 6/30/2022  8:18 AM EDT -----  Subject: Message to Provider    QUESTIONS  Information for Provider? Pt is calling to let his pcp know why he missed   his last appt is because pt was in the hospital. Pt had bypass and was in   the hospital for 10 days. pt will call back to r/s appt.   ---------------------------------------------------------------------------  --------------  CALL BACK INFO  What is the best way for the office to contact you? Do not leave any   message, patient will call back for answer  Preferred Call Back Phone Number? [phone]  ---------------------------------------------------------------------------  --------------  SCRIPT ANSWERS  Relationship to Patient?  Self

## 2022-07-05 ENCOUNTER — SCHEDULED TELEPHONE ENCOUNTER (OUTPATIENT)
Dept: FAMILY MEDICINE CLINIC | Age: 72
End: 2022-07-05
Payer: MEDICARE

## 2022-07-05 ENCOUNTER — TELEPHONE (OUTPATIENT)
Dept: PHARMACY | Facility: CLINIC | Age: 72
End: 2022-07-05

## 2022-07-05 DIAGNOSIS — G89.29 CHRONIC BILATERAL LOW BACK PAIN WITH SCIATICA, SCIATICA LATERALITY UNSPECIFIED: Primary | ICD-10-CM

## 2022-07-05 DIAGNOSIS — I25.10 CORONARY ARTERY DISEASE INVOLVING NATIVE CORONARY ARTERY OF NATIVE HEART WITHOUT ANGINA PECTORIS: ICD-10-CM

## 2022-07-05 DIAGNOSIS — M54.40 CHRONIC BILATERAL LOW BACK PAIN WITH SCIATICA, SCIATICA LATERALITY UNSPECIFIED: Primary | ICD-10-CM

## 2022-07-05 LAB
ALBUMIN SERPL-MCNC: 3.7 G/DL
ALP BLD-CCNC: 78 U/L
ALT SERPL-CCNC: 83 U/L
ANION GAP SERPL CALCULATED.3IONS-SCNC: 1.2 MMOL/L
AST SERPL-CCNC: 32 U/L
BILIRUB SERPL-MCNC: 0.5 MG/DL (ref 0.1–1.4)
BUN BLDV-MCNC: 35 MG/DL
CALCIUM SERPL-MCNC: 9.7 MG/DL
CHLORIDE BLD-SCNC: 105 MMOL/L
CO2: 22 MMOL/L
CREAT SERPL-MCNC: 1.4 MG/DL
GFR CALCULATED: 60
GLUCOSE BLD-MCNC: 89 MG/DL
POTASSIUM SERPL-SCNC: 4.3 MMOL/L
SODIUM BLD-SCNC: 134 MMOL/L
TOTAL PROTEIN: 6.7

## 2022-07-05 PROCEDURE — 99442 PR PHYS/QHP TELEPHONE EVALUATION 11-20 MIN: CPT | Performed by: FAMILY MEDICINE

## 2022-07-05 RX ORDER — METOPROLOL SUCCINATE 50 MG/1
50 TABLET, EXTENDED RELEASE ORAL NIGHTLY
Qty: 90 TABLET | Refills: 0 | Status: SHIPPED
Start: 2022-07-05

## 2022-07-05 RX ORDER — GABAPENTIN 300 MG/1
300 CAPSULE ORAL 3 TIMES DAILY
Qty: 90 CAPSULE | Refills: 2 | Status: SHIPPED | OUTPATIENT
Start: 2022-07-05 | End: 2022-10-03

## 2022-07-05 RX ORDER — GABAPENTIN 300 MG/1
300 CAPSULE ORAL 3 TIMES DAILY
COMMUNITY
End: 2022-07-05 | Stop reason: SDUPTHER

## 2022-07-05 SDOH — ECONOMIC STABILITY: FOOD INSECURITY: WITHIN THE PAST 12 MONTHS, THE FOOD YOU BOUGHT JUST DIDN'T LAST AND YOU DIDN'T HAVE MONEY TO GET MORE.: NEVER TRUE

## 2022-07-05 SDOH — ECONOMIC STABILITY: FOOD INSECURITY: WITHIN THE PAST 12 MONTHS, YOU WORRIED THAT YOUR FOOD WOULD RUN OUT BEFORE YOU GOT MONEY TO BUY MORE.: NEVER TRUE

## 2022-07-05 ASSESSMENT — SOCIAL DETERMINANTS OF HEALTH (SDOH): HOW HARD IS IT FOR YOU TO PAY FOR THE VERY BASICS LIKE FOOD, HOUSING, MEDICAL CARE, AND HEATING?: NOT HARD AT ALL

## 2022-07-05 NOTE — PROGRESS NOTES
Raquel Wolf is a 67 y.o. male evaluated via telephone on 7/5/2022 for Follow-up [phone])  He was admitted to [facility] in [place] for CABG in Late May or Early June. Documentation:  I communicated with the patient and/or health care decision maker about CAD, CABG, cardiac care desires, chronic leg pain and neuropathy  . Details of this discussion including any medical advice provided: He would like refill on gabapentin 300mg TID for peripheral neuropathy and back pain. Since last seen, patient had triple bypass surgery at [facility] in [place]. He also was having some heart rhythm abnormalities since then and had his medications by electrophysiology. He intends to only follow in [place] with this team now. His defibrillator went off 7 imes in 1 day per patient prior to the CABG, no beds in [facility], so tranferred to [place]. Dr. Rasheed Grover was CTS, had bypass memorial day. In hospital about 10 days. Came home. Eectrolytes \"dropped off\" and he went back for 2 days. Has been home about a week now. Has home visiting nurses attending right now. Home therapy as well. Metoprolol 50mg once daily per patient. Continues following with Dr Richy Strange at [facility] for pain mgmt. A/P  CAD / s/pCABG / Electrophys needs - He will continue to follow with team in [place]. Has home healthcare that I signed off on and appropriate. Didn't feel strong enough to come mihir offic today so requested virtual appt - unable to do video visit. Chronic back pain with peripheral neuropathy  Refill GBP same dose. OARRS reviewed. Follows with [facility] pain [facility] for opiate Tx.     FU in person in 3 months. Total Time: minutes: 11-20 minutes 2:58pm - 3:15pm    Raquel Wolf was evaluated through a synchronous (real-time) audio encounter. Patient identification was verified at the start of the visit.  He (or guardian if applicable) is aware that this is a billable service, which includes applicable co-pays. This visit was conducted with the patient's (and/or legal guardian's) verbal consent. He has not had a related appointment within my department in the past 7 days or scheduled within the next 24 hours. The patient was located at Home: [facility], [address] [address]. The provider was located at [place] [address] (Appt Dept): [address] [place] [address].     Note: not billable if this call serves to triage the patient into an appointment for the relevant concern    Yanira Pride MD

## 2022-07-05 NOTE — TELEPHONE ENCOUNTER
[facility]: STATIN THERAPY REVIEW  Identified statin use in persons with cardiovascular disease care gap per James. Records dated 6/20/22. Last Office Visit: today    Patient not found in Outcomes [place]    ASSESSMENT:  Patient has been identified as having a diagnosis for clinical ASCVD or event (e.g., inpatient hospitalization for MI, CABG, PCI or other revascularization procedures) in the measurement year and not currently filling a moderate or high intensity statin. Patients included in this care gap are males age 18-72 and females age 43-69. Per chart review, patient underwent CABG in May 2022. Per cardiology report scanned into the media tab: \"statin not started at discharge d/t elevated LFT's. Has follow-up appointment with Dr. Mariah Sifuentes". Lab Results   Component Value Date    CHOL 180 05/21/2022    TRIG 152 05/21/2022    HDL 29 (A) 05/21/2022    LDLCALC 121 05/21/2022     ALT   Date Value Ref Range Status   06/12/2022 84 (H) 10 - 63 U/L Final     AST   Date Value Ref Range Status   06/12/2022 40 10 - 41 U/L Final       The 10-year ASCVD risk score (Calvin Parsons, et al., 2013) is: 13.6%    Values used to calculate the score:      Age: 67 years      Sex: Male      Is Non- : Yes      Diabetic: No      Tobacco smoker: No      Systolic Blood Pressure: 127 mmHg      Is BP treated: No      HDL Cholesterol: 29 mg/dL      Total Cholesterol: 180 mg/dL     Hyperlipidemia Goal: Patient has a history of ASCVD and is therefore a candidate for high-intensity statin therapy based on updated guidelines. PLAN:  Will prepare and send a fax recommendation to Dr. Mariah Sifuentes today to consider statin therapy at next office visit. No future appointments.     Sarabjit Soto, PharmD, [place] // Department, toll free [phone], option 1       For Estrada Parikh in place: No   Recommendation Provided To: Provider: 1 via Fax sent to office   Intervention Detail: New Rx: 1, reason: Needs Additional Therapy   Gap Closed?: No    Intervention Accepted By: Provider: 0   Time Spent (min): 15

## 2022-07-05 NOTE — LETTER
Using a Statin for Your Patient with Cardiovascular Disease    Date: 22    Dear Ryan Jason MD   Fax: [phone]    Concerning patient: Beni Kay  :  1950    It is strongly recommended that your patient with cardiovascular disease be on a moderate or high-intensity statin     Statin therapy for secondary prevention in patients with clinical arthrosclerotic cardiovascular disease (ASCVD) is recommended by the [employer] of Cardiology and American Heart Association (ACC/AHA). These guidelines are based on numerous trials that show moderate to high-intensity statins reduce additional ASCVD events and disease progression. The Centers for Medicare & Medicaid Services (CMS) also supports this recommendation. Their \"Statin Use in Persons with Cardiovascular Disease\" measure as part of the overall STAR rating. Our team of clinical pharmacists is working with the patient's health plan to assess the gap in therapy. ACC/AHA guidelines call for a high-intensity statin for patients with clinical ASCVD. Moderate-intensity statin therapy may be considered if the patient has drug-drug interactions or a history of statin intolerance. These recommendations are regardless of the baseline lipid levels. Please consider adding at least a moderate-intensity statin to the patient's medication regimen if his LFT's have improved. Patient underwent recent CABG recently and not currently prescribed statin therapy. If you agree, send a prescription to your patients pharmacy. If you do not agree, please return response to fax number below for our records. Please also feel free to contact me at the number below with any questions or concerns, or if you would like me to further discuss with your patient. Thank you for your help and consideration in caring for this patient.     Respectfully,  [address], [place] [address]  Phone: [phone] // Fax: [phone]

## 2022-09-02 ENCOUNTER — OFFICE VISIT (OUTPATIENT)
Dept: FAMILY MEDICINE CLINIC | Age: 72
End: 2022-09-02
Payer: MEDICARE

## 2022-09-02 VITALS
BODY MASS INDEX: 29.39 KG/M2 | DIASTOLIC BLOOD PRESSURE: 78 MMHG | SYSTOLIC BLOOD PRESSURE: 132 MMHG | TEMPERATURE: 98.4 F | WEIGHT: 217 LBS | HEIGHT: 72 IN | HEART RATE: 88 BPM | OXYGEN SATURATION: 96 %

## 2022-09-02 DIAGNOSIS — D64.9 POSTOPERATIVE ANEMIA: ICD-10-CM

## 2022-09-02 DIAGNOSIS — Z12.5 SCREENING FOR MALIGNANT NEOPLASM OF PROSTATE: ICD-10-CM

## 2022-09-02 DIAGNOSIS — L30.4 INTERTRIGO: Primary | ICD-10-CM

## 2022-09-02 LAB
BASOPHILS ABSOLUTE: 0.07 E9/L (ref 0–0.2)
BASOPHILS RELATIVE PERCENT: 0.8 % (ref 0–2)
EOSINOPHILS ABSOLUTE: 0.13 E9/L (ref 0.05–0.5)
EOSINOPHILS RELATIVE PERCENT: 1.4 % (ref 0–6)
HCT VFR BLD CALC: 35.6 % (ref 37–54)
HEMOGLOBIN: 12.5 G/DL (ref 12.5–16.5)
IMMATURE GRANULOCYTES #: 0.02 E9/L
IMMATURE GRANULOCYTES %: 0.2 % (ref 0–5)
LYMPHOCYTES ABSOLUTE: 2.34 E9/L (ref 1.5–4)
LYMPHOCYTES RELATIVE PERCENT: 25.5 % (ref 20–42)
MCH RBC QN AUTO: 26.2 PG (ref 26–35)
MCHC RBC AUTO-ENTMCNC: 35.1 % (ref 32–34.5)
MCV RBC AUTO: 74.5 FL (ref 80–99.9)
MONOCYTES ABSOLUTE: 0.84 E9/L (ref 0.1–0.95)
MONOCYTES RELATIVE PERCENT: 9.2 % (ref 2–12)
NEUTROPHILS ABSOLUTE: 5.78 E9/L (ref 1.8–7.3)
NEUTROPHILS RELATIVE PERCENT: 62.9 % (ref 43–80)
PDW BLD-RTO: 17.2 FL (ref 11.5–15)
PLATELET # BLD: 179 E9/L (ref 130–450)
PMV BLD AUTO: 10.9 FL (ref 7–12)
RBC # BLD: 4.78 E12/L (ref 3.8–5.8)
WBC # BLD: 9.2 E9/L (ref 4.5–11.5)

## 2022-09-02 PROCEDURE — 1123F ACP DISCUSS/DSCN MKR DOCD: CPT | Performed by: FAMILY MEDICINE

## 2022-09-02 PROCEDURE — 99213 OFFICE O/P EST LOW 20 MIN: CPT | Performed by: FAMILY MEDICINE

## 2022-09-02 RX ORDER — ATORVASTATIN CALCIUM 20 MG/1
20 TABLET, FILM COATED ORAL DAILY
COMMUNITY
Start: 2022-08-09

## 2022-09-02 RX ORDER — KETOCONAZOLE 20 MG/G
CREAM TOPICAL
Qty: 30 G | Refills: 1 | Status: SHIPPED | OUTPATIENT
Start: 2022-09-02

## 2022-09-02 NOTE — PROGRESS NOTES
9/2/2022    _________________________________________________________  Current Outpatient Medications on File Prior to Visit   Medication Sig Dispense Refill    atorvastatin (LIPITOR) 20 MG tablet Take 20 mg by mouth daily      gabapentin (NEURONTIN) 300 MG capsule Take 1 capsule by mouth 3 times daily for 90 days. 90 capsule 2    metoprolol succinate (TOPROL XL) 50 MG extended release tablet Take 1 tablet by mouth at bedtime 90 tablet 0    mexiletine (MEXITIL) 200 MG capsule Take 200 mg by mouth 3 times daily      sildenafil (REVATIO) 20 MG tablet Take 1-5 tablets by mouth daily as needed (30 minutes before sexual activity.) 20 tablet 2    ondansetron (ZOFRAN) 4 MG tablet Take 1 tablet by mouth every 8 hours as needed for Nausea or Vomiting 15 tablet 0    quiNIDine gluconate 324 MG extended release tablet Take 1 tablet by mouth 3 times daily 90 tablet 5    escitalopram (LEXAPRO) 10 MG tablet TAKE ONE TABLET BY MOUTH IN THE EVENING WITH DINNER FOR DEPRESSION AND ANXIETY      fluticasone (FLONASE ALLERGY RELIEF) 50 MCG/ACT nasal spray 1 spray by Each Nostril route daily 1 Bottle 1    busPIRone (BUSPAR) 10 MG tablet Take 1 tablet by mouth 3 times daily 90 tablet 2    omeprazole (PRILOSEC) 20 MG delayed release capsule Take 1 capsule by mouth Daily 30 capsule 2    HYDROcodone-acetaminophen (NORCO)  MG per tablet TAKE ONE TABLET FOUR TIMES DAILY AS NEEDED FOR PAIN      aspirin 81 MG chewable tablet Take by mouth daily        No current facility-administered medications on file prior to visit. Patient Active Problem List   Diagnosis Code    PTSD (post-traumatic stress disorder) F43.10    Depression F32. A    Pacemaker Z95.0    HTN (hypertension) I10    Spinal stenosis at L4-L5 level M48.061    Depression, major, recurrent, moderate (HCC) F33.1    Ventricular tachycardia (HCC) I47.2    ICD (implantable cardioverter-defibrillator) battery depletion Z45.02    Coronary artery disease involving native coronary artery of native heart without angina pectoris I25.10     _________________________________________________________  Past Medical History:   Diagnosis Date    Cerebral artery occlusion with cerebral infarction [facility])     Chronic back pain     Coronary artery disease involving native coronary artery of native heart without angina pectoris 2022    Depression     Hyperlipidemia     Hypertension     Pacemaker     Sleep apnea     TIA (transient ischemic attack)     Ventricular tachycardia ([address]        Family History   Problem Relation Age of Onset    High Blood Pressure Father     Diabetes Sister     Heart Disease Brother     Diabetes Sister     Diabetes Sister        Past Surgical History:   Procedure Laterality Date    BACK SURGERY      CARDIAC CATHETERIZATION  09/15/2020    Dr. Connor Fernandes      UPGRADE PPM TO DUAL ICD    ([facility])   DR. Annalee Knowles     CARDIAC PACEMAKER PLACEMENT      CHOLECYSTECTOMY      EYE SURGERY Left     FOOT SURGERY      VENTRICULOPERITONEAL SHUNT         Social History     Tobacco Use    Smoking status: Former     Packs/day: 1.00     Years: 1.00     Pack years: 1.00     Types: Cigarettes     Quit date:      Years since quittin.7    Smokeless tobacco: Never    Tobacco comments:     quit 30 years   Vaping Use    Vaping Use: Never used   Substance Use Topics    Alcohol use: Not Currently     Comment: rare    Drug use: No       Chart reviewed and updated where appropriate for PMH, Fam, and Soc Hx.  _________________________________________________________  ROS: POSITIVE: As in the HPI. Otherwise Pertinent negatives are negative.    __________________________________________________________  Physical Exam   Constitutional:    He is oriented to person, place, and time. He appears well-developed and well-nourished. Eyes:    Conjunctivae are normal.    Pupils are equal, round, and reactive to light. EOMI.    Neck:    Normal range of motion. No thyromegaly or nodules noted. No bruit. No LAD. Cardiovascular:    Normal rate, regular rhythm and normal heart sounds. No murmur. No gallop and no friction rub. Pulmonary/Chest:    Effort normal and breath sounds normal.    No wheezes. No rales or rhonchi. Musculoskeletal:    Normal range of motion. No joint swelling noted. No peripheral edema. Skin:    Skin is warm and dry. Intertriginous patch of brown/tan circular milad hon the left inner thigh where the scrotum presses. ________________________________________________________    This note may have been created using dictation software.  Efforts were made to reduce errors, but some may persist.

## 2022-09-03 LAB — PROSTATE SPECIFIC ANTIGEN: 2.17 NG/ML (ref 0–4)

## 2022-09-06 DIAGNOSIS — R71.8 MICROCYTOSIS: Primary | ICD-10-CM

## 2022-10-06 ENCOUNTER — TELEPHONE (OUTPATIENT)
Dept: FAMILY MEDICINE CLINIC | Age: 72
End: 2022-10-06

## 2022-10-06 NOTE — TELEPHONE ENCOUNTER
98.2 Please advise patient labs reviewed and looking ok. His blood cells were a little bit small ut he is not anemic. Sometimes this can suggest an iron deficiency. I placed some iron lab blood work. Could get this done at our office or [facility] lab - whichever easier. Prostate marker is good, stable. We mailed a letter b/c we could not reach patient by phone. He called the office and I reviewed the lab results and recommendations w/ him. He will go to the lab soon to have the additional test run.

## 2022-10-25 DIAGNOSIS — I10 ESSENTIAL HYPERTENSION: ICD-10-CM

## 2022-10-27 RX ORDER — LISINOPRIL 30 MG/1
TABLET ORAL
Qty: 90 TABLET | Refills: 1 | OUTPATIENT
Start: 2022-10-27

## 2022-10-31 DIAGNOSIS — I10 ESSENTIAL HYPERTENSION: ICD-10-CM

## 2022-10-31 RX ORDER — LISINOPRIL 30 MG/1
TABLET ORAL
Qty: 90 TABLET | Refills: 1 | OUTPATIENT
Start: 2022-10-31

## 2022-11-04 DIAGNOSIS — I10 ESSENTIAL HYPERTENSION: ICD-10-CM

## 2022-11-08 RX ORDER — LISINOPRIL 30 MG/1
TABLET ORAL
Qty: 90 TABLET | Refills: 1 | OUTPATIENT
Start: 2022-11-08

## 2022-11-12 DIAGNOSIS — I10 ESSENTIAL HYPERTENSION: ICD-10-CM

## 2022-11-18 NOTE — TELEPHONE ENCOUNTER
The original prescription was discontinued on 7/5/2022 by Lizette Marshall MD for the following reason: Therapy completed. Renewing this prescription may not be appropriate. Unclear in prog notes from 7/5/22 what went on with lisinopril rx. Tried to call pt to check with him. No answer, no VM. Are you able to clarify?

## 2022-11-18 NOTE — TELEPHONE ENCOUNTER
Unsure. I dont see where I discontinued it. Was on his med list in march or April 2022 when he saw electrophys in [place]. But then was admitted in [facility] and they may have changed his meds around.

## 2022-11-25 RX ORDER — LISINOPRIL 30 MG/1
TABLET ORAL
Qty: 90 TABLET | Refills: 1 | OUTPATIENT
Start: 2022-11-25

## 2022-11-25 RX ORDER — LISINOPRIL 30 MG/1
30 TABLET ORAL DAILY
Qty: 90 TABLET | Refills: 1 | Status: SHIPPED | OUTPATIENT
Start: 2022-11-25

## 2022-11-25 NOTE — TELEPHONE ENCOUNTER
Patient stated that he is still taking Lisinopril and stated that to his knowledge that he was never taken off of this.

## 2022-11-28 ENCOUNTER — OFFICE VISIT (OUTPATIENT)
Dept: FAMILY MEDICINE CLINIC | Age: 72
End: 2022-11-28
Payer: MEDICARE

## 2022-11-28 VITALS
TEMPERATURE: 97.6 F | DIASTOLIC BLOOD PRESSURE: 76 MMHG | RESPIRATION RATE: 18 BRPM | SYSTOLIC BLOOD PRESSURE: 130 MMHG | HEIGHT: 72 IN | WEIGHT: 228.2 LBS | HEART RATE: 58 BPM | OXYGEN SATURATION: 99 % | BODY MASS INDEX: 30.91 KG/M2

## 2022-11-28 DIAGNOSIS — L30.4 INTERTRIGO: Primary | ICD-10-CM

## 2022-11-28 DIAGNOSIS — G89.29 CHRONIC PAIN OF LEFT KNEE: ICD-10-CM

## 2022-11-28 DIAGNOSIS — M25.562 CHRONIC PAIN OF LEFT KNEE: ICD-10-CM

## 2022-11-28 PROCEDURE — 99213 OFFICE O/P EST LOW 20 MIN: CPT | Performed by: STUDENT IN AN ORGANIZED HEALTH CARE EDUCATION/TRAINING PROGRAM

## 2022-11-28 PROCEDURE — 1123F ACP DISCUSS/DSCN MKR DOCD: CPT | Performed by: STUDENT IN AN ORGANIZED HEALTH CARE EDUCATION/TRAINING PROGRAM

## 2022-11-28 PROCEDURE — 3074F SYST BP LT 130 MM HG: CPT | Performed by: STUDENT IN AN ORGANIZED HEALTH CARE EDUCATION/TRAINING PROGRAM

## 2022-11-28 PROCEDURE — 3078F DIAST BP <80 MM HG: CPT | Performed by: STUDENT IN AN ORGANIZED HEALTH CARE EDUCATION/TRAINING PROGRAM

## 2022-11-28 RX ORDER — ZINC OXIDE
OINTMENT (GRAM) TOPICAL
Qty: 56 G | Refills: 0 | Status: SHIPPED | OUTPATIENT
Start: 2022-11-28

## 2022-11-28 RX ORDER — FLUCONAZOLE 150 MG/1
150 TABLET ORAL
Qty: 2 TABLET | Refills: 0 | Status: SHIPPED | OUTPATIENT
Start: 2022-11-28 | End: 2022-12-04

## 2022-11-28 ASSESSMENT — ENCOUNTER SYMPTOMS
COUGH: 0
NAUSEA: 0
SHORTNESS OF BREATH: 0
VOMITING: 0
RHINORRHEA: 0

## 2022-11-28 NOTE — PROGRESS NOTES
Esme Fernández (:  1950) is a 67 y.o. male,Established patient, here for evaluation of the following chief complaint(s):  Rash (States that he has a rash in groin. . states that it is burning and spreading down leg. . his PCP states that patient had jock itch when patient was in to see him )         ASSESSMENT/PLAN:  1. Intertrigo  -     fluconazole (DIFLUCAN) 150 MG tablet; Take 1 tablet by mouth every 72 hours for 6 days, Disp-2 tablet, R-0Normal  -     zinc oxide (DESITIN) 40 % ointment; Apply topically as needed. , Disp-56 g, R-0, Normal  2. Chronic pain of left knee  -     diclofenac sodium (VOLTAREN) 1 % GEL; Apply 4 g topically 4 times daily, Topical, 4 TIMES DAILY Starting Mon 2022, Disp-150 g, R-0, Normal    Continue ketoconazole, add barrier cream, diflucan  Topical NSAID for knee but discussed any further treatment or intervention would need to be discussed with his PCP first, I encouraged him to make an appointment to follow up int he near future. He was in agreement with the plan. Return with pcp soon. Subjective   SUBJECTIVE/OBJECTIVE:  Rash between legs bilaterally  -dx with jock itch a few months ago  -cleared up on the R, still there on the left  -no fever    Wants to talk about knee pain  -hx of L TKA, had a post op infection  -does not follow with ortho any more        Review of Systems   Constitutional:  Negative for chills and fever. HENT:  Negative for congestion and rhinorrhea. Respiratory:  Negative for cough and shortness of breath. Cardiovascular:  Negative for chest pain and leg swelling. Gastrointestinal:  Negative for nausea and vomiting. Musculoskeletal:  Positive for arthralgias and gait problem. Negative for myalgias. Skin:  Positive for rash. Negative for wound. Neurological:  Negative for dizziness and light-headedness. Objective   Physical Exam  Vitals reviewed. Constitutional:       General: He is not in acute distress.   HENT: Head: Normocephalic and atraumatic. Eyes:      Extraocular Movements: Extraocular movements intact. Conjunctiva/sclera: Conjunctivae normal.   Cardiovascular:      Rate and Rhythm: Normal rate. Pulmonary:      Effort: Pulmonary effort is normal.   Musculoskeletal:         General: No tenderness or deformity. Comments: Post surgical scar noted on L knee. No obvious deformity. No effusion or swelling noted. Joint line tenderness is present   Skin:     Findings: Rash present. Rash is macular. Comments: Macular discoloration in the R groin. No drainage. Excoriation noted. R groin is normal   Neurological:      General: No focal deficit present. Mental Status: He is alert and oriented to person, place, and time. An electronic signature was used to authenticate this note.     --Chung Hartley MD

## 2022-12-05 DIAGNOSIS — M20.42 HAMMER TOE OF LEFT FOOT: Primary | ICD-10-CM

## 2022-12-08 ENCOUNTER — TELEPHONE (OUTPATIENT)
Dept: FAMILY MEDICINE CLINIC | Age: 72
End: 2022-12-08

## 2022-12-08 NOTE — TELEPHONE ENCOUNTER
Pt has new pt appt scheduled with Dr. Carlin Sicard on 12/23. He has an OV appt scheduled with Dr. Keron Msoer, PCP on Wed, 12/21 @9:40 am.    Pt called and requested to be seen on the same day, by both doctors to save him a trip. Unable to move PCP appt from 21st to 23rd as  Dr. Keron Moser is out of office on 12/23    Is Dr. Carlin Sicard able to see patient on Wed12/21 instead of Fri 12/23? Please advise/ reschedule/ let patient know.

## 2022-12-21 ENCOUNTER — OFFICE VISIT (OUTPATIENT)
Dept: PODIATRY | Age: 72
End: 2022-12-21

## 2022-12-21 ENCOUNTER — OFFICE VISIT (OUTPATIENT)
Dept: FAMILY MEDICINE CLINIC | Age: 72
End: 2022-12-21
Payer: MEDICARE

## 2022-12-21 VITALS
WEIGHT: 232 LBS | HEART RATE: 65 BPM | BODY MASS INDEX: 31.42 KG/M2 | HEIGHT: 72 IN | OXYGEN SATURATION: 97 % | DIASTOLIC BLOOD PRESSURE: 98 MMHG | TEMPERATURE: 97 F | SYSTOLIC BLOOD PRESSURE: 144 MMHG

## 2022-12-21 VITALS
WEIGHT: 232 LBS | TEMPERATURE: 97 F | BODY MASS INDEX: 31.46 KG/M2 | DIASTOLIC BLOOD PRESSURE: 98 MMHG | SYSTOLIC BLOOD PRESSURE: 144 MMHG

## 2022-12-21 DIAGNOSIS — M79.675 PAIN IN LEFT TOE(S): ICD-10-CM

## 2022-12-21 DIAGNOSIS — M20.42 HAMMER TOE OF LEFT FOOT: ICD-10-CM

## 2022-12-21 DIAGNOSIS — M20.41 HAMMER TOES OF BOTH FEET: ICD-10-CM

## 2022-12-21 DIAGNOSIS — R20.2 PARESTHESIA: ICD-10-CM

## 2022-12-21 DIAGNOSIS — R71.8 MICROCYTOSIS: ICD-10-CM

## 2022-12-21 DIAGNOSIS — I73.9 PERIPHERAL VASCULAR DISEASE, UNSPECIFIED (HCC): ICD-10-CM

## 2022-12-21 DIAGNOSIS — R53.83 FATIGUE, UNSPECIFIED TYPE: Primary | ICD-10-CM

## 2022-12-21 DIAGNOSIS — M79.674 PAIN IN TOE OF RIGHT FOOT: ICD-10-CM

## 2022-12-21 DIAGNOSIS — B35.1 TINEA UNGUIUM: ICD-10-CM

## 2022-12-21 DIAGNOSIS — M20.42 HAMMER TOES OF BOTH FEET: ICD-10-CM

## 2022-12-21 DIAGNOSIS — I73.9 PVD (PERIPHERAL VASCULAR DISEASE) WITH CLAUDICATION (HCC): Primary | ICD-10-CM

## 2022-12-21 DIAGNOSIS — R26.2 DIFFICULTY WALKING: ICD-10-CM

## 2022-12-21 DIAGNOSIS — R53.83 FATIGUE, UNSPECIFIED TYPE: ICD-10-CM

## 2022-12-21 DIAGNOSIS — L30.4 INTERTRIGO: ICD-10-CM

## 2022-12-21 LAB
ALBUMIN SERPL-MCNC: 3.9 G/DL (ref 3.5–5.2)
ALP BLD-CCNC: 57 U/L (ref 40–129)
ALT SERPL-CCNC: 79 U/L (ref 0–40)
ANION GAP SERPL CALCULATED.3IONS-SCNC: 11 MMOL/L (ref 7–16)
AST SERPL-CCNC: 47 U/L (ref 0–39)
BASOPHILS ABSOLUTE: 0.08 E9/L (ref 0–0.2)
BASOPHILS RELATIVE PERCENT: 1 % (ref 0–2)
BILIRUB SERPL-MCNC: 0.4 MG/DL (ref 0–1.2)
BUN BLDV-MCNC: 25 MG/DL (ref 6–23)
CALCIUM SERPL-MCNC: 9.9 MG/DL (ref 8.6–10.2)
CHLORIDE BLD-SCNC: 104 MMOL/L (ref 98–107)
CO2: 23 MMOL/L (ref 22–29)
CREAT SERPL-MCNC: 1.3 MG/DL (ref 0.7–1.2)
EOSINOPHILS ABSOLUTE: 0.11 E9/L (ref 0.05–0.5)
EOSINOPHILS RELATIVE PERCENT: 1.4 % (ref 0–6)
FERRITIN: 85 NG/ML
FOLATE: 4.4 NG/ML (ref 4.8–24.2)
GFR SERPL CREATININE-BSD FRML MDRD: 58 ML/MIN/1.73
GLUCOSE BLD-MCNC: 83 MG/DL (ref 74–99)
HCT VFR BLD CALC: 37.7 % (ref 37–54)
HEMOGLOBIN: 13.3 G/DL (ref 12.5–16.5)
IMMATURE GRANULOCYTES #: 0.02 E9/L
IMMATURE GRANULOCYTES %: 0.2 % (ref 0–5)
IRON SATURATION: 36 % (ref 20–55)
IRON: 115 MCG/DL (ref 59–158)
LYMPHOCYTES ABSOLUTE: 2.14 E9/L (ref 1.5–4)
LYMPHOCYTES RELATIVE PERCENT: 26.6 % (ref 20–42)
MCH RBC QN AUTO: 28 PG (ref 26–35)
MCHC RBC AUTO-ENTMCNC: 35.3 % (ref 32–34.5)
MCV RBC AUTO: 79.4 FL (ref 80–99.9)
MONOCYTES ABSOLUTE: 0.69 E9/L (ref 0.1–0.95)
MONOCYTES RELATIVE PERCENT: 8.6 % (ref 2–12)
NEUTROPHILS ABSOLUTE: 5.02 E9/L (ref 1.8–7.3)
NEUTROPHILS RELATIVE PERCENT: 62.2 % (ref 43–80)
PDW BLD-RTO: 16 FL (ref 11.5–15)
PLATELET # BLD: 186 E9/L (ref 130–450)
PMV BLD AUTO: 11.3 FL (ref 7–12)
POTASSIUM SERPL-SCNC: 4.6 MMOL/L (ref 3.5–5)
RBC # BLD: 4.75 E12/L (ref 3.8–5.8)
SODIUM BLD-SCNC: 138 MMOL/L (ref 132–146)
TOTAL IRON BINDING CAPACITY: 318 MCG/DL (ref 250–450)
TOTAL PROTEIN: 7.1 G/DL (ref 6.4–8.3)
TSH SERPL DL<=0.05 MIU/L-ACNC: 1.03 UIU/ML (ref 0.27–4.2)
VITAMIN B-12: 739 PG/ML (ref 211–946)
WBC # BLD: 8.1 E9/L (ref 4.5–11.5)

## 2022-12-21 PROCEDURE — 1123F ACP DISCUSS/DSCN MKR DOCD: CPT | Performed by: FAMILY MEDICINE

## 2022-12-21 PROCEDURE — 99214 OFFICE O/P EST MOD 30 MIN: CPT | Performed by: FAMILY MEDICINE

## 2022-12-21 PROCEDURE — 3074F SYST BP LT 130 MM HG: CPT | Performed by: FAMILY MEDICINE

## 2022-12-21 PROCEDURE — 3078F DIAST BP <80 MM HG: CPT | Performed by: FAMILY MEDICINE

## 2022-12-21 RX ORDER — CLOTRIMAZOLE 1 %
CREAM (GRAM) TOPICAL
Qty: 60 G | Refills: 1 | Status: SHIPPED | OUTPATIENT
Start: 2022-12-21 | End: 2022-12-28

## 2022-12-21 NOTE — PATIENT INSTRUCTIONS
Call to Schedule non invasive vascular study [phone]  Call Dr. George Gilbert Nurse after scheduled at [phone] to schedule a f/u with Pauly Brunson

## 2022-12-21 NOTE — PROGRESS NOTES
[facility]  Office Progress Note - Dr. Leroy Maxwell  12/21/22    CC:   Chief Complaint   Patient presents with    Rash     Following up from 11/28/22 Express care visit for rash on left leg/groin         BP (!) 144/98   Pulse 65   Temp 97 °F (36.1 °C) (Temporal)   Ht 6' (1.829 m)   Wt 232 lb (105.2 kg)   SpO2 97%   BMI 31.46 kg/m²   Wt Readings from Last 3 Encounters:   12/21/22 232 lb (105.2 kg)   12/21/22 232 lb (105.2 kg)   11/28/22 228 lb 3.2 oz (103.5 kg)       HPI: Presents for follow-up on urgent care visit for groin rash. It appears to be tinea cruris. We treated this in September of last year with ketoconazole. His right groin rash has resolved but the left side persists. He has an erythematous rash on his right inner thigh where his scrotum presses against the thigh and also on scrotum. Consistent with intertrigo. Urgent care treated him with some Desitin barrier cream.  He says that the ketoconazole has not really worked on this rash on the left side. It is occasionally itchy. He also has felt a buzzing sensation near his pacemaker. This is not quite like a shocking and does not feel anything like when his pacemaker has gone off before. He perceives a slight vibration on and off and it is just occasionally annoying. His electrophysiologist told him to check with me before following up with them. He reported that he could feel the sensation while in the office today. With my hand on his chest I cannot appreciate any vibration. I also did not feel any shocking. This does not sound like nerve impingement in the neck or shoulder. He reports he has been quite fatigued over the past few months. Does not have his normal get up and go. His chronic pains in the knees bother him and he feels tired frequently. No recent labs to review. No anginal symptoms. He says the cardiologist told him his heart is working well.   He has gained some weight through the latter half of the year.  _________________________________________________________    Assessment / [place] Johnson was seen today for rash. Diagnoses and all orders for this visit:    Fatigue, unspecified type  -     CBC with Auto Differential; Future  -     Comprehensive Metabolic Panel; Future  -     TSH; Future  -     Iron and TIBC; Future  -     Ferritin; Future  -     Vitamin B12 & Folate; Future    Microcytosis  -     Iron and TIBC; Future  -     Ferritin; Future    Going to do lab work-up to focus on his fatigue. Metoprolol could be contributing potentially. His blood pressure is mildly elevated today and we will revisit that next month. I did ask him to follow-up, but it looks like he did not scheduled yet. Intertrigo  -   Start clotrimazole (LOTRIMIN) 1 % cream; Apply topically 2 times daily for 2 weeks. May continue Desitin cream for barrier protection. Reviewed the importance of keeping the area dry and breathable if possible. Paresthesia  I do not think that his perception of vibration near his pacemaker is anything that is a risk to his health. It does not sound like a shocking or shorting situation and per his report electrophysiology says the pacemaker has been working correctly. Perhaps he has an irritated nerve near the pocket site but I do not think any further work-up is needed at this time. Return in about 1 month (around 1/21/2023) for fatigue. or as scheduled. Patient counseled to follow up sooner or seek more acute care if symptoms worsening or not improving according to plan. Electronically signed by Wilder Solorio MD on 12/21/2022    _________________________________________________________  Current Outpatient Medications on File Prior to Visit   Medication Sig Dispense Refill    diclofenac sodium (VOLTAREN) 1 % GEL Apply 4 g topically 4 times daily 150 g 0    zinc oxide (DESITIN) 40 % ointment Apply topically as needed.  56 g 0    lisinopril (PRINIVIL;ZESTRIL) 30 MG tablet Take 1 tablet by mouth daily For blood pressure. 90 tablet 1    atorvastatin (LIPITOR) 20 MG tablet Take 20 mg by mouth daily      gabapentin (NEURONTIN) 300 MG capsule Take 1 capsule by mouth 3 times daily for 90 days. 90 capsule 2    metoprolol succinate (TOPROL XL) 50 MG extended release tablet Take 1 tablet by mouth at bedtime 90 tablet 0    mexiletine (MEXITIL) 200 MG capsule Take 200 mg by mouth 3 times daily      sildenafil (REVATIO) 20 MG tablet Take 1-5 tablets by mouth daily as needed (30 minutes before sexual activity.) 20 tablet 2    ondansetron (ZOFRAN) 4 MG tablet Take 1 tablet by mouth every 8 hours as needed for Nausea or Vomiting 15 tablet 0    quiNIDine gluconate 324 MG extended release tablet Take 1 tablet by mouth 3 times daily 90 tablet 5    escitalopram (LEXAPRO) 10 MG tablet TAKE ONE TABLET BY MOUTH IN THE EVENING WITH DINNER FOR DEPRESSION AND ANXIETY      fluticasone (FLONASE ALLERGY RELIEF) 50 MCG/ACT nasal spray 1 spray by Each Nostril route daily 1 Bottle 1    busPIRone (BUSPAR) 10 MG tablet Take 1 tablet by mouth 3 times daily 90 tablet 2    omeprazole (PRILOSEC) 20 MG delayed release capsule Take 1 capsule by mouth Daily 30 capsule 2    HYDROcodone-acetaminophen (NORCO)  MG per tablet TAKE ONE TABLET FOUR TIMES DAILY AS NEEDED FOR PAIN      aspirin 81 MG chewable tablet Take by mouth daily        No current facility-administered medications on file prior to visit. Patient Active Problem List   Diagnosis Code    PTSD (post-traumatic stress disorder) F43.10    Depression F32. A    Pacemaker Z95.0    HTN (hypertension) I10    Spinal stenosis at L4-L5 level M48.061    Depression, major, recurrent, moderate (HCC) F33.1    Ventricular tachycardia I47.20    ICD (implantable cardioverter-defibrillator) battery depletion Z45.02    Coronary artery disease involving native coronary artery of native heart without angina pectoris I25.10 _________________________________________________________  Past Medical History:   Diagnosis Date    Cerebral artery occlusion with cerebral infarction [facility])     Chronic back pain     Coronary artery disease involving native coronary artery of native heart without angina pectoris 2022    Depression     Hyperlipidemia     Hypertension     Pacemaker     Sleep apnea     TIA (transient ischemic attack)     Ventricular tachycardia        Family History   Problem Relation Age of Onset    High Blood Pressure Father     Diabetes Sister     Heart Disease Brother     Diabetes Sister     Diabetes Sister        Past Surgical History:   Procedure Laterality Date    BACK SURGERY      CARDIAC CATHETERIZATION  09/15/2020    Dr. Scott Ny      UPGRADE PPM TO DUAL ICD    ([facility])   DR. Funk 2014    CHOLECYSTECTOMY      EYE SURGERY Left     FOOT SURGERY      VENTRICULOPERITONEAL SHUNT         Social History     Tobacco Use    Smoking status: Former     Packs/day: 1.00     Years: 1.00     Pack years: 1.00     Types: Cigarettes     Quit date:      Years since quittin.0    Smokeless tobacco: Never    Tobacco comments:     quit 30 years   Vaping Use    Vaping Use: Never used   Substance Use Topics    Alcohol use: Not Currently     Comment: rare    Drug use: No       Chart reviewed and updated where appropriate for PMH, Fam, and Soc Hx.  _________________________________________________________  ROS: POSITIVE: As in the HPI. Otherwise Pertinent negatives are negative.    __________________________________________________________  Physical Exam   Constitutional:    He is oriented to person, place, and time. He appears well-developed and well-nourished. Eyes:    Conjunctivae are normal.    Pupils are equal, round, and reactive to light. EOMI. Neck:    Normal range of motion. No thyromegaly or nodules noted. No bruit.  No LAD.  Cardiovascular:    Normal rate, regular rhythm and normal heart sounds. No murmur. No gallop and no friction rub. Pulmonary/Chest:    Effort normal and breath sounds normal.    No wheezes. No rales or rhonchi. Normal chest wall. Pacemaker in appropriate place without any surrounding erythema or tenderness. Abdominal:    Soft. Bowel sounds are normal.    No distension. No tenderness. Musculoskeletal:    Normal range of motion. No joint swelling noted. Trace bilateral peripheral edema. Skin:    Skin is warm and dry. No lesions. Tinea cruris on the left. Psychiatric:    He has a normal mood and affect. Normal groom and dress. No SI or HI.   ________________________________________________________    This note may have been created using dictation software.  Efforts were made to reduce errors, but some may persist.

## 2022-12-22 RX ORDER — FOLIC ACID 1 MG/1
1 TABLET ORAL DAILY
Qty: 90 TABLET | Refills: 1 | Status: SHIPPED | OUTPATIENT
Start: 2022-12-22

## 2022-12-23 ENCOUNTER — TELEPHONE (OUTPATIENT)
Dept: FAMILY MEDICINE CLINIC | Age: 72
End: 2022-12-23

## 2022-12-23 NOTE — LETTER
[address] [place] [address]  Phone: [phone]  Fax: [phone]    Ghislaine Hughes MD        December 23, 2022     [facility] [address]      Dear Peter Medina:    Below are the results from your recent visit:    Resulted Orders   Vitamin B12 & Folate   Result Value Ref Range    Vitamin B-12 739 211 - 946 pg/mL    Folate 4.4 (L) 4.8 - 24.2 ng/mL   Ferritin   Result Value Ref Range    Ferritin 85 ng/mL      Comment:      FERRITIN Reference Ranges:  Adult Males   20 - 60 years:    30 - 400 ng/mL  Adult females 17 - 60 years:    13 - 150 ng/mL  Adults greater than 60 years:   no established reference range  Pediatrics:                     no established reference range     Iron and TIBC   Result Value Ref Range    Iron 115 59 - 158 mcg/dL    TIBC 318 250 - 450 mcg/dL    Iron Saturation 36 20 - 55 %   TSH   Result Value Ref Range    TSH 1.030 0.270 - 4.200 uIU/mL   Comprehensive Metabolic Panel   Result Value Ref Range    Sodium 138 132 - 146 mmol/L    Potassium 4.6 3.5 - 5.0 mmol/L    Chloride 104 98 - 107 mmol/L    CO2 23 22 - 29 mmol/L    Anion Gap 11 7 - 16 mmol/L    Glucose 83 74 - 99 mg/dL    BUN 25 (H) 6 - 23 mg/dL    Creatinine 1.3 (H) 0.7 - 1.2 mg/dL    Est, Glom Filt Rate 58 >=60 mL/min/1.73      Comment:      Pediatric calculator link  Nini.at. org/professionals/kdoqi/gfr_calculatorped  Effective Oct 3, 2022  These results are not intended for use in patients  <25years of age. eGFR results are calculated without  a race factor using the 2021 CKD-EPI equation. Careful  clinical correlation is recommended, particularly when  comparing to results calculated using previous equations. The CKD-EPI equation is less accurate in patients with  extremes of muscle mass, extra-renal metabolism of  creatinine, excessive creatinine ingestion, or following  therapy that affects renal tubular secretion.       Calcium 9.9 8.6 - 10.2 mg/dL    Total Protein 7.1 6.4 - 8.3 g/dL    Albumin 3.9 3.5 - 5.2 g/dL    Total Bilirubin 0.4 0.0 - 1.2 mg/dL    Alkaline Phosphatase 57 40 - 129 U/L    ALT 79 (H) 0 - 40 U/L    AST 47 (H) 0 - 39 U/L   CBC with Auto Differential   Result Value Ref Range    WBC 8.1 4.5 - 11.5 E9/L    RBC 4.75 3.80 - 5.80 E12/L    Hemoglobin 13.3 12.5 - 16.5 g/dL    Hematocrit 37.7 37.0 - 54.0 %    MCV 79.4 (L) 80.0 - 99.9 fL    MCH 28.0 26.0 - 35.0 pg    MCHC 35.3 (H) 32.0 - 34.5 %    RDW 16.0 (H) 11.5 - 15.0 fL    Platelets 154 025 - 266 E9/L    MPV 11.3 7.0 - 12.0 fL    Neutrophils % 62.2 43.0 - 80.0 %    Immature Granulocytes % 0.2 0.0 - 5.0 %    Lymphocytes % 26.6 20.0 - 42.0 %    Monocytes % 8.6 2.0 - 12.0 %    Eosinophils % 1.4 0.0 - 6.0 %    Basophils % 1.0 0.0 - 2.0 %    Neutrophils Absolute 5.02 1.80 - 7.30 E9/L    Immature Granulocytes # 0.02 E9/L    Lymphocytes Absolute 2.14 1.50 - 4.00 E9/L    Monocytes Absolute 0.69 0.10 - 0.95 E9/L    Eosinophils Absolute 0.11 0.05 - 0.50 E9/L    Basophils Absolute 0.08 0.00 - 0.20 E9/L                           The test results overall are looking pretty good.  Your folate level is low though and I am going to send the vitamin to his pharmacy, [facility].  Its possible that this will help you have a little bit more energy. If you have any questions or concerns, please don't hesitate to call.     Sincerely,        Arun Reed MD

## 2022-12-23 NOTE — TELEPHONE ENCOUNTER
----- Message from Chikis Pulliam MD sent at 12/22/2022  5:12 PM EST -----  Please advise patient labs reviewed and overall looking pretty good. His folate level is low though and I am going to send the vitamin to his pharmacy, [facility]. Its possible that this helps him feel little bit more energy.

## 2023-01-16 ENCOUNTER — TELEPHONE (OUTPATIENT)
Dept: NON INVASIVE DIAGNOSTICS | Age: 73
End: 2023-01-16

## 2023-01-16 NOTE — TELEPHONE ENCOUNTER
Spoke to the patient and he said that he had to have a triple bypass which was not found here in the office and he was not very happy and switched providers. Will no longer need our services.

## 2023-01-19 ENCOUNTER — TELEPHONE (OUTPATIENT)
Dept: PODIATRY | Age: 73
End: 2023-01-19

## 2023-01-19 NOTE — TELEPHONE ENCOUNTER
Kamila Remy came into the office today to schedule testing for his foot. He would like someone to call him to help him get this scheduled. Please advise thank you.

## 2023-02-14 ENCOUNTER — TELEPHONE (OUTPATIENT)
Dept: FAMILY MEDICINE CLINIC | Age: 73
End: 2023-02-14

## 2023-02-14 NOTE — TELEPHONE ENCOUNTER
----- Message from Johan Holt sent at 2/14/2023 10:36 AM EST -----  Subject: Referral Request    Reason for referral request? PCP told patient he was going to refer him to   do a Sleep Study. Patient is unsure how to proceed with the referral and   study - there is currently no referral in Epic. Please submit a referral   for the patient and give them a call to advise how to proceed. Provider patient wants to be referred to(if known):     Provider Phone Number(if known): Additional Information for Provider?   ---------------------------------------------------------------------------  --------------  Dutch Packer    [phone];  Do not leave any message, patient will call back for answer  ---------------------------------------------------------------------------  --------------

## 2023-02-14 NOTE — TELEPHONE ENCOUNTER
I ordered labs to explore his fatigue. They were ok except for folate deficiency. I sent a vitamin to his pharmacy. I asked him to follow up and planned to explore if a sleep study is appropriate - and document necessity if it is. Needs to schedule non-urgent follow up.

## 2023-02-16 NOTE — TELEPHONE ENCOUNTER
I spoke to Abdullahi and relayed the message. He will  the script today. After checking his availability he will call back to schedule the follow up.

## 2023-02-28 ENCOUNTER — TELEPHONE (OUTPATIENT)
Dept: FAMILY MEDICINE CLINIC | Age: 73
End: 2023-02-28

## 2023-02-28 NOTE — TELEPHONE ENCOUNTER
----- Message from Lexi Olson sent at 2/28/2023 12:08 PM EST -----  Subject: Referral Request    Reason for referral request? sleep study  Provider patient wants to be referred to(if known):     Provider Phone Number(if known): Additional Information for Provider? Would like it done at [facility]. ---------------------------------------------------------------------------  --------------  Sonia HEREDIA    [phone];  Do not leave any message, patient will call back for answer  ---------------------------------------------------------------------------  --------------

## 2023-02-28 NOTE — TELEPHONE ENCOUNTER
Per note pt is wanting a sleep study done at [address] [place] [address],[address], please advise

## 2023-03-06 NOTE — TELEPHONE ENCOUNTER
Missed last appt (or cancelled). Sleep studies require use to document things at an offie visit. I lkely can't just order one as his insurance won't cover it without the documentation that its needed. Does he have known sleep apnea or use a CPAP machine? If not, then will need to have an appointment to document necessity for insurance.

## 2023-03-07 NOTE — TELEPHONE ENCOUNTER
Jeane Winter called back and was advised. He is willing to come to discuss Sleep Apnea with you. I have tried several days and times and find only blocked openings. When would you like to see Jeane Winter?

## 2023-03-21 ENCOUNTER — OFFICE VISIT (OUTPATIENT)
Dept: FAMILY MEDICINE CLINIC | Age: 73
End: 2023-03-21
Payer: COMMERCIAL

## 2023-03-21 VITALS
OXYGEN SATURATION: 96 % | HEIGHT: 72 IN | BODY MASS INDEX: 32.51 KG/M2 | WEIGHT: 240 LBS | HEART RATE: 59 BPM | TEMPERATURE: 97.6 F | DIASTOLIC BLOOD PRESSURE: 78 MMHG | SYSTOLIC BLOOD PRESSURE: 136 MMHG

## 2023-03-21 DIAGNOSIS — G89.29 CHRONIC RIGHT SHOULDER PAIN: ICD-10-CM

## 2023-03-21 DIAGNOSIS — G89.29 CHRONIC KNEE PAIN AFTER TOTAL REPLACEMENT OF LEFT KNEE JOINT: ICD-10-CM

## 2023-03-21 DIAGNOSIS — G47.33 OSA (OBSTRUCTIVE SLEEP APNEA): Primary | ICD-10-CM

## 2023-03-21 DIAGNOSIS — G89.29 CHRONIC PAIN OF RIGHT KNEE: ICD-10-CM

## 2023-03-21 DIAGNOSIS — M25.561 CHRONIC PAIN OF RIGHT KNEE: ICD-10-CM

## 2023-03-21 DIAGNOSIS — Z96.652 CHRONIC KNEE PAIN AFTER TOTAL REPLACEMENT OF LEFT KNEE JOINT: ICD-10-CM

## 2023-03-21 DIAGNOSIS — M25.511 CHRONIC RIGHT SHOULDER PAIN: ICD-10-CM

## 2023-03-21 DIAGNOSIS — M25.562 CHRONIC KNEE PAIN AFTER TOTAL REPLACEMENT OF LEFT KNEE JOINT: ICD-10-CM

## 2023-03-21 PROCEDURE — 3074F SYST BP LT 130 MM HG: CPT | Performed by: FAMILY MEDICINE

## 2023-03-21 PROCEDURE — 3078F DIAST BP <80 MM HG: CPT | Performed by: FAMILY MEDICINE

## 2023-03-21 PROCEDURE — 99213 OFFICE O/P EST LOW 20 MIN: CPT | Performed by: FAMILY MEDICINE

## 2023-03-21 PROCEDURE — 1123F ACP DISCUSS/DSCN MKR DOCD: CPT | Performed by: FAMILY MEDICINE

## 2023-03-21 RX ORDER — ONDANSETRON 4 MG/1
4 TABLET, FILM COATED ORAL EVERY 8 HOURS PRN
Qty: 15 TABLET | Refills: 0 | Status: SHIPPED | OUTPATIENT
Start: 2023-03-21

## 2023-03-21 ASSESSMENT — PATIENT HEALTH QUESTIONNAIRE - PHQ9
1. LITTLE INTEREST OR PLEASURE IN DOING THINGS: 1
SUM OF ALL RESPONSES TO PHQ QUESTIONS 1-9: 2
SUM OF ALL RESPONSES TO PHQ9 QUESTIONS 1 & 2: 2
2. FEELING DOWN, DEPRESSED OR HOPELESS: 1
SUM OF ALL RESPONSES TO PHQ QUESTIONS 1-9: 2

## 2023-03-21 NOTE — PROGRESS NOTES
attack)     Ventricular tachycardia        Family History   Problem Relation Age of Onset    High Blood Pressure Father     Diabetes Sister     Heart Disease Brother     Diabetes Sister     Diabetes Sister        Past Surgical History:   Procedure Laterality Date    BACK SURGERY      CARDIAC CATHETERIZATION  09/15/2020    Dr. Tushar Fernandez      UPGRADE PPM TO DUAL ICD    ([facility]SCI)    187 2014    CHOLECYSTECTOMY  1999    EYE SURGERY Left     FOOT SURGERY      VENTRICULOPERITONEAL SHUNT         Social History     Tobacco Use    Smoking status: Former     Packs/day: 1.00     Years: 1.00     Pack years: 1.00     Types: Cigarettes     Quit date:      Years since quittin.2    Smokeless tobacco: Never    Tobacco comments:     quit 30 years   Vaping Use    Vaping Use: Never used   Substance Use Topics    Alcohol use: Not Currently     Comment: rare    Drug use: No       Chart reviewed and updated where appropriate for PMH, Fam, and Soc Hx.  _________________________________________________________  ROS: POSITIVE: As in the HPI. Otherwise Pertinent negatives are negative.    __________________________________________________________  Physical Exam   Constitutional:    He is oriented to person, place, and time. He appears well-developed and well-nourished. Cardiovascular:    Normal rate, regular rhythm and normal heart sounds. No murmur. No gallop and no friction rub. Pulmonary/Chest:    Effort normal and breath sounds normal.    No wheezes. No rales or rhonchi. Musculoskeletal:    Normal range of motion. As in [address] with right shoulder. +impingement and rot cuff signs. No joint swelling noted. No peripheral edema. Skin:    Skin is warm and dry. No rashes, lesions. Psychiatric:    He has a normal mood and affect. Normal groom and dress. No SI or HI.

## 2023-03-24 ENCOUNTER — TELEPHONE (OUTPATIENT)
Dept: FAMILY MEDICINE CLINIC | Age: 73
End: 2023-03-24

## 2023-03-24 NOTE — TELEPHONE ENCOUNTER
----- Message from Cameron Fernandes MD sent at 3/23/2023 11:04 AM EDT -----  Sleep study ordered from home and Ortho ref as well. Please print both and arrange - will need faxed.

## 2023-05-11 ENCOUNTER — TELEPHONE (OUTPATIENT)
Dept: FAMILY MEDICINE CLINIC | Age: 73
End: 2023-05-11

## 2023-05-11 NOTE — TELEPHONE ENCOUNTER
Adelia Gregory called to request an appointment with Dr Dianna Ha. States that he was in [place] [address] and transferred to [facility]. He was discharged 5/1/23 and had a heart ablation. Follow up appointment scheduled for tomorrow. Patient also reports fatigue. Fells it is related to untreated MEL. He was referred to [facility] lab in March and has not been able to schedule. Reviewed chart and saw where we spoke w/ [employer], the company that handles sleep testing for [place], in April and confirmed that the referral went through. We had issues reaching the patient and his wife because they did not answer and do not have voicemail set up. I provided the phone number for Adelia Gregory to call. Stressed the importance of leaving a message if they do not answer and to be available at the number he provides since they cannot leave a message. [employer] Phone [phone]  Fax [phone]    Refaxed referral and information just in case they cancelled request b/c they could not reach patient.

## 2023-05-12 ENCOUNTER — OFFICE VISIT (OUTPATIENT)
Dept: FAMILY MEDICINE CLINIC | Age: 73
End: 2023-05-12
Payer: COMMERCIAL

## 2023-05-12 VITALS
BODY MASS INDEX: 31.56 KG/M2 | SYSTOLIC BLOOD PRESSURE: 130 MMHG | HEIGHT: 72 IN | HEART RATE: 84 BPM | DIASTOLIC BLOOD PRESSURE: 94 MMHG | TEMPERATURE: 98.2 F | OXYGEN SATURATION: 98 % | WEIGHT: 233 LBS

## 2023-05-12 DIAGNOSIS — G47.33 OBSTRUCTIVE SLEEP APNEA: ICD-10-CM

## 2023-05-12 DIAGNOSIS — I47.20 VENTRICULAR TACHYCARDIA (HCC): Primary | ICD-10-CM

## 2023-05-12 PROCEDURE — 1123F ACP DISCUSS/DSCN MKR DOCD: CPT | Performed by: FAMILY MEDICINE

## 2023-05-12 PROCEDURE — 99214 OFFICE O/P EST MOD 30 MIN: CPT | Performed by: FAMILY MEDICINE

## 2023-05-12 PROCEDURE — 3074F SYST BP LT 130 MM HG: CPT | Performed by: FAMILY MEDICINE

## 2023-05-12 PROCEDURE — 3078F DIAST BP <80 MM HG: CPT | Performed by: FAMILY MEDICINE

## 2023-05-12 RX ORDER — LOSARTAN POTASSIUM 100 MG/1
100 TABLET ORAL
COMMUNITY
Start: 2023-04-22

## 2023-05-12 RX ORDER — HYDRALAZINE HYDROCHLORIDE 25 MG/1
25 TABLET, FILM COATED ORAL
COMMUNITY
Start: 2023-05-02

## 2023-05-12 RX ORDER — ISOSORBIDE DINITRATE 20 MG/1
1 TABLET ORAL 3 TIMES DAILY
COMMUNITY
Start: 2023-05-02

## 2023-05-12 RX ORDER — MAGNESIUM OXIDE 400 MG/1
200 TABLET ORAL DAILY
Qty: 15 TABLET | Refills: 31 | COMMUNITY
Start: 2020-10-24 | End: 2023-06-01

## 2023-05-12 RX ORDER — AMIODARONE HYDROCHLORIDE 200 MG/1
200 TABLET ORAL
COMMUNITY
Start: 2023-05-02

## 2023-05-12 RX ORDER — FINASTERIDE 5 MG/1
5 TABLET, FILM COATED ORAL DAILY
COMMUNITY
Start: 2023-03-21

## 2023-05-17 NOTE — PROGRESS NOTES
[facility]  Office Progress Note - Dr. Leroy Maxwell  5/12/23    CC:   Chief Complaint   Patient presents with    Follow-Up from Hospital     [facility] 05/01/23  Heart ablation  He would like doctor to discuss this procedure that he had done and any restrictions that he may have morning forward. BP (!) 130/94   Pulse 84   Temp 98.2 °F (36.8 °C) (Temporal)   Ht 6' (1.829 m)   Wt 233 lb (105.7 kg)   SpO2 98%   BMI 31.60 kg/m²   Wt Readings from Last 3 Encounters:   05/12/23 233 lb (105.7 kg)   03/21/23 240 lb (108.9 kg)   12/21/22 232 lb (105.2 kg)       HPI: Patient was admitted to [facility] with ventricular tachycardia. His ICD was shocking him. He had a heart catheterization, was treated with sotalol, and ultimately transferred to the [facility] clinic to talk about ablation. Sotalol was discontinued at [facility] clinic and he was started on amiodarone and lidocaine. He had an echocardiogram performed which did not show significant valvular heart disease. On the monitor he had no sustained episodes of ventricular tachycardia. He was taken for ventricular tachycardia ablation. He had sustained ventricular tachycardia overnight which self terminated. He continued to have nonsustained ventricular tachycardia. His beta-blocker was increased. He eventually did go on to have the ablation. He had nonsustained ventricular tachycardia following the procedure that was asymptomatic and remained hemodynamically stable. He was started back on amiodarone. THINGS TO DO/COMMUNICATE:  4/28/23 underwent successful VT ablation  4/29-5/1 asymptomatic NSVT on telemetry  5/1/23 started on amiodarone taper, no VT on telemetry  Follow-up in 4-6 weeks with Dr. Fiona Shaver       Patient largely did not understand what happened during the hospitalizations, but is accurately taking his medication as discharged. He is not aware of any need for follow-up with Dr. Priscilla Vann.   He does believe that he is

## 2023-05-19 DIAGNOSIS — I10 ESSENTIAL HYPERTENSION: ICD-10-CM

## 2023-05-22 RX ORDER — LISINOPRIL 30 MG/1
TABLET ORAL
Qty: 90 TABLET | Refills: 3 | OUTPATIENT
Start: 2023-05-22

## 2023-05-22 NOTE — TELEPHONE ENCOUNTER
Refill is for lisinopril 30. Med records show he is also or actually on losartan 100mg daily. Can't be on both meds and recently had hospitalization. Was not sure of meds at last appt. Please reconcile meds with him over phone and see what he is actually taking.

## 2023-05-22 NOTE — TELEPHONE ENCOUNTER
Reviewed discharge summary and confirmed that hospital d/c lisinopril 30mg and started losartan 100mg qd and sotalol 80mg bid. I tried to reach patient to make sure he stopped Lisinopril but her did not answer and his mailbox was full.      I will refuse refill that pharmacy requested

## 2023-05-23 DIAGNOSIS — I10 ESSENTIAL HYPERTENSION: ICD-10-CM

## 2023-05-23 RX ORDER — LISINOPRIL 30 MG/1
TABLET ORAL
Qty: 90 TABLET | Refills: 1 | OUTPATIENT
Start: 2023-05-23

## 2023-05-24 RX ORDER — METOPROLOL SUCCINATE 100 MG/1
100 TABLET, EXTENDED RELEASE ORAL 2 TIMES DAILY
COMMUNITY
Start: 2023-05-02

## 2023-05-24 RX ORDER — TAMSULOSIN HYDROCHLORIDE 0.4 MG/1
0.4 CAPSULE ORAL NIGHTLY
COMMUNITY

## 2023-05-24 NOTE — TELEPHONE ENCOUNTER
Spoke w/ Addison Riojas, he wanted information on overnight pulse ox and when he was to come to the office for a 6 min walk. I let him know that we faxed the orders to [place] [id number] lab for the overnight pulse ox testing yesterday. I did not see a 6 min walk mentioned in his most recent office note w/ you, is this something you would like him to do? He wanted you to know that he is willing to try a mask or nasal canula. States that when he was at [facility] it made his nose run but he did feel more refreshed. States that [facility] had a mask on him that was similar to what you would wear if you were painting. It moved around on his face but was tolerable. I reviewed patient's medications with him and updated his med list. Confirmed that patient is not taking Lisinopril or Losartan. Metoprolol dose was increased from 50mg qd to 100mg bid. He was also started on Tamsulosin 04.mg and is still taking finasteride. Escitalopram was showing as 10mg in chart but patient is taking 20mg now. Patient is out of omeprazole but would like to use it for acid reflux. Can you send Rx to [facility]? Patient is scheduled to see Dr Paula San and have his device checked on 6/7/23.

## 2023-05-24 NOTE — TELEPHONE ENCOUNTER
Spoke w/ patient, Lisinopril and losartan were both d/c. Taking metoprolol succinate 100mg bid, isosorbide dinitrate 20mg tid, hydralazine 25mg q 8hrs, and amiodarone 200mg qd.

## 2023-05-24 NOTE — TELEPHONE ENCOUNTER
----- Message from Norah Bustamante sent at 5/24/2023  8:58 AM EDT -----  Subject: Message to Provider    QUESTIONS  Information for Provider? Patient has some questions regarding his last   appt and would like someone to call him with his instructions on what to   do before his next appt.   ---------------------------------------------------------------------------  --------------  Fang GALAN  [phone]; OK to leave message on voicemail  ---------------------------------------------------------------------------  --------------  SCRIPT ANSWERS  Relationship to Patient?  Self

## 2023-05-26 RX ORDER — OMEPRAZOLE 20 MG/1
20 CAPSULE, DELAYED RELEASE ORAL DAILY
Qty: 90 CAPSULE | Refills: 3 | Status: SHIPPED | OUTPATIENT
Start: 2023-05-26

## 2023-06-28 ENCOUNTER — TELEPHONE (OUTPATIENT)
Dept: FAMILY MEDICINE CLINIC | Age: 73
End: 2023-06-28

## 2023-08-01 ENCOUNTER — TELEPHONE (OUTPATIENT)
Dept: FAMILY MEDICINE CLINIC | Age: 73
End: 2023-08-01

## 2023-08-01 NOTE — TELEPHONE ENCOUNTER
Patient returned call and he is not willing to drive to [facility] or [place] to [facility] all night\". He said he would \"figure it out\". Patient was not clear what he meant by that.

## 2023-08-01 NOTE — TELEPHONE ENCOUNTER
Patient's insurance denied sleep study at [facility] and [facility]. Neither facility is in patient's network. There is a list of approved facilities, none are local to this area. I understand patient wanted to stay local.  Attempted to call patient to confirm. No answer and unable to leave message d/t mailbox full.

## 2023-09-26 ENCOUNTER — TELEPHONE (OUTPATIENT)
Dept: FAMILY MEDICINE CLINIC | Age: 73
End: 2023-09-26

## 2023-09-26 NOTE — TELEPHONE ENCOUNTER
----- Message from Oswaldo Loya sent at 9/25/2023  1:35 PM EDT -----  Subject: Message to Provider    QUESTIONS  Information for Provider? Patient would like to establish care with Dr. Hugo Mahajan, he is a [address] and would like for his new PCP to be   a  too. Please call back to advise. Ask for a text back. ---------------------------------------------------------------------------  --------------  Catina BLANCO  [phone]; OK to leave message on voicemail  ---------------------------------------------------------------------------  --------------  SCRIPT ANSWERS  Relationship to Patient?  Self

## 2023-09-26 NOTE — TELEPHONE ENCOUNTER
These calls are tough. I closed my practice to new patients over a year ago as I am going to be 69 this year and likely will be full time for only 1 more year. It would not be in patients best intersest to establish with me and then go through the whole process over again. I read Dr. Rodolfo Jain note and I agree with him completely and felt that he handled the case exactly as I would have done. Perhaps his best bet would be to stick with [facility] and establish at the [facility] so he could take advantages of the services they offer in addition to what he was getting with us. I am proud of my service but I was not [facility] so he might have been disappointed anyway. Thanks for asking.

## 2023-11-08 LAB
ALBUMIN: 3.9 G/DL
ALP BLD-CCNC: 51 U/L
ALT SERPL-CCNC: 32 U/L
ANION GAP SERPL CALCULATED.3IONS-SCNC: 12.5 MMOL/L
AST SERPL-CCNC: 24 U/L
BASOPHILS ABSOLUTE: 0 /ΜL
BASOPHILS RELATIVE PERCENT: 0.2 %
BILIRUB SERPL-MCNC: 0.4 MG/DL (ref 0.1–1.4)
BILIRUBIN, URINE: NEGATIVE
BLOOD, URINE: POSITIVE
BUN BLDV-MCNC: 36 MG/DL
CALCIUM SERPL-MCNC: 9.4 MG/DL
CHLORIDE BLD-SCNC: 107 MMOL/L
CHOLESTEROL, TOTAL: 122 MG/DL
CHOLESTEROL/HDL RATIO: NORMAL
CLARITY, UA: CLEAR
CO2: 24 MMOL/L
COLOR, UA: NORMAL
CREAT SERPL-MCNC: 1.5 MG/DL
EOSINOPHILS ABSOLUTE: 0.2 /ΜL
EOSINOPHILS RELATIVE PERCENT: 2.6 %
GFR, ESTIMATED: 49
GLUCOSE BLD-MCNC: 89 MG/DL
GLUCOSE URINE: NEGATIVE
HCT VFR BLD CALC: 36.3 % (ref 41–53)
HDLC SERPL-MCNC: 40 MG/DL (ref 35–70)
HEMOGLOBIN: 12.2 G/DL (ref 13.5–17.5)
KETONES, URINE: NEGATIVE
LDL CHOLESTEROL: 68
LEUKOCYTE ESTERASE, URINE: NEGATIVE
LYMPHOCYTES ABSOLUTE: 2.6 /ΜL
LYMPHOCYTES RELATIVE PERCENT: 37.5 %
MCH RBC QN AUTO: 28.9 PG
MCHC RBC AUTO-ENTMCNC: 33.7 G/DL
MCV RBC AUTO: 85.7 FL
MONOCYTES ABSOLUTE: 0.7 /ΜL
MONOCYTES RELATIVE PERCENT: 9.8 %
NEUTROPHILS ABSOLUTE: 3.5 /ΜL
NEUTROPHILS RELATIVE PERCENT: 49.9 %
NITRITE, URINE: NEGATIVE
NONHDLC SERPL-MCNC: NORMAL MG/DL
PH UA: 5 (ref 4.5–8)
PLATELET # BLD: 155 K/ΜL
PMV BLD AUTO: 9.8 FL
POTASSIUM SERPL-SCNC: 4.5 MMOL/L
PROTEIN UA: NEGATIVE
RBC # BLD: 4.23 10^6/ΜL
SODIUM BLD-SCNC: 139 MMOL/L
SPECIFIC GRAVITY UA: 1.02 (ref 1–1.03)
TOTAL PROTEIN: 7.2 G/DL (ref 6.4–8.2)
TRIGL SERPL-MCNC: 131 MG/DL
TSH SERPL DL<=0.05 MIU/L-ACNC: 1.18 UIU/ML
UROBILINOGEN, URINE: NORMAL
VLDLC SERPL CALC-MCNC: NORMAL MG/DL
WBC # BLD: 7 10^3/ML

## 2023-11-28 ENCOUNTER — TELEPHONE (OUTPATIENT)
Dept: FAMILY MEDICINE CLINIC | Age: 73
End: 2023-11-28

## 2023-11-28 ENCOUNTER — OFFICE VISIT (OUTPATIENT)
Dept: FAMILY MEDICINE CLINIC | Age: 73
End: 2023-11-28
Payer: COMMERCIAL

## 2023-11-28 VITALS
OXYGEN SATURATION: 98 % | TEMPERATURE: 97.1 F | SYSTOLIC BLOOD PRESSURE: 142 MMHG | HEART RATE: 80 BPM | DIASTOLIC BLOOD PRESSURE: 96 MMHG | BODY MASS INDEX: 33.59 KG/M2 | HEIGHT: 72 IN | WEIGHT: 248 LBS

## 2023-11-28 DIAGNOSIS — I50.22 CHRONIC SYSTOLIC CONGESTIVE HEART FAILURE (HCC): ICD-10-CM

## 2023-11-28 DIAGNOSIS — Z89.412 HISTORY OF AMPUTATION OF LEFT GREAT TOE (HCC): ICD-10-CM

## 2023-11-28 DIAGNOSIS — D69.6 THROMBOCYTOPENIA, UNSPECIFIED (HCC): ICD-10-CM

## 2023-11-28 DIAGNOSIS — M54.50 CHRONIC BILATERAL LOW BACK PAIN WITHOUT SCIATICA: Primary | ICD-10-CM

## 2023-11-28 DIAGNOSIS — F33.1 DEPRESSION, MAJOR, RECURRENT, MODERATE (HCC): ICD-10-CM

## 2023-11-28 DIAGNOSIS — G89.29 CHRONIC BILATERAL LOW BACK PAIN WITHOUT SCIATICA: Primary | ICD-10-CM

## 2023-11-28 PROCEDURE — 1123F ACP DISCUSS/DSCN MKR DOCD: CPT | Performed by: FAMILY MEDICINE

## 2023-11-28 PROCEDURE — 3080F DIAST BP >= 90 MM HG: CPT | Performed by: FAMILY MEDICINE

## 2023-11-28 PROCEDURE — 3077F SYST BP >= 140 MM HG: CPT | Performed by: FAMILY MEDICINE

## 2023-11-28 PROCEDURE — 99214 OFFICE O/P EST MOD 30 MIN: CPT | Performed by: FAMILY MEDICINE

## 2023-11-28 RX ORDER — TAMSULOSIN HYDROCHLORIDE 0.4 MG/1
0.4 CAPSULE ORAL NIGHTLY
Qty: 90 CAPSULE | Refills: 1 | Status: SHIPPED | OUTPATIENT
Start: 2023-11-28

## 2023-11-28 RX ORDER — LIDOCAINE 50 MG/G
1 PATCH TOPICAL DAILY
Qty: 30 PATCH | Refills: 0 | Status: SHIPPED | OUTPATIENT
Start: 2023-11-28

## 2023-11-28 NOTE — PROGRESS NOTES
DEFIBRILLATOR PLACEMENT      UPGRADE PPM TO DUAL ICD    ([employer]SCI)   DR. Gonzalo Diaz     CARDIAC PACEMAKER PLACEMENT      CHOLECYSTECTOMY      EYE SURGERY Left     FOOT SURGERY      VENTRICULOPERITONEAL SHUNT         Social History     Tobacco Use    Smoking status: Former     Packs/day: 1.00     Years: 1.00     Additional pack years: 0.00     Total pack years: 1.00     Types: Cigarettes     Quit date:      Years since quittin.9    Smokeless tobacco: Never    Tobacco comments:     quit 30 years   Vaping Use    Vaping Use: Never used   Substance Use Topics    Alcohol use: Not Currently     Comment: rare    Drug use: No       Chart reviewed and updated where appropriate for PMH, Fam, and Soc Hx.  _________________________________________________________  ROS: POSITIVE: As in the HPI. Otherwise Pertinent negatives are negative.    __________________________________________________________  Physical Exam   Constitutional:    He is oriented to person, place, and time. He appears well-developed and well-nourished. HENT:   Eyes:    Conjunctivae are normal.    Pupils are equal, round, and reactive to light. EOMI. Neck:    Normal range of motion. No thyromegaly or nodules noted. No bruit. No LAD. Cardiovascular:    Normal rate, regular rhythm and normal heart sounds. No murmur. No gallop and no friction rub. Pulmonary/Chest:    Effort normal and breath sounds normal.    No wheezes. No rales or rhonchi. Musculoskeletal:    Decreased range of motion of the lumbar spine with flexion extension and rotation. Tight. Causes some pain. No joint swelling noted. No peripheral edema. Neurological:    He is A&Ox3. Motor and sensation grossly intact. Normal Gait. Skin:    Skin is warm and dry. No rashes, lesions. Psychiatric:    He has a normal mood and affect. Normal groom and dress.  No SI or HI.   ________________________________________________________    This

## 2023-11-28 NOTE — TELEPHONE ENCOUNTER
Need a couple things for patient.    Please call him and arrange a med reconciliation telephone call (or if he would like to come into the office with all of his medications we can do that a nursing visit).  I would like him to have all of his bottles in front of him and reconcile that with what we have in the medication list.      Please contact [facility] Dr. Rogel.   Please have them send most recent couple progress notes and any recent lab work patient has had done.

## 2023-12-01 ENCOUNTER — OFFICE VISIT (OUTPATIENT)
Dept: ORTHOPEDIC SURGERY | Age: 73
End: 2023-12-01

## 2023-12-01 VITALS — BODY MASS INDEX: 33.59 KG/M2 | WEIGHT: 248 LBS | HEIGHT: 72 IN

## 2023-12-01 DIAGNOSIS — Z96.652 HISTORY OF TOTAL KNEE ARTHROPLASTY, LEFT: ICD-10-CM

## 2023-12-01 DIAGNOSIS — M70.52 PES ANSERINUS BURSITIS OF LEFT KNEE: ICD-10-CM

## 2023-12-01 DIAGNOSIS — G89.29 CHRONIC PAIN OF BOTH KNEES: ICD-10-CM

## 2023-12-01 DIAGNOSIS — M25.562 CHRONIC PAIN OF BOTH KNEES: ICD-10-CM

## 2023-12-01 DIAGNOSIS — M17.11 PRIMARY OSTEOARTHRITIS OF RIGHT KNEE: Primary | ICD-10-CM

## 2023-12-01 DIAGNOSIS — M25.561 CHRONIC PAIN OF BOTH KNEES: ICD-10-CM

## 2023-12-01 RX ORDER — LIDOCAINE HYDROCHLORIDE 10 MG/ML
1 INJECTION, SOLUTION INFILTRATION; PERINEURAL ONCE
Status: COMPLETED | OUTPATIENT
Start: 2023-12-01 | End: 2023-12-01

## 2023-12-01 RX ORDER — BUPIVACAINE HYDROCHLORIDE 2.5 MG/ML
1 INJECTION, SOLUTION INFILTRATION; PERINEURAL ONCE
Status: COMPLETED | OUTPATIENT
Start: 2023-12-01 | End: 2023-12-01

## 2023-12-01 RX ORDER — TRIAMCINOLONE ACETONIDE 40 MG/ML
40 INJECTION, SUSPENSION INTRA-ARTICULAR; INTRAMUSCULAR ONCE
Status: COMPLETED | OUTPATIENT
Start: 2023-12-01 | End: 2023-12-01

## 2023-12-01 RX ADMIN — LIDOCAINE HYDROCHLORIDE 1 ML: 10 INJECTION, SOLUTION INFILTRATION; PERINEURAL at 11:48

## 2023-12-01 RX ADMIN — TRIAMCINOLONE ACETONIDE 40 MG: 40 INJECTION, SUSPENSION INTRA-ARTICULAR; INTRAMUSCULAR at 11:47

## 2023-12-01 RX ADMIN — BUPIVACAINE HYDROCHLORIDE 2.5 MG: 2.5 INJECTION, SOLUTION INFILTRATION; PERINEURAL at 11:45

## 2023-12-01 NOTE — PROGRESS NOTES
[facility]  ORTHOPAEDICS   DATE OF VISIT: 12/01/23  New Knee Patient     Referring Provider:   Unknown, Provider, DO  No address on file    CHIEF COMPLAINT:   Chief Complaint   Patient presents with    Knee Pain     Patient is here today for bilateral knee pain X 5 years. Previous PT of Dr. Gigi Erazo. Previous L TKA 2020. HPI:      Juan Santamaria is a 75y.o. year old male who is seen today  for evaluation of bilateral  knee pain. He reports the pain has been ongoing for the past number of months. He does not recall a specific injury which started the pain. He does have a history of left TKA done by Dr. Gigi Erazo. He reports the pain is worse with ambulation , better with rest.  The patient does not have mechanical symptoms. He denies a feeling of instability. The prior treatments have been nothing. The patient has not responded to the treatment. The patient is not working. The patients occupation is retired.     PAST MEDICAL HISTORY  Past Medical History:   Diagnosis Date    Cerebral artery occlusion with cerebral infarction [facility])     Chronic back pain     Chronic systolic congestive heart failure ([address]) 11/28/2023    Coronary artery disease involving native coronary artery of native heart without angina pectoris 7/5/2022    Depression     Hyperlipidemia     Hypertension     Pacemaker     Sleep apnea     TIA (transient ischemic attack)     Ventricular tachycardia ([address])        PAST SURGICAL HISTORY  Past Surgical History:   Procedure Laterality Date    BACK SURGERY      CARDIAC CATHETERIZATION  09/15/2020    Dr. Marline Hanks  9/223/20    UPGRADE PPM TO DUAL ICD    ([employer])   DR. Ludmila Seth PLACEMENT  2014    CHOLECYSTECTOMY  1999    EYE SURGERY Left     FOOT SURGERY      KNEE SURGERY Left 2020    Dr. Liseth Renae    VENTRICULOPERITONEAL SHUNT  1994         FAMILY HISTORY   Family History   Problem Relation Age of Onset    High Blood Pressure Father

## 2023-12-04 NOTE — TELEPHONE ENCOUNTER
Dr. Rogel med rec:  Phone: [phone]  Will send last 2 OV notes.  I was told they do not do lab work there.    Pt reports that he is taking:  Atorvastatin 20mg once daily  Magnesium ox 420mg taking half daily  Metoprolol 50mg once daily.   Lexapro 5mg 3 tabs daily with dinner  Sacubitril 49mg BID   Zinc 50mg daily  Buspar 5mg BID  Amiodarone 200mg once daily    Will reconcile meds within chart momentarily.

## 2024-02-13 ENCOUNTER — TELEPHONE (OUTPATIENT)
Dept: ORTHOPEDIC SURGERY | Age: 74
End: 2024-02-13

## 2024-02-13 NOTE — TELEPHONE ENCOUNTER
Patient presented to the [facility] clinic with c/o bilateral knee pain. Stated that at his last office visit with Dr Stanford, he had CSI which did not provide him with much relief. Patient is interested in possible gel injections and stated that he is having a difficult time walking. Patient provided with the [place] office number and advised to contact Dr Stanford's office for possible further treatment options. I let the patient know that I would send Dr Stanford and his staff a message regarding his continuing knee pain.

## 2024-03-14 ENCOUNTER — TRANSCRIBE ORDERS (OUTPATIENT)
Dept: ADMINISTRATIVE | Age: 74
End: 2024-03-14

## 2024-03-14 DIAGNOSIS — M79.661 BILATERAL CALF PAIN: Primary | ICD-10-CM

## 2024-03-14 DIAGNOSIS — M79.662 BILATERAL CALF PAIN: Primary | ICD-10-CM

## 2024-03-15 ENCOUNTER — HOSPITAL ENCOUNTER (OUTPATIENT)
Dept: ULTRASOUND IMAGING | Age: 74
End: 2024-03-15
Attending: INTERNAL MEDICINE
Payer: MEDICARE

## 2024-03-15 DIAGNOSIS — M79.661 BILATERAL CALF PAIN: ICD-10-CM

## 2024-03-15 DIAGNOSIS — M79.662 BILATERAL CALF PAIN: ICD-10-CM

## 2024-03-15 PROCEDURE — 93971 EXTREMITY STUDY: CPT

## 2024-04-12 ENCOUNTER — TELEPHONE (OUTPATIENT)
Dept: FAMILY MEDICINE CLINIC | Age: 74
End: 2024-04-12

## 2024-04-12 NOTE — TELEPHONE ENCOUNTER
Patient is wondering if there is a way for him to get a sleep study done for sleep apnea, a stress test and a pulmonary function test done closer to home.   Patient stated that [facility] wants him to go there to get this done, but this would be a far drive for patient.       Patient does have an appointment with Dr. Swift on 04/16/2024.   Patient was informed that doctor is out of the office and may not reply until Monday.     Patient gave a verbal understanding.

## 2024-04-16 ENCOUNTER — OFFICE VISIT (OUTPATIENT)
Dept: FAMILY MEDICINE CLINIC | Age: 74
End: 2024-04-16
Payer: MEDICARE

## 2024-04-16 ENCOUNTER — TELEPHONE (OUTPATIENT)
Dept: FAMILY MEDICINE CLINIC | Age: 74
End: 2024-04-16

## 2024-04-16 VITALS
BODY MASS INDEX: 32.37 KG/M2 | HEIGHT: 72 IN | OXYGEN SATURATION: 95 % | TEMPERATURE: 97.7 F | SYSTOLIC BLOOD PRESSURE: 122 MMHG | DIASTOLIC BLOOD PRESSURE: 86 MMHG | WEIGHT: 239 LBS | HEART RATE: 79 BPM

## 2024-04-16 DIAGNOSIS — I50.22 CHRONIC SYSTOLIC CONGESTIVE HEART FAILURE (HCC): ICD-10-CM

## 2024-04-16 DIAGNOSIS — Z86.79 HISTORY OF VENTRICULAR TACHYCARDIA: ICD-10-CM

## 2024-04-16 DIAGNOSIS — F33.1 DEPRESSION, MAJOR, RECURRENT, MODERATE (HCC): ICD-10-CM

## 2024-04-16 DIAGNOSIS — N52.9 ERECTILE DYSFUNCTION, UNSPECIFIED ERECTILE DYSFUNCTION TYPE: Primary | ICD-10-CM

## 2024-04-16 PROCEDURE — 3079F DIAST BP 80-89 MM HG: CPT | Performed by: FAMILY MEDICINE

## 2024-04-16 PROCEDURE — 1123F ACP DISCUSS/DSCN MKR DOCD: CPT | Performed by: FAMILY MEDICINE

## 2024-04-16 PROCEDURE — 3074F SYST BP LT 130 MM HG: CPT | Performed by: FAMILY MEDICINE

## 2024-04-16 PROCEDURE — 99214 OFFICE O/P EST MOD 30 MIN: CPT | Performed by: FAMILY MEDICINE

## 2024-04-16 RX ORDER — ALBUTEROL SULFATE 90 UG/1
2 AEROSOL, METERED RESPIRATORY (INHALATION) 4 TIMES DAILY PRN
Qty: 18 G | Refills: 0 | Status: SHIPPED | OUTPATIENT
Start: 2024-04-16

## 2024-04-16 ASSESSMENT — PATIENT HEALTH QUESTIONNAIRE - PHQ9
8. MOVING OR SPEAKING SO SLOWLY THAT OTHER PEOPLE COULD HAVE NOTICED. OR THE OPPOSITE, BEING SO FIGETY OR RESTLESS THAT YOU HAVE BEEN MOVING AROUND A LOT MORE THAN USUAL: MORE THAN HALF THE DAYS
SUM OF ALL RESPONSES TO PHQ QUESTIONS 1-9: 19
9. THOUGHTS THAT YOU WOULD BE BETTER OFF DEAD, OR OF HURTING YOURSELF: NOT AT ALL
SUM OF ALL RESPONSES TO PHQ9 QUESTIONS 1 & 2: 5
SUM OF ALL RESPONSES TO PHQ QUESTIONS 1-9: 19
10. IF YOU CHECKED OFF ANY PROBLEMS, HOW DIFFICULT HAVE THESE PROBLEMS MADE IT FOR YOU TO DO YOUR WORK, TAKE CARE OF THINGS AT HOME, OR GET ALONG WITH OTHER PEOPLE: VERY DIFFICULT
7. TROUBLE CONCENTRATING ON THINGS, SUCH AS READING THE NEWSPAPER OR WATCHING TELEVISION: NEARLY EVERY DAY
1. LITTLE INTEREST OR PLEASURE IN DOING THINGS: NEARLY EVERY DAY
SUM OF ALL RESPONSES TO PHQ QUESTIONS 1-9: 19
6. FEELING BAD ABOUT YOURSELF - OR THAT YOU ARE A FAILURE OR HAVE LET YOURSELF OR YOUR FAMILY DOWN: SEVERAL DAYS
4. FEELING TIRED OR HAVING LITTLE ENERGY: NEARLY EVERY DAY
2. FEELING DOWN, DEPRESSED OR HOPELESS: MORE THAN HALF THE DAYS
3. TROUBLE FALLING OR STAYING ASLEEP: NEARLY EVERY DAY
SUM OF ALL RESPONSES TO PHQ QUESTIONS 1-9: 19
5. POOR APPETITE OR OVEREATING: MORE THAN HALF THE DAYS

## 2024-04-16 NOTE — TELEPHONE ENCOUNTER
Detailed message left advising pt this will be addressed at today's appt at 2pm.  Encouraged pt to keep appt today.

## 2024-04-16 NOTE — PROGRESS NOTES
[facility]  Office Progress Note - Dr. Swift  4/16/24    CC:   Chief Complaint   Patient presents with    Tests     Sees Cardio in [place] - Dr. Bentley   They want him to have Stress Test, Sleep study and PFT completed.  Pt does not want to have this testing done in [place]. He would like these things done at [facility].        /86   Pulse 79   Temp 97.7 °F (36.5 °C) (Temporal)   Ht 1.829 m (6')   Wt 108.4 kg (239 lb)   SpO2 95%   BMI 32.41 kg/m²   Wt Readings from Last 3 Encounters:   04/16/24 108.4 kg (239 lb)   12/01/23 112.5 kg (248 lb)   11/28/23 112.5 kg (248 lb)       HPI: \"I have no breath doc\"  Patient is been working with his cardiologist.  He was requested to get a stress test, sleep study, PFTs.  He is also on amiodarone.  It sounds like they are working up causes for shortness of breath.  He complains of chronic shortness of breath which has been present over quite some time.  He reports an albuterol inhaler helped in the past but it does not have any refills on that.  He is a long-term non-smoker, but did formerly smoke.  He stopped back when he was in [place].  Not currently an agent orange exposure .  He did not get the test done that his cardiologist wanted because he says that they wanted him to do them at their institution.  He did not want to do that and preferred to do that at [place].  I do not have much more information there other than that apparently the cardiology office did not get him set up and stable.  Not sure that they were asked to do so.  We will check with him on that.    He is interested in Viagra.  We had a discussion and eventually had determined that he is on isosorbide daily so Viagra is contraindicated.  He is accepting of this.    Reports that he is off of his opiate pain pills because of a problem which sounds like a miscount.  His wife \"got into them\" he says.    Positive PHQ-9 today which is not unusual for him.  [facility].

## 2024-04-16 NOTE — TELEPHONE ENCOUNTER
Please call Dr. Rogel [facility].    Mr. Beltrán is a patient.  At his last office visit they request that he get a stress test, PFTs, and sleep study.  The patient reports that they insisted that he have them done in their health system.  He does not want to do this.  He wants to have them done at [facility].  Please request that the cardiology office schedule him as he requests at [place].  He still complains of chronic shortness of breath for which these tests were ordered to further assess.  This could also be an amiodarone side effect.

## 2024-04-17 NOTE — TELEPHONE ENCOUNTER
Left voicemail for Dr. Rogel's MA to return call to office re: patient wanting testing done locally.

## 2024-04-17 NOTE — TELEPHONE ENCOUNTER
Delia returned call and informed of patient's request to have stress test, PFT, and sleep study done at [facility].  I provided Delia with  [facility] scheduling fax number to fax orders.  Delia stated she would fax orders now.  Patient informed and verbalizes understanding.

## 2024-05-13 ENCOUNTER — OFFICE VISIT (OUTPATIENT)
Dept: FAMILY MEDICINE CLINIC | Age: 74
End: 2024-05-13
Payer: MEDICARE

## 2024-05-13 ENCOUNTER — TELEPHONE (OUTPATIENT)
Dept: FAMILY MEDICINE CLINIC | Age: 74
End: 2024-05-13

## 2024-05-13 VITALS
HEIGHT: 72 IN | HEART RATE: 78 BPM | WEIGHT: 245 LBS | SYSTOLIC BLOOD PRESSURE: 124 MMHG | TEMPERATURE: 97.6 F | BODY MASS INDEX: 33.18 KG/M2 | DIASTOLIC BLOOD PRESSURE: 80 MMHG | OXYGEN SATURATION: 93 %

## 2024-05-13 DIAGNOSIS — D69.6 THROMBOCYTOPENIA, UNSPECIFIED (HCC): ICD-10-CM

## 2024-05-13 DIAGNOSIS — I10 ESSENTIAL HYPERTENSION: ICD-10-CM

## 2024-05-13 DIAGNOSIS — B35.6 TINEA CRURIS: ICD-10-CM

## 2024-05-13 DIAGNOSIS — R73.09 ELEVATED HEMOGLOBIN A1C: ICD-10-CM

## 2024-05-13 DIAGNOSIS — Z89.412 HISTORY OF AMPUTATION OF LEFT GREAT TOE (HCC): ICD-10-CM

## 2024-05-13 DIAGNOSIS — Z00.00 MEDICARE ANNUAL WELLNESS VISIT, SUBSEQUENT: Primary | ICD-10-CM

## 2024-05-13 LAB
ALBUMIN: 3.9 G/DL (ref 3.5–5.2)
ALP BLD-CCNC: 45 U/L (ref 40–129)
ALT SERPL-CCNC: 71 U/L (ref 0–40)
ANION GAP SERPL CALCULATED.3IONS-SCNC: 13 MMOL/L (ref 7–16)
AST SERPL-CCNC: 31 U/L (ref 0–39)
BASOPHILS ABSOLUTE: 0.04 K/UL (ref 0–0.2)
BASOPHILS RELATIVE PERCENT: 1 % (ref 0–2)
BILIRUB SERPL-MCNC: 0.5 MG/DL (ref 0–1.2)
BUN BLDV-MCNC: 31 MG/DL (ref 6–23)
CALCIUM SERPL-MCNC: 8.9 MG/DL (ref 8.6–10.2)
CHLORIDE BLD-SCNC: 109 MMOL/L (ref 98–107)
CHOLESTEROL, TOTAL: 159 MG/DL
CO2: 20 MMOL/L (ref 22–29)
CREAT SERPL-MCNC: 1.6 MG/DL (ref 0.7–1.2)
EOSINOPHILS ABSOLUTE: 0.07 K/UL (ref 0.05–0.5)
EOSINOPHILS RELATIVE PERCENT: 1 % (ref 0–6)
GFR, ESTIMATED: 44 ML/MIN/1.73M2
GLUCOSE BLD-MCNC: 87 MG/DL (ref 74–99)
HCT VFR BLD CALC: 36.3 % (ref 37–54)
HDLC SERPL-MCNC: 51 MG/DL
HEMOGLOBIN: 13.2 G/DL (ref 12.5–16.5)
IMMATURE GRANULOCYTES %: 0 % (ref 0–5)
IMMATURE GRANULOCYTES ABSOLUTE: <0.03 K/UL (ref 0–0.58)
LDL CHOLESTEROL: 79 MG/DL
LYMPHOCYTES ABSOLUTE: 2.13 K/UL (ref 1.5–4)
LYMPHOCYTES RELATIVE PERCENT: 31 % (ref 20–42)
MCH RBC QN AUTO: 30.2 PG (ref 26–35)
MCHC RBC AUTO-ENTMCNC: 36.4 G/DL (ref 32–34.5)
MCV RBC AUTO: 83.1 FL (ref 80–99.9)
MONOCYTES ABSOLUTE: 0.47 K/UL (ref 0.1–0.95)
MONOCYTES RELATIVE PERCENT: 7 % (ref 2–12)
NEUTROPHILS ABSOLUTE: 4.18 K/UL (ref 1.8–7.3)
NEUTROPHILS RELATIVE PERCENT: 61 % (ref 43–80)
PDW BLD-RTO: 15.1 % (ref 11.5–15)
PLATELET # BLD: 148 K/UL (ref 130–450)
PMV BLD AUTO: 11.6 FL (ref 7–12)
POTASSIUM SERPL-SCNC: 4.8 MMOL/L (ref 3.5–5)
RBC # BLD: 4.37 M/UL (ref 3.8–5.8)
SODIUM BLD-SCNC: 142 MMOL/L (ref 132–146)
TOTAL PROTEIN: 6.8 G/DL (ref 6.4–8.3)
TRIGL SERPL-MCNC: 147 MG/DL
VLDLC SERPL CALC-MCNC: 29 MG/DL
WBC # BLD: 6.9 K/UL (ref 4.5–11.5)

## 2024-05-13 PROCEDURE — 3079F DIAST BP 80-89 MM HG: CPT | Performed by: FAMILY MEDICINE

## 2024-05-13 PROCEDURE — 1123F ACP DISCUSS/DSCN MKR DOCD: CPT | Performed by: FAMILY MEDICINE

## 2024-05-13 PROCEDURE — G0439 PPPS, SUBSEQ VISIT: HCPCS | Performed by: FAMILY MEDICINE

## 2024-05-13 PROCEDURE — 3074F SYST BP LT 130 MM HG: CPT | Performed by: FAMILY MEDICINE

## 2024-05-13 RX ORDER — TAMSULOSIN HYDROCHLORIDE 0.4 MG/1
0.4 CAPSULE ORAL NIGHTLY
Qty: 90 CAPSULE | Refills: 1 | Status: SHIPPED | OUTPATIENT
Start: 2024-05-13

## 2024-05-13 RX ORDER — FOLIC ACID 1 MG/1
1 TABLET ORAL DAILY
Qty: 90 TABLET | Refills: 1 | Status: SHIPPED | OUTPATIENT
Start: 2024-05-13

## 2024-05-13 RX ORDER — PREGABALIN 75 MG/1
75 CAPSULE ORAL 3 TIMES DAILY
COMMUNITY
Start: 2024-03-04

## 2024-05-13 ASSESSMENT — PATIENT HEALTH QUESTIONNAIRE - PHQ9
7. TROUBLE CONCENTRATING ON THINGS, SUCH AS READING THE NEWSPAPER OR WATCHING TELEVISION: NOT AT ALL
2. FEELING DOWN, DEPRESSED OR HOPELESS: SEVERAL DAYS
3. TROUBLE FALLING OR STAYING ASLEEP: MORE THAN HALF THE DAYS
9. THOUGHTS THAT YOU WOULD BE BETTER OFF DEAD, OR OF HURTING YOURSELF: SEVERAL DAYS
6. FEELING BAD ABOUT YOURSELF - OR THAT YOU ARE A FAILURE OR HAVE LET YOURSELF OR YOUR FAMILY DOWN: NOT AT ALL
SUM OF ALL RESPONSES TO PHQ9 QUESTIONS 1 & 2: 4
SUM OF ALL RESPONSES TO PHQ QUESTIONS 1-9: 10
4. FEELING TIRED OR HAVING LITTLE ENERGY: MORE THAN HALF THE DAYS
SUM OF ALL RESPONSES TO PHQ QUESTIONS 1-9: 11
SUM OF ALL RESPONSES TO PHQ QUESTIONS 1-9: 11
10. IF YOU CHECKED OFF ANY PROBLEMS, HOW DIFFICULT HAVE THESE PROBLEMS MADE IT FOR YOU TO DO YOUR WORK, TAKE CARE OF THINGS AT HOME, OR GET ALONG WITH OTHER PEOPLE: VERY DIFFICULT
8. MOVING OR SPEAKING SO SLOWLY THAT OTHER PEOPLE COULD HAVE NOTICED. OR THE OPPOSITE, BEING SO FIGETY OR RESTLESS THAT YOU HAVE BEEN MOVING AROUND A LOT MORE THAN USUAL: SEVERAL DAYS
5. POOR APPETITE OR OVEREATING: SEVERAL DAYS
1. LITTLE INTEREST OR PLEASURE IN DOING THINGS: NEARLY EVERY DAY
SUM OF ALL RESPONSES TO PHQ QUESTIONS 1-9: 11

## 2024-05-13 ASSESSMENT — COLUMBIA-SUICIDE SEVERITY RATING SCALE - C-SSRS
2. HAVE YOU ACTUALLY HAD ANY THOUGHTS OF KILLING YOURSELF?: NO
6. HAVE YOU EVER DONE ANYTHING, STARTED TO DO ANYTHING, OR PREPARED TO DO ANYTHING TO END YOUR LIFE?: NO
1. WITHIN THE PAST MONTH, HAVE YOU WISHED YOU WERE DEAD OR WISHED YOU COULD GO TO SLEEP AND NOT WAKE UP?: NO

## 2024-05-13 ASSESSMENT — LIFESTYLE VARIABLES
HOW MANY STANDARD DRINKS CONTAINING ALCOHOL DO YOU HAVE ON A TYPICAL DAY: PATIENT DOES NOT DRINK
HOW OFTEN DO YOU HAVE A DRINK CONTAINING ALCOHOL: NEVER

## 2024-05-13 NOTE — TELEPHONE ENCOUNTER
Call out to [facility] Scheduling.  Need to know if they have received stress test, sleep study, and PFT order on pt.  Dr. Swift did not order these, Dr. Rogel out of [place] is the ordering provider.    Per 04/16 telephone encounter, these were faxed to [facility].  Pt has not been contacted for scheduling and he states he has tried to call [facility] to schedule himself but has never received a call back.    Following up on these tests for pt.    I spoke with Pennie she said she was going to put a note in and have someone call us on this.

## 2024-05-13 NOTE — PROGRESS NOTES
Medicare Annual Wellness Visit    Bret Beltrán is here for Medicare AWV    Chief Complaint   Patient presents with    Medicare AWV       Assessment & Plan   Medicare AWV, likely subsequent  Overall Bret Beltrán is doing well.   Age appropriate HM topics reviewed and updated where agreeable.   Vaccines reviewed and updated where agreeable.   Age appropriate anticipatory guidance discussed.   Encouraged to work on healthy diet and exercise strategies.   Opportunity to ask questions and answers provided as able.     Recommend RTO in 1 year for annual physical.       Tinea cruris  -     ciclopirox (LOPROX) 0.77 % cream; Apply topically 2 times daily., Disp-30 g, R-1, Normal  History of amputation of left great toe (HCC)  Thrombocytopenia, unspecified (HCC)  -     CBC with Auto Differential; Future  -     Comprehensive Metabolic Panel; Future  Essential hypertension  -     Lipid Panel; Future  -     Hemoglobin A1C; Future  Elevated hemoglobin A1c  -     Hemoglobin A1C; Future    Recommendations for Preventive Services Due: see orders and patient instructions/AVS.  Recommended screening schedule for the next 5-10 years is provided to the patient in written form: see Patient Instructions/AVS.     Return for Medicare Annual Wellness Visit in 1 year.     Subjective   The following acute and/or chronic problems were also addressed today:      Groin itch and rash.   Appears tinea cruris on left inner thigh.  Not involving the groin/skin fold.   Sounds like have tried a couple azoles in the past without complete resolution.   Right side did resolve however.     Left great toe amputation.   Doing fine. Yearly document. Second toe has hammertoe and callus but no wound. Sometimes painful.  Has dec sensation. He does follow with Pod.     Hypertension  Follow-up  Blood pressure is controlled today.  BP Readings from Last 3 Encounters:   05/16/24 109/83   05/13/24 124/80   04/16/24 122/86     Hx mild thrombocytopenia.   Lab Results

## 2024-05-14 LAB — HBA1C MFR BLD: 6 % (ref 4–5.6)

## 2024-05-15 ENCOUNTER — APPOINTMENT (OUTPATIENT)
Dept: GENERAL RADIOLOGY | Age: 74
End: 2024-05-15
Payer: MEDICARE

## 2024-05-15 ENCOUNTER — HOSPITAL ENCOUNTER (EMERGENCY)
Age: 74
Discharge: HOME | End: 2024-05-16
Attending: EMERGENCY MEDICINE
Payer: MEDICARE

## 2024-05-15 ENCOUNTER — APPOINTMENT (OUTPATIENT)
Dept: CT IMAGING | Age: 74
End: 2024-05-15
Payer: MEDICARE

## 2024-05-15 DIAGNOSIS — F10.920 ACUTE ALCOHOLIC INTOXICATION WITHOUT COMPLICATION (HCC): Primary | ICD-10-CM

## 2024-05-15 DIAGNOSIS — E86.0 DEHYDRATION: ICD-10-CM

## 2024-05-15 DIAGNOSIS — N17.9 AKI (ACUTE KIDNEY INJURY) (HCC): ICD-10-CM

## 2024-05-15 LAB
ALBUMIN SERPL-MCNC: 3.9 G/DL (ref 3.5–5.2)
ALP SERPL-CCNC: 44 U/L (ref 40–129)
ALT SERPL-CCNC: 77 U/L (ref 0–40)
AMMONIA PLAS-SCNC: 36 UMOL/L (ref 16–60)
ANION GAP SERPL CALCULATED.3IONS-SCNC: 12 MMOL/L (ref 7–16)
APAP SERPL-MCNC: <5 UG/ML (ref 10–30)
AST SERPL-CCNC: 41 U/L (ref 0–39)
B.E.: -7.6 MMOL/L (ref -3–3)
BASOPHILS # BLD: 0.05 K/UL (ref 0–0.2)
BASOPHILS NFR BLD: 1 % (ref 0–2)
BILIRUB SERPL-MCNC: 0.3 MG/DL (ref 0–1.2)
BUN SERPL-MCNC: 36 MG/DL (ref 6–23)
CALCIUM SERPL-MCNC: 8.9 MG/DL (ref 8.6–10.2)
CHLORIDE SERPL-SCNC: 105 MMOL/L (ref 98–107)
CK SERPL-CCNC: 127 U/L (ref 20–200)
CO2 SERPL-SCNC: 19 MMOL/L (ref 22–29)
COHB: 0.4 % (ref 0–1.5)
CREAT SERPL-MCNC: 2 MG/DL (ref 0.7–1.2)
CRITICAL: ABNORMAL
DATE ANALYZED: ABNORMAL
DATE OF COLLECTION: ABNORMAL
EOSINOPHIL # BLD: 0.06 K/UL (ref 0.05–0.5)
EOSINOPHILS RELATIVE PERCENT: 1 % (ref 0–6)
ERYTHROCYTE [DISTWIDTH] IN BLOOD BY AUTOMATED COUNT: 15.3 % (ref 11.5–15)
ETHANOLAMINE SERPL-MCNC: 181 MG/DL (ref 0–0.08)
GFR, ESTIMATED: 34 ML/MIN/1.73M2
GLUCOSE SERPL-MCNC: 98 MG/DL (ref 74–99)
HCO3: 17.6 MMOL/L (ref 22–26)
HCT VFR BLD AUTO: 35.6 % (ref 37–54)
HGB BLD-MCNC: 12.7 G/DL (ref 12.5–16.5)
HHB: 6.9 % (ref 0–5)
IMM GRANULOCYTES # BLD AUTO: 0.03 K/UL (ref 0–0.58)
IMM GRANULOCYTES NFR BLD: 0 % (ref 0–5)
INR PPP: 1.1
LAB: ABNORMAL
LACTATE BLDV-SCNC: 2.6 MMOL/L (ref 0.5–2.2)
LIPASE SERPL-CCNC: 32 U/L (ref 13–60)
LYMPHOCYTES NFR BLD: 2.39 K/UL (ref 1.5–4)
LYMPHOCYTES RELATIVE PERCENT: 34 % (ref 20–42)
Lab: 1849
MCH RBC QN AUTO: 28.9 PG (ref 26–35)
MCHC RBC AUTO-ENTMCNC: 35.7 G/DL (ref 32–34.5)
MCV RBC AUTO: 81.1 FL (ref 80–99.9)
METHB: 0.3 % (ref 0–1.5)
MONOCYTES NFR BLD: 0.65 K/UL (ref 0.1–0.95)
MONOCYTES NFR BLD: 9 % (ref 2–12)
NEUTROPHILS NFR BLD: 55 % (ref 43–80)
NEUTS SEG NFR BLD: 3.93 K/UL (ref 1.8–7.3)
O2 CONTENT: 17.3 ML/DL
O2 SATURATION: 93.1 % (ref 92–98.5)
O2HB: 92.4 % (ref 94–97)
OPERATOR ID: ABNORMAL
PARTIAL THROMBOPLASTIN TIME: 29.7 SEC (ref 24.5–35.1)
PATIENT TEMP: 37 C
PCO2: 35.1 MMHG (ref 35–45)
PH BLOOD GAS: 7.32 (ref 7.35–7.45)
PLATELET # BLD AUTO: 160 K/UL (ref 130–450)
PMV BLD AUTO: 10.4 FL (ref 7–12)
PO2: 80.3 MMHG (ref 75–100)
POTASSIUM SERPL-SCNC: 3.8 MMOL/L (ref 3.5–5)
PROT SERPL-MCNC: 6.9 G/DL (ref 6.4–8.3)
PROTHROMBIN TIME: 11.6 SEC (ref 9.3–12.4)
RBC # BLD AUTO: 4.39 M/UL (ref 3.8–5.8)
SALICYLATES SERPL-MCNC: <0.3 MG/DL (ref 0–30)
SODIUM SERPL-SCNC: 136 MMOL/L (ref 132–146)
SOURCE, BLOOD GAS: ABNORMAL
THB: 13.3 G/DL (ref 11.5–16.5)
TIME ANALYZED: 1900
TOXIC TRICYCLIC SC,BLOOD: NEGATIVE
TROPONIN I SERPL HS-MCNC: 19 NG/L (ref 0–11)
WBC OTHER # BLD: 7.1 K/UL (ref 4.5–11.5)

## 2024-05-15 PROCEDURE — 84484 ASSAY OF TROPONIN QUANT: CPT

## 2024-05-15 PROCEDURE — 80307 DRUG TEST PRSMV CHEM ANLYZR: CPT

## 2024-05-15 PROCEDURE — 85025 COMPLETE CBC W/AUTO DIFF WBC: CPT

## 2024-05-15 PROCEDURE — 80053 COMPREHEN METABOLIC PANEL: CPT

## 2024-05-15 PROCEDURE — 82140 ASSAY OF AMMONIA: CPT

## 2024-05-15 PROCEDURE — 83605 ASSAY OF LACTIC ACID: CPT

## 2024-05-15 PROCEDURE — 2580000003 HC RX 258: Performed by: EMERGENCY MEDICINE

## 2024-05-15 PROCEDURE — 80179 DRUG ASSAY SALICYLATE: CPT

## 2024-05-15 PROCEDURE — 70450 CT HEAD/BRAIN W/O DYE: CPT

## 2024-05-15 PROCEDURE — 99285 EMERGENCY DEPT VISIT HI MDM: CPT

## 2024-05-15 PROCEDURE — 82805 BLOOD GASES W/O2 SATURATION: CPT

## 2024-05-15 PROCEDURE — 83690 ASSAY OF LIPASE: CPT

## 2024-05-15 PROCEDURE — 96361 HYDRATE IV INFUSION ADD-ON: CPT

## 2024-05-15 PROCEDURE — 82550 ASSAY OF CK (CPK): CPT

## 2024-05-15 PROCEDURE — 85610 PROTHROMBIN TIME: CPT

## 2024-05-15 PROCEDURE — 80143 DRUG ASSAY ACETAMINOPHEN: CPT

## 2024-05-15 PROCEDURE — G0480 DRUG TEST DEF 1-7 CLASSES: HCPCS

## 2024-05-15 PROCEDURE — 71045 X-RAY EXAM CHEST 1 VIEW: CPT

## 2024-05-15 PROCEDURE — 85730 THROMBOPLASTIN TIME PARTIAL: CPT

## 2024-05-15 PROCEDURE — 96360 HYDRATION IV INFUSION INIT: CPT

## 2024-05-15 PROCEDURE — 93005 ELECTROCARDIOGRAM TRACING: CPT | Performed by: EMERGENCY MEDICINE

## 2024-05-15 RX ORDER — 0.9 % SODIUM CHLORIDE 0.9 %
1000 INTRAVENOUS SOLUTION INTRAVENOUS ONCE
Status: COMPLETED | OUTPATIENT
Start: 2024-05-15 | End: 2024-05-15

## 2024-05-15 RX ORDER — 0.9 % SODIUM CHLORIDE 0.9 %
1000 INTRAVENOUS SOLUTION INTRAVENOUS ONCE
Status: COMPLETED | OUTPATIENT
Start: 2024-05-15 | End: 2024-05-16

## 2024-05-15 RX ADMIN — SODIUM CHLORIDE 1000 ML: 9 INJECTION, SOLUTION INTRAVENOUS at 22:27

## 2024-05-15 RX ADMIN — SODIUM CHLORIDE 1000 ML: 9 INJECTION, SOLUTION INTRAVENOUS at 19:10

## 2024-05-15 RX ADMIN — SODIUM CHLORIDE 1000 ML: 9 INJECTION, SOLUTION INTRAVENOUS at 20:55

## 2024-05-15 ASSESSMENT — PAIN - FUNCTIONAL ASSESSMENT: PAIN_FUNCTIONAL_ASSESSMENT: NONE - DENIES PAIN

## 2024-05-16 VITALS
RESPIRATION RATE: 16 BRPM | TEMPERATURE: 97.7 F | SYSTOLIC BLOOD PRESSURE: 109 MMHG | DIASTOLIC BLOOD PRESSURE: 83 MMHG | OXYGEN SATURATION: 93 % | HEART RATE: 89 BPM

## 2024-05-16 LAB
EKG ATRIAL RATE: 81 BPM
EKG P-R INTERVAL: 172 MS
EKG Q-T INTERVAL: 502 MS
EKG QRS DURATION: 176 MS
EKG QTC CALCULATION (BAZETT): 578 MS
EKG R AXIS: 151 DEGREES
EKG T AXIS: 148 DEGREES
EKG VENTRICULAR RATE: 80 BPM

## 2024-05-16 PROCEDURE — 93010 ELECTROCARDIOGRAM REPORT: CPT | Performed by: INTERNAL MEDICINE

## 2024-05-16 NOTE — ED PROVIDER NOTES
[facility] EMERGENCY DEPARTMENT  EMERGENCY DEPARTMENT ENCOUNTER        Pt Name: Bret Beltrán  MRN: [id number]  Birthdate 1950  Date of evaluation: 5/15/2024  Provider: Madelyn Mcneil DO  PCP: Mariano Swift MD  Note Started: 11:35 PM EDT 5/15/24    CHIEF COMPLAINT       Chief Complaint   Patient presents with    Altered Mental Status     ETOH    Hypotension       HISTORY OF PRESENT ILLNESS: 1 or more Elements   History From: patient, wife and EMS    Limitations to history : Intoxication    Bret Beltrán is a 74 y.o. male who presents with altered mental status beginning today.  He apparently was drinking with some friends, had some alcohol and several shots and started to become unresponsive.  EMS was called.  They report he is hypotensive and unable to obtain IV access.  He arrives awake, intoxicated and slurring but protecting his airway and answering questions appropriately.  The complaint has been persistent, moderate in severity, and worsened by nothing.  He states he does not drink every day.  Patient denies fever/chills, sore throat, cough, congestion, chest pain, shortness of breath, edema, headache, visual disturbances, focal paresthesias, focal weakness, abdominal pain, nausea, vomiting, diarrhea, constipation, dysuria, hematuria, trauma, neck or back pain, rash or other complaints.        Nursing Notes were all reviewed and agreed with or any disagreements were addressed in the HPI.    REVIEW OF SYSTEMS :           Positives and Pertinent negatives as per HPI.     SURGICAL HISTORY     Past Surgical History:   Procedure Laterality Date    BACK SURGERY      CARDIAC CATHETERIZATION  09/15/2020    Dr. Leyva    CARDIAC DEFIBRILLATOR PLACEMENT  9/223/20    UPGRADE PPM TO DUAL ICD    ([facility])   DR. NEFF     CARDIAC PACEMAKER PLACEMENT  2014    CHOLECYSTECTOMY  1999    EYE SURGERY Left     FOOT SURGERY      KNEE SURGERY Left 2020    Dr. Nirali bArams

## 2024-05-17 NOTE — TELEPHONE ENCOUNTER
Spoke with Becky @ [facility], scheduling.  They have received orders for PFT, Stress Test and Sleep Study.  They tried to reach pt but were unable to reach him.  They are going to attempt to reach him again to get these scheduled.  I will call pt to advise him of this and ask that he keeps an eye on him phone, answering any unknown numbers, as it may me [facility] scheduling.     I spoke with advised me that sleep study orders need to go directly to the sleep lab. Sleep lab schedules their own.  Becky also advised me that a form needs filled out about the sleep study.  She will fax the form and order to us and will help get that going as well.  Will await form.

## 2024-05-22 ENCOUNTER — TELEPHONE (OUTPATIENT)
Dept: FAMILY MEDICINE CLINIC | Age: 74
End: 2024-05-22

## 2024-05-22 DIAGNOSIS — G47.33 OSA (OBSTRUCTIVE SLEEP APNEA): Primary | ICD-10-CM

## 2024-05-22 NOTE — TELEPHONE ENCOUNTER
[place] and neck circumference are not needed at this time. Document scanned into patient's chart that was completed on 4/16/24.     There is another open message in patient's chart about the sleep study. Natalie is waiting for a form that [facility] requires. Have you by chance seen the form Dr Swift?

## 2024-05-22 NOTE — TELEPHONE ENCOUNTER
Pt called in asking if you could send in a referral for a sleep study at [facility].

## 2024-07-22 ENCOUNTER — OFFICE VISIT (OUTPATIENT)
Dept: FAMILY MEDICINE CLINIC | Age: 74
End: 2024-07-22
Payer: MEDICARE

## 2024-07-22 VITALS
WEIGHT: 245 LBS | SYSTOLIC BLOOD PRESSURE: 128 MMHG | DIASTOLIC BLOOD PRESSURE: 84 MMHG | HEIGHT: 72 IN | OXYGEN SATURATION: 96 % | BODY MASS INDEX: 33.18 KG/M2 | HEART RATE: 80 BPM | TEMPERATURE: 97.5 F

## 2024-07-22 DIAGNOSIS — G89.29 CHRONIC PAIN OF RIGHT KNEE: ICD-10-CM

## 2024-07-22 DIAGNOSIS — R21 RASH: Primary | ICD-10-CM

## 2024-07-22 DIAGNOSIS — M25.561 CHRONIC PAIN OF RIGHT KNEE: ICD-10-CM

## 2024-07-22 PROCEDURE — 1123F ACP DISCUSS/DSCN MKR DOCD: CPT | Performed by: FAMILY MEDICINE

## 2024-07-22 PROCEDURE — 3074F SYST BP LT 130 MM HG: CPT | Performed by: FAMILY MEDICINE

## 2024-07-22 PROCEDURE — 3079F DIAST BP 80-89 MM HG: CPT | Performed by: FAMILY MEDICINE

## 2024-07-22 PROCEDURE — 99213 OFFICE O/P EST LOW 20 MIN: CPT | Performed by: FAMILY MEDICINE

## 2024-07-22 RX ORDER — TRIAMCINOLONE ACETONIDE 1 MG/G
OINTMENT TOPICAL 2 TIMES DAILY
Qty: 30 G | Refills: 0 | Status: SHIPPED | OUTPATIENT
Start: 2024-07-22 | End: 2024-07-29

## 2024-07-22 RX ORDER — OXYBUTYNIN CHLORIDE 10 MG/1
10 TABLET, EXTENDED RELEASE ORAL DAILY
COMMUNITY
Start: 2024-07-01

## 2024-07-22 NOTE — PROGRESS NOTES
[facility]  Office Progress Note - Dr. Swift  7/22/24    CC:   Chief Complaint   Patient presents with    Knee Pain     BL knee pain. Worse on left.    Rash     Intermittent rash on left inner thigh.        /84   Pulse 80   Temp 97.5 °F (36.4 °C) (Temporal)   Ht 1.829 m (6')   Wt 111.1 kg (245 lb)   SpO2 96%   BMI 33.23 kg/m²   Wt Readings from Last 3 Encounters:   07/22/24 111.1 kg (245 lb)   05/13/24 111.1 kg (245 lb)   04/16/24 108.4 kg (239 lb)       HPI: rash on left forearm, looks a bit vesicular  But its been a month.   Has been apply athletes foot spray to the rash.   Denies tingling, numbness, burning.     Still has the groin rash on the left side but finds when uses athletes foot spray and wearing depends, seems to resolve the right.     Reports [facility].  He has seen a couple orthopedic surgeons who will not work on the left knee given that it has been replaced.  He does not wish to follow-up with his original surgeon on this.  Sounds like [facility].  He is asking if he could have an injection in his right knee.  He reports he had injections in bilateral knees in the past and he got great relief.  He claims that the left knee was injected post replacement, but it is unclear if that understanding is accurate.  In any case his right knee does not have any artificial hardware and relatively recent x-ray showed moderate arthritis.  He is reasonable candidate for another injection though he was counseled that it may not provide as good of relief as his original.      _________________________________________________________    Assessment / Plan  Bret was seen today for knee pain and rash.    Diagnoses and all orders for this visit:    Rash  -     triamcinolone (KENALOG) 0.1 % ointment; Apply topically 2 times daily for 7 days To the arm rash  Possibly the tail end of shingles.  Vesicular looking rash on

## 2024-08-08 ENCOUNTER — TELEPHONE (OUTPATIENT)
Dept: FAMILY MEDICINE CLINIC | Age: 74
End: 2024-08-08

## 2024-08-08 DIAGNOSIS — G47.33 OSA (OBSTRUCTIVE SLEEP APNEA): Primary | ICD-10-CM

## 2024-08-08 NOTE — TELEPHONE ENCOUNTER
Patient called into office and stated that he had his sleep study test done at [facility] on Tuesday 8/6/2024. He states that he failed it and that they suggested that he needs a CPAP machine as soon as possible. Patient was inquiring on an order and if it should be sent to [facility] since he lives in [place].

## 2024-08-08 NOTE — TELEPHONE ENCOUNTER
Sleep study was faxed over to office- I printed and scanned them into patients chart and routed to PCP to review

## 2024-09-11 ENCOUNTER — OFFICE VISIT (OUTPATIENT)
Dept: FAMILY MEDICINE CLINIC | Age: 74
End: 2024-09-11

## 2024-09-11 VITALS
BODY MASS INDEX: 33.82 KG/M2 | WEIGHT: 241.6 LBS | HEIGHT: 71 IN | TEMPERATURE: 97.6 F | SYSTOLIC BLOOD PRESSURE: 108 MMHG | DIASTOLIC BLOOD PRESSURE: 70 MMHG | HEART RATE: 80 BPM | OXYGEN SATURATION: 95 %

## 2024-09-11 DIAGNOSIS — G89.29 CHRONIC PAIN OF BOTH KNEES: ICD-10-CM

## 2024-09-11 DIAGNOSIS — M25.562 CHRONIC PAIN OF BOTH KNEES: ICD-10-CM

## 2024-09-11 DIAGNOSIS — M25.561 RIGHT KNEE PAIN, UNSPECIFIED CHRONICITY: Primary | ICD-10-CM

## 2024-09-11 DIAGNOSIS — M25.561 CHRONIC PAIN OF BOTH KNEES: ICD-10-CM

## 2024-09-11 RX ORDER — PREGABALIN 100 MG/1
CAPSULE ORAL
COMMUNITY
Start: 2024-08-15

## 2024-09-11 RX ORDER — METHYLPREDNISOLONE ACETATE 40 MG/ML
40 INJECTION, SUSPENSION INTRA-ARTICULAR; INTRALESIONAL; INTRAMUSCULAR; SOFT TISSUE ONCE
Status: COMPLETED | OUTPATIENT
Start: 2024-09-11 | End: 2024-09-11

## 2024-09-11 RX ORDER — LIDOCAINE HYDROCHLORIDE 10 MG/ML
2 INJECTION, SOLUTION INFILTRATION; PERINEURAL ONCE
Status: COMPLETED | OUTPATIENT
Start: 2024-09-11 | End: 2024-09-11

## 2024-09-11 RX ORDER — SILDENAFIL 100 MG/1
TABLET, FILM COATED ORAL
COMMUNITY
Start: 2024-08-06

## 2024-09-11 RX ADMIN — METHYLPREDNISOLONE ACETATE 40 MG: 40 INJECTION, SUSPENSION INTRA-ARTICULAR; INTRALESIONAL; INTRAMUSCULAR; SOFT TISSUE at 16:55

## 2024-09-11 RX ADMIN — LIDOCAINE HYDROCHLORIDE 2 ML: 10 INJECTION, SOLUTION INFILTRATION; PERINEURAL at 16:56

## 2024-09-11 SDOH — ECONOMIC STABILITY: INCOME INSECURITY: HOW HARD IS IT FOR YOU TO PAY FOR THE VERY BASICS LIKE FOOD, HOUSING, MEDICAL CARE, AND HEATING?: SOMEWHAT HARD

## 2024-09-11 SDOH — ECONOMIC STABILITY: FOOD INSECURITY: WITHIN THE PAST 12 MONTHS, YOU WORRIED THAT YOUR FOOD WOULD RUN OUT BEFORE YOU GOT MONEY TO BUY MORE.: NEVER TRUE

## 2024-09-11 SDOH — ECONOMIC STABILITY: FOOD INSECURITY: WITHIN THE PAST 12 MONTHS, THE FOOD YOU BOUGHT JUST DIDN'T LAST AND YOU DIDN'T HAVE MONEY TO GET MORE.: NEVER TRUE

## 2024-09-11 ASSESSMENT — ENCOUNTER SYMPTOMS
SHORTNESS OF BREATH: 0
NAUSEA: 0
VOMITING: 0
COUGH: 0
RHINORRHEA: 0

## 2024-09-24 ENCOUNTER — OFFICE VISIT (OUTPATIENT)
Dept: FAMILY MEDICINE CLINIC | Age: 74
End: 2024-09-24
Payer: MEDICARE

## 2024-09-24 VITALS
HEART RATE: 80 BPM | SYSTOLIC BLOOD PRESSURE: 110 MMHG | HEIGHT: 71 IN | DIASTOLIC BLOOD PRESSURE: 64 MMHG | TEMPERATURE: 97.8 F | OXYGEN SATURATION: 95 % | BODY MASS INDEX: 32.9 KG/M2 | WEIGHT: 235 LBS

## 2024-09-24 DIAGNOSIS — M54.16 LUMBAR RADICULOPATHY: Primary | ICD-10-CM

## 2024-09-24 PROCEDURE — 3078F DIAST BP <80 MM HG: CPT | Performed by: FAMILY MEDICINE

## 2024-09-24 PROCEDURE — 1123F ACP DISCUSS/DSCN MKR DOCD: CPT | Performed by: FAMILY MEDICINE

## 2024-09-24 PROCEDURE — 3074F SYST BP LT 130 MM HG: CPT | Performed by: FAMILY MEDICINE

## 2024-09-24 PROCEDURE — 99213 OFFICE O/P EST LOW 20 MIN: CPT | Performed by: FAMILY MEDICINE

## 2024-10-25 ENCOUNTER — OFFICE VISIT (OUTPATIENT)
Dept: ORTHOPEDIC SURGERY | Age: 74
End: 2024-10-25

## 2024-10-25 VITALS — BODY MASS INDEX: 33.6 KG/M2 | WEIGHT: 240 LBS | HEIGHT: 71 IN

## 2024-10-25 DIAGNOSIS — M25.521 RIGHT ELBOW PAIN: ICD-10-CM

## 2024-10-25 DIAGNOSIS — M19.011 LOCALIZED OSTEOARTHRITIS OF RIGHT SHOULDER: Primary | ICD-10-CM

## 2024-10-25 DIAGNOSIS — S56.911A ELBOW STRAIN, RIGHT, INITIAL ENCOUNTER: ICD-10-CM

## 2024-10-25 DIAGNOSIS — M25.511 RIGHT SHOULDER PAIN, UNSPECIFIED CHRONICITY: ICD-10-CM

## 2024-10-25 RX ORDER — LIDOCAINE HYDROCHLORIDE 10 MG/ML
4 INJECTION, SOLUTION INFILTRATION; PERINEURAL ONCE
Status: COMPLETED | OUTPATIENT
Start: 2024-10-25 | End: 2024-10-25

## 2024-10-25 RX ORDER — TRIAMCINOLONE ACETONIDE 40 MG/ML
40 INJECTION, SUSPENSION INTRA-ARTICULAR; INTRAMUSCULAR ONCE
Status: COMPLETED | OUTPATIENT
Start: 2024-10-25 | End: 2024-10-25

## 2024-10-25 RX ADMIN — TRIAMCINOLONE ACETONIDE 40 MG: 40 INJECTION, SUSPENSION INTRA-ARTICULAR; INTRAMUSCULAR at 13:59

## 2024-10-25 RX ADMIN — LIDOCAINE HYDROCHLORIDE 4 ML: 10 INJECTION, SOLUTION INFILTRATION; PERINEURAL at 13:59

## 2024-10-25 NOTE — PROGRESS NOTES
Aliza    VENTRICULOPERITONEAL SHUNT  1995     Current Medications:   No current facility-administered medications for this visit.  Allergies:  Bee venom, Naproxen, and Meloxicam    Social History:   TOBACCO:   reports that he quit smoking about 55 years ago. His smoking use included cigarettes. He started smoking about 56 years ago. He has a 1 pack-year smoking history. He has never used smokeless tobacco.  ETOH:   reports that he does not currently use alcohol.  DRUGS:   reports no history of drug use.    Review of Systems   Constitutional: Negative for fever, chills, diaphoresis, appetite change and fatigue.   HENT: Negative for dental issues, hearing loss and tinnitus. Negative for congestion, sinus pressure, sneezing, sore throat. Negative for headache.  Eyes: Negative for visual disturbance, blurred and double vision. Negative for pain, discharge, redness and itching  Respiratory: Negative for cough, shortness of breath and wheezing.   Cardiovascular: Negative for chest pain, palpitations and leg swelling. No dyspnea on exertion   Gastrointestinal:   Negative for nausea, vomiting, abdominal pain, diarrhea, constipation  or black or bloody.  Hematologic\Lymphatic:  negative for bleeding, petechiae,   Genitourinary: Negative for hematuria and difficulty urinating.   Musculoskeletal: Negative for neck pain and stiffness. Negative for back pain, joint swelling and gait problem. + Right shoulder pain, right ankle pain  Skin: Negative for pallor, rash and wound.   Neurological: Negative for dizziness, tremors, seizures, weakness, light-headedness, no TIA or stroke symptoms. No numbness and headaches.   Psychiatric/Behavioral: Negative.     Physical Examination:   General appearance: alert, well appearing, and in no distress, weight appears overweight  Mental status: alert, oriented to person, place, and time, normal mood, behavior, speech, dress, motor activity, and thought processes  Resp:   resp easy and unlabored,

## 2024-12-13 ENCOUNTER — OFFICE VISIT (OUTPATIENT)
Dept: ORTHOPEDIC SURGERY | Age: 74
End: 2024-12-13

## 2024-12-13 VITALS — BODY MASS INDEX: 33.6 KG/M2 | WEIGHT: 240 LBS | HEIGHT: 71 IN

## 2024-12-13 DIAGNOSIS — M25.562 LEFT KNEE PAIN, UNSPECIFIED CHRONICITY: ICD-10-CM

## 2024-12-13 DIAGNOSIS — M17.11 PRIMARY OSTEOARTHRITIS OF RIGHT KNEE: Primary | ICD-10-CM

## 2024-12-13 RX ORDER — BUPIVACAINE HYDROCHLORIDE 2.5 MG/ML
1 INJECTION, SOLUTION INFILTRATION; PERINEURAL ONCE
Status: COMPLETED | OUTPATIENT
Start: 2024-12-13 | End: 2024-12-13

## 2024-12-13 RX ORDER — TRIAMCINOLONE ACETONIDE 40 MG/ML
40 INJECTION, SUSPENSION INTRA-ARTICULAR; INTRAMUSCULAR ONCE
Status: COMPLETED | OUTPATIENT
Start: 2024-12-13 | End: 2024-12-13

## 2024-12-13 RX ORDER — LIDOCAINE HYDROCHLORIDE 10 MG/ML
1 INJECTION, SOLUTION INFILTRATION; PERINEURAL ONCE
Status: COMPLETED | OUTPATIENT
Start: 2024-12-13 | End: 2024-12-13

## 2024-12-13 RX ADMIN — BUPIVACAINE HYDROCHLORIDE 2.5 MG: 2.5 INJECTION, SOLUTION INFILTRATION; PERINEURAL at 10:20

## 2024-12-13 RX ADMIN — LIDOCAINE HYDROCHLORIDE 1 ML: 10 INJECTION, SOLUTION INFILTRATION; PERINEURAL at 10:21

## 2024-12-13 RX ADMIN — TRIAMCINOLONE ACETONIDE 40 MG: 40 INJECTION, SUSPENSION INTRA-ARTICULAR; INTRAMUSCULAR at 10:22

## 2024-12-13 NOTE — PROGRESS NOTES
[facility]  ORTHOPAEDICS    Follow Up Visit     CHIEF COMPLAINT:   Chief Complaint   Patient presents with    New Patient     Patient is here for luzma knee pain. Patient had L TKA 2021 at [facility] by .         Bret Beltrán returns today for follow up visit regarding his right knee osteoarthritis.  He previously had a left total knee done in 2001 at [facility].  He is continuing care under their discretion.  He had a decent response to his previous steroid injection and would like to repeat this today for the right knee    Physical Exam:     Height: 1.803 m (5' 11\"), Weight - Scale: 108.9 kg (240 lb)    General: Alert and oriented x3, no acute distress  Cardiovascular/pulmonary: No labored breathing, peripheral perfusion intact  Musculoskeletal:    Physical Exam  Upon examination of the right lower extremity, specifically the knee,     the skin is intact. There is mild effusion. The compartments are soft and compressible. Neurovascular integrity is maintained. The range of motion is stable with varus, valgus throughout and arc. Passive range of motion is 3 to 125 degrees. Drawer testing is negative. Lachman's test is negative. Steve's test is negative. Significant tenderness is noted upon palpation over the medial joint line, along with positive patellar crepitance and pain during grind testing.      Controlled Substances Monitoring:      Imaging:    Results  Imaging  X-rays of the right lower extremity and knee, 3 views, demonstrate varus alignment with right knee osteoarthritis, KL grade 4, bone on bone osteoarthritis in the medial compartment as well as patellofemoral. There is also evidence of previous left total knee arthroplasty good position and alignment.              Assesment/Plan:     Assessment & Plan  1. Right knee osteoarthritis.  The patient's right knee exhibits no instability upon mechanical evaluation. His left knee demonstrates a range of motion extending to 130 degrees, with stability

## 2024-12-16 ENCOUNTER — TELEPHONE (OUTPATIENT)
Dept: ORTHOPEDIC SURGERY | Age: 74
End: 2024-12-16

## 2024-12-16 NOTE — TELEPHONE ENCOUNTER
I called into [facility] ([phone]) talked to Brian for an auth on Euflexxa right knee injection.   I was told it was sent for further review  Case ref# [id number]  Office notes with xrays notes faxed to [facility] at [phone]

## 2024-12-28 ENCOUNTER — APPOINTMENT (OUTPATIENT)
Dept: CT IMAGING | Age: 74
DRG: 194 | End: 2024-12-28
Payer: MEDICARE

## 2024-12-28 ENCOUNTER — HOSPITAL ENCOUNTER (INPATIENT)
Age: 74
LOS: 4 days | Discharge: HOME HEALTH | DRG: 194 | End: 2025-01-01
Attending: EMERGENCY MEDICINE | Admitting: FAMILY MEDICINE
Payer: MEDICARE

## 2024-12-28 ENCOUNTER — APPOINTMENT (OUTPATIENT)
Dept: GENERAL RADIOLOGY | Age: 74
DRG: 194 | End: 2024-12-28
Payer: MEDICARE

## 2024-12-28 DIAGNOSIS — N17.9 AKI (ACUTE KIDNEY INJURY) (HCC): Primary | ICD-10-CM

## 2024-12-28 DIAGNOSIS — R53.83 OTHER FATIGUE: ICD-10-CM

## 2024-12-28 DIAGNOSIS — J18.9 PNEUMONIA OF LEFT LOWER LOBE DUE TO INFECTIOUS ORGANISM: ICD-10-CM

## 2024-12-28 LAB
ALBUMIN SERPL-MCNC: 3.7 G/DL (ref 3.5–5.2)
ALP SERPL-CCNC: 56 U/L (ref 40–129)
ALT SERPL-CCNC: 52 U/L (ref 0–40)
ANION GAP SERPL CALCULATED.3IONS-SCNC: 12 MMOL/L (ref 7–16)
AST SERPL-CCNC: 23 U/L (ref 0–39)
B.E.: -1.1 MMOL/L (ref -3–3)
BASOPHILS # BLD: 0.01 K/UL (ref 0–0.2)
BASOPHILS NFR BLD: 0 % (ref 0–2)
BILIRUB SERPL-MCNC: 0.8 MG/DL (ref 0–1.2)
BILIRUB UR QL STRIP: NEGATIVE
BNP SERPL-MCNC: 274 PG/ML (ref 0–450)
BUN SERPL-MCNC: 70 MG/DL (ref 6–23)
CALCIUM SERPL-MCNC: 9.1 MG/DL (ref 8.6–10.2)
CHLORIDE SERPL-SCNC: 96 MMOL/L (ref 98–107)
CLARITY UR: CLEAR
CO2 SERPL-SCNC: 27 MMOL/L (ref 22–29)
COHB: 1.1 % (ref 0–1.5)
COLOR UR: YELLOW
CREAT SERPL-MCNC: 3.6 MG/DL (ref 0.7–1.2)
CREAT UR-MCNC: 234.1 MG/DL (ref 40–278)
CRITICAL: ABNORMAL
DATE ANALYZED: ABNORMAL
DATE OF COLLECTION: ABNORMAL
EOSINOPHIL # BLD: 0.01 K/UL (ref 0.05–0.5)
EOSINOPHILS RELATIVE PERCENT: 0 % (ref 0–6)
ERYTHROCYTE [DISTWIDTH] IN BLOOD BY AUTOMATED COUNT: 15 % (ref 11.5–15)
GFR, ESTIMATED: 17 ML/MIN/1.73M2
GLUCOSE SERPL-MCNC: 138 MG/DL (ref 74–99)
GLUCOSE UR STRIP-MCNC: NEGATIVE MG/DL
HCO3: 23.3 MMOL/L (ref 22–26)
HCT VFR BLD AUTO: 41 % (ref 37–54)
HGB BLD-MCNC: 15.2 G/DL (ref 12.5–16.5)
HGB UR QL STRIP.AUTO: NEGATIVE
HHB: 8.7 % (ref 0–5)
IMM GRANULOCYTES # BLD AUTO: <0.03 K/UL (ref 0–0.58)
IMM GRANULOCYTES NFR BLD: 0 % (ref 0–5)
INFLUENZA A BY PCR: NOT DETECTED
INFLUENZA B BY PCR: NOT DETECTED
KETONES UR STRIP-MCNC: ABNORMAL MG/DL
LAB: ABNORMAL
LACTATE BLDV-SCNC: 1.2 MMOL/L (ref 0.5–2.2)
LEUKOCYTE ESTERASE UR QL STRIP: NEGATIVE
LIPASE SERPL-CCNC: 16 U/L (ref 13–60)
LYMPHOCYTES NFR BLD: 0.59 K/UL (ref 1.5–4)
LYMPHOCYTES RELATIVE PERCENT: 9 % (ref 20–42)
Lab: 1825
MAGNESIUM SERPL-MCNC: 1.9 MG/DL (ref 1.6–2.6)
MCH RBC QN AUTO: 29.7 PG (ref 26–35)
MCHC RBC AUTO-ENTMCNC: 37.1 G/DL (ref 32–34.5)
MCV RBC AUTO: 80.1 FL (ref 80–99.9)
METHB: 0.3 % (ref 0–1.5)
MODE: ABNORMAL
MONOCYTES NFR BLD: 0.35 K/UL (ref 0.1–0.95)
MONOCYTES NFR BLD: 5 % (ref 2–12)
NEUTROPHILS NFR BLD: 86 % (ref 43–80)
NEUTS SEG NFR BLD: 5.99 K/UL (ref 1.8–7.3)
NITRITE UR QL STRIP: NEGATIVE
O2 CONTENT: 18.8 ML/DL
O2 SATURATION: 91.2 % (ref 92–98.5)
O2HB: 89.9 % (ref 94–97)
OPERATOR ID: 166
PATIENT TEMP: 37 C
PCO2: 38 MMHG (ref 35–45)
PH BLOOD GAS: 7.41 (ref 7.35–7.45)
PH UR STRIP: 5.5 [PH] (ref 5–9)
PLATELET # BLD AUTO: 152 K/UL (ref 130–450)
PMV BLD AUTO: 10.7 FL (ref 7–12)
PO2: 64.9 MMHG (ref 75–100)
POTASSIUM SERPL-SCNC: 3.9 MMOL/L (ref 3.5–5)
PROT SERPL-MCNC: 7.2 G/DL (ref 6.4–8.3)
PROT UR STRIP-MCNC: NEGATIVE MG/DL
RBC # BLD AUTO: 5.12 M/UL (ref 3.8–5.8)
RBC #/AREA URNS HPF: ABNORMAL /HPF
SARS-COV-2 RDRP RESP QL NAA+PROBE: NOT DETECTED
SODIUM SERPL-SCNC: 135 MMOL/L (ref 132–146)
SODIUM UR-SCNC: 29 MMOL/L
SOURCE, BLOOD GAS: ABNORMAL
SP GR UR STRIP: >1.03 (ref 1–1.03)
SPECIMEN DESCRIPTION: NORMAL
THB: 14.9 G/DL (ref 11.5–16.5)
TIME ANALYZED: 1834
TROPONIN I SERPL HS-MCNC: 25 NG/L (ref 0–11)
TROPONIN I SERPL HS-MCNC: 27 NG/L (ref 0–11)
UROBILINOGEN UR STRIP-ACNC: 0.2 EU/DL (ref 0–1)
WBC #/AREA URNS HPF: ABNORMAL /HPF
WBC OTHER # BLD: 7 K/UL (ref 4.5–11.5)

## 2024-12-28 PROCEDURE — 87635 SARS-COV-2 COVID-19 AMP PRB: CPT

## 2024-12-28 PROCEDURE — 2580000003 HC RX 258: Performed by: STUDENT IN AN ORGANIZED HEALTH CARE EDUCATION/TRAINING PROGRAM

## 2024-12-28 PROCEDURE — 83690 ASSAY OF LIPASE: CPT

## 2024-12-28 PROCEDURE — 2060000000 HC ICU INTERMEDIATE R&B

## 2024-12-28 PROCEDURE — 93005 ELECTROCARDIOGRAM TRACING: CPT

## 2024-12-28 PROCEDURE — 0202U NFCT DS 22 TRGT SARS-COV-2: CPT

## 2024-12-28 PROCEDURE — 6360000002 HC RX W HCPCS: Performed by: STUDENT IN AN ORGANIZED HEALTH CARE EDUCATION/TRAINING PROGRAM

## 2024-12-28 PROCEDURE — 85025 COMPLETE CBC W/AUTO DIFF WBC: CPT

## 2024-12-28 PROCEDURE — 83735 ASSAY OF MAGNESIUM: CPT

## 2024-12-28 PROCEDURE — 87040 BLOOD CULTURE FOR BACTERIA: CPT

## 2024-12-28 PROCEDURE — 82570 ASSAY OF URINE CREATININE: CPT

## 2024-12-28 PROCEDURE — 84300 ASSAY OF URINE SODIUM: CPT

## 2024-12-28 PROCEDURE — 80053 COMPREHEN METABOLIC PANEL: CPT

## 2024-12-28 PROCEDURE — 6370000000 HC RX 637 (ALT 250 FOR IP): Performed by: STUDENT IN AN ORGANIZED HEALTH CARE EDUCATION/TRAINING PROGRAM

## 2024-12-28 PROCEDURE — 83605 ASSAY OF LACTIC ACID: CPT

## 2024-12-28 PROCEDURE — 71045 X-RAY EXAM CHEST 1 VIEW: CPT

## 2024-12-28 PROCEDURE — 82805 BLOOD GASES W/O2 SATURATION: CPT

## 2024-12-28 PROCEDURE — 2500000003 HC RX 250 WO HCPCS: Performed by: EMERGENCY MEDICINE

## 2024-12-28 PROCEDURE — 71250 CT THORAX DX C-: CPT

## 2024-12-28 PROCEDURE — 2580000003 HC RX 258

## 2024-12-28 PROCEDURE — 99285 EMERGENCY DEPT VISIT HI MDM: CPT

## 2024-12-28 PROCEDURE — 74176 CT ABD & PELVIS W/O CONTRAST: CPT

## 2024-12-28 PROCEDURE — 87502 INFLUENZA DNA AMP PROBE: CPT

## 2024-12-28 PROCEDURE — 6360000002 HC RX W HCPCS: Performed by: EMERGENCY MEDICINE

## 2024-12-28 PROCEDURE — 83880 ASSAY OF NATRIURETIC PEPTIDE: CPT

## 2024-12-28 PROCEDURE — 84484 ASSAY OF TROPONIN QUANT: CPT

## 2024-12-28 PROCEDURE — 81001 URINALYSIS AUTO W/SCOPE: CPT

## 2024-12-28 PROCEDURE — 87086 URINE CULTURE/COLONY COUNT: CPT

## 2024-12-28 PROCEDURE — 2580000003 HC RX 258: Performed by: EMERGENCY MEDICINE

## 2024-12-28 PROCEDURE — 70450 CT HEAD/BRAIN W/O DYE: CPT

## 2024-12-28 PROCEDURE — 94640 AIRWAY INHALATION TREATMENT: CPT

## 2024-12-28 PROCEDURE — 2700000000 HC OXYGEN THERAPY PER DAY

## 2024-12-28 RX ORDER — BUSPIRONE HYDROCHLORIDE 10 MG/1
10 TABLET ORAL 3 TIMES DAILY
Status: DISCONTINUED | OUTPATIENT
Start: 2024-12-28 | End: 2025-01-01 | Stop reason: HOSPADM

## 2024-12-28 RX ORDER — OXYBUTYNIN CHLORIDE 10 MG/1
10 TABLET, EXTENDED RELEASE ORAL DAILY
Status: DISCONTINUED | OUTPATIENT
Start: 2024-12-28 | End: 2025-01-01 | Stop reason: HOSPADM

## 2024-12-28 RX ORDER — POLYETHYLENE GLYCOL 3350 17 G/17G
17 POWDER, FOR SOLUTION ORAL DAILY PRN
Status: DISCONTINUED | OUTPATIENT
Start: 2024-12-28 | End: 2025-01-01 | Stop reason: HOSPADM

## 2024-12-28 RX ORDER — PREGABALIN 50 MG/1
100 CAPSULE ORAL 3 TIMES DAILY PRN
Status: DISCONTINUED | OUTPATIENT
Start: 2024-12-28 | End: 2025-01-01 | Stop reason: HOSPADM

## 2024-12-28 RX ORDER — FOLIC ACID 1 MG/1
1 TABLET ORAL DAILY
Status: DISCONTINUED | OUTPATIENT
Start: 2024-12-29 | End: 2025-01-01 | Stop reason: HOSPADM

## 2024-12-28 RX ORDER — PROCHLORPERAZINE EDISYLATE 5 MG/ML
10 INJECTION INTRAMUSCULAR; INTRAVENOUS EVERY 6 HOURS PRN
Status: DISCONTINUED | OUTPATIENT
Start: 2024-12-28 | End: 2025-01-01 | Stop reason: HOSPADM

## 2024-12-28 RX ORDER — SODIUM CHLORIDE 0.9 % (FLUSH) 0.9 %
5-40 SYRINGE (ML) INJECTION EVERY 12 HOURS SCHEDULED
Status: DISCONTINUED | OUTPATIENT
Start: 2024-12-28 | End: 2025-01-01 | Stop reason: HOSPADM

## 2024-12-28 RX ORDER — PANTOPRAZOLE SODIUM 40 MG/1
40 TABLET, DELAYED RELEASE ORAL
Status: DISCONTINUED | OUTPATIENT
Start: 2024-12-29 | End: 2025-01-01 | Stop reason: HOSPADM

## 2024-12-28 RX ORDER — ENOXAPARIN SODIUM 100 MG/ML
30 INJECTION SUBCUTANEOUS DAILY
Status: DISCONTINUED | OUTPATIENT
Start: 2024-12-28 | End: 2025-01-01 | Stop reason: HOSPADM

## 2024-12-28 RX ORDER — ASPIRIN 81 MG/1
81 TABLET, CHEWABLE ORAL DAILY
Status: DISCONTINUED | OUTPATIENT
Start: 2024-12-29 | End: 2025-01-01 | Stop reason: HOSPADM

## 2024-12-28 RX ORDER — ONDANSETRON 4 MG/1
4 TABLET, ORALLY DISINTEGRATING ORAL EVERY 8 HOURS PRN
Status: DISCONTINUED | OUTPATIENT
Start: 2024-12-28 | End: 2024-12-28 | Stop reason: CLARIF

## 2024-12-28 RX ORDER — SODIUM CHLORIDE 9 MG/ML
INJECTION, SOLUTION INTRAVENOUS PRN
Status: DISCONTINUED | OUTPATIENT
Start: 2024-12-28 | End: 2025-01-01 | Stop reason: HOSPADM

## 2024-12-28 RX ORDER — 0.9 % SODIUM CHLORIDE 0.9 %
1000 INTRAVENOUS SOLUTION INTRAVENOUS ONCE
Status: COMPLETED | OUTPATIENT
Start: 2024-12-28 | End: 2024-12-28

## 2024-12-28 RX ORDER — FINASTERIDE 5 MG/1
5 TABLET, FILM COATED ORAL NIGHTLY
Status: DISCONTINUED | OUTPATIENT
Start: 2024-12-28 | End: 2025-01-01 | Stop reason: HOSPADM

## 2024-12-28 RX ORDER — IPRATROPIUM BROMIDE AND ALBUTEROL SULFATE 2.5; .5 MG/3ML; MG/3ML
1 SOLUTION RESPIRATORY (INHALATION)
Status: DISCONTINUED | OUTPATIENT
Start: 2024-12-28 | End: 2025-01-01 | Stop reason: HOSPADM

## 2024-12-28 RX ORDER — PROCHLORPERAZINE MALEATE 5 MG/1
5 TABLET ORAL EVERY 8 HOURS PRN
Status: DISCONTINUED | OUTPATIENT
Start: 2024-12-28 | End: 2025-01-01 | Stop reason: HOSPADM

## 2024-12-28 RX ORDER — AMIODARONE HYDROCHLORIDE 200 MG/1
200 TABLET ORAL DAILY
Status: DISCONTINUED | OUTPATIENT
Start: 2024-12-29 | End: 2025-01-01 | Stop reason: HOSPADM

## 2024-12-28 RX ORDER — ATORVASTATIN CALCIUM 20 MG/1
20 TABLET, FILM COATED ORAL DAILY
Status: DISCONTINUED | OUTPATIENT
Start: 2024-12-29 | End: 2025-01-01 | Stop reason: HOSPADM

## 2024-12-28 RX ORDER — SODIUM CHLORIDE 9 MG/ML
INJECTION, SOLUTION INTRAVENOUS CONTINUOUS
Status: ACTIVE | OUTPATIENT
Start: 2024-12-28 | End: 2024-12-29

## 2024-12-28 RX ORDER — ONDANSETRON 2 MG/ML
4 INJECTION INTRAMUSCULAR; INTRAVENOUS EVERY 6 HOURS PRN
Status: DISCONTINUED | OUTPATIENT
Start: 2024-12-28 | End: 2024-12-28 | Stop reason: CLARIF

## 2024-12-28 RX ORDER — ISOSORBIDE DINITRATE 10 MG/1
20 TABLET ORAL 3 TIMES DAILY
Status: DISCONTINUED | OUTPATIENT
Start: 2024-12-28 | End: 2025-01-01 | Stop reason: HOSPADM

## 2024-12-28 RX ORDER — HYDRALAZINE HYDROCHLORIDE 25 MG/1
25 TABLET, FILM COATED ORAL 3 TIMES DAILY
Status: DISCONTINUED | OUTPATIENT
Start: 2024-12-28 | End: 2025-01-01 | Stop reason: HOSPADM

## 2024-12-28 RX ORDER — TAMSULOSIN HYDROCHLORIDE 0.4 MG/1
0.4 CAPSULE ORAL NIGHTLY
Status: DISCONTINUED | OUTPATIENT
Start: 2024-12-28 | End: 2025-01-01 | Stop reason: HOSPADM

## 2024-12-28 RX ORDER — SODIUM CHLORIDE 0.9 % (FLUSH) 0.9 %
5-40 SYRINGE (ML) INJECTION PRN
Status: DISCONTINUED | OUTPATIENT
Start: 2024-12-28 | End: 2025-01-01 | Stop reason: HOSPADM

## 2024-12-28 RX ORDER — METOPROLOL SUCCINATE 100 MG/1
100 TABLET, EXTENDED RELEASE ORAL 2 TIMES DAILY
Status: DISCONTINUED | OUTPATIENT
Start: 2024-12-28 | End: 2025-01-01 | Stop reason: HOSPADM

## 2024-12-28 RX ORDER — FLUTICASONE PROPIONATE 50 MCG
1 SPRAY, SUSPENSION (ML) NASAL DAILY PRN
Status: DISCONTINUED | OUTPATIENT
Start: 2024-12-29 | End: 2025-01-01 | Stop reason: HOSPADM

## 2024-12-28 RX ADMIN — WATER 2000 MG: 1 INJECTION INTRAMUSCULAR; INTRAVENOUS; SUBCUTANEOUS at 22:42

## 2024-12-28 RX ADMIN — DOXYCYCLINE 100 MG: 100 INJECTION, POWDER, LYOPHILIZED, FOR SOLUTION INTRAVENOUS at 22:50

## 2024-12-28 RX ADMIN — SODIUM CHLORIDE: 9 INJECTION, SOLUTION INTRAVENOUS at 22:38

## 2024-12-28 RX ADMIN — IPRATROPIUM BROMIDE AND ALBUTEROL SULFATE 1 DOSE: .5; 2.5 SOLUTION RESPIRATORY (INHALATION) at 20:07

## 2024-12-28 RX ADMIN — BUSPIRONE HYDROCHLORIDE 10 MG: 10 TABLET ORAL at 22:54

## 2024-12-28 RX ADMIN — ENOXAPARIN SODIUM 30 MG: 100 INJECTION SUBCUTANEOUS at 22:38

## 2024-12-28 RX ADMIN — SODIUM CHLORIDE 1000 ML: 9 INJECTION, SOLUTION INTRAVENOUS at 19:40

## 2024-12-28 ASSESSMENT — LIFESTYLE VARIABLES
HOW MANY STANDARD DRINKS CONTAINING ALCOHOL DO YOU HAVE ON A TYPICAL DAY: 1 OR 2
HOW OFTEN DO YOU HAVE A DRINK CONTAINING ALCOHOL: NEVER

## 2024-12-28 ASSESSMENT — PAIN SCALES - GENERAL
PAINLEVEL_OUTOF10: 0
PAINLEVEL_OUTOF10: 3

## 2024-12-28 ASSESSMENT — PAIN - FUNCTIONAL ASSESSMENT: PAIN_FUNCTIONAL_ASSESSMENT: 0-10

## 2024-12-28 NOTE — ED PROVIDER NOTES
[facility] EMERGENCY DEPARTMENT  EMERGENCY DEPARTMENT ENCOUNTER        Pt Name: Bret Betlrán  MRN: [id number]  Birthdate 1950  Date of evaluation: 12/28/2024  Provider: Marquis Garcia DO  PCP: Mariano Swift MD  Note Started: 3:48 PM EST 12/28/24    CHIEF COMPLAINT       Chief Complaint   Patient presents with    Dysuria    Urinary Frequency    Altered Mental Status       HISTORY OF PRESENT ILLNESS: 1 or more Elements   History From: patient, EMS and spouse    Limitations to history : None    Bret Beltrán is a 74 y.o. male who presents with unsteadiness, increased weakness and mild confusion.  I spoke with patient's wife who noted that he has been feeling more weak and unsteady over the last 2 to 3 days.  He notes he has been mildly more confused however she does endorse that he is somewhat forgetful at baseline.  He has no formal diagnosis of dementia.  The patient currently only complains of feeling more weak and some mild chest pain to his left side of his chest beginning this morning.  He states that has been intermittent today and does not radiate anywhere.  It is only mild in severity.  He also complains of some lower abdominal pain has been going on for the last few days.    Patient denies fever, chills, headache, shortness of breath, abdominal pain, nausea, vomiting, diarrhea, lightheadedness, dysuria, hematuria, hematochezia, and melena.    Nursing Notes were all reviewed and agreed with or any disagreements were addressed in the HPI.        REVIEW OF SYSTEMS :           Positives and Pertinent negatives as per HPI.     SURGICAL HISTORY     Past Surgical History:   Procedure Laterality Date    BACK SURGERY      CARDIAC CATHETERIZATION  09/15/2020    Dr. Leyva    CARDIAC DEFIBRILLATOR PLACEMENT  9/223/20    UPGRADE PPM TO DUAL ICD    ([facility])   DR. NEFF     CARDIAC PACEMAKER PLACEMENT  2014    CHOLECYSTECTOMY  1999    EYE SURGERY Left     FOOT    CREATININE, RANDOM URINE   SODIUM, URINE, RANDOM   BASIC METABOLIC PANEL   MAGNESIUM   CBC WITH AUTO DIFFERENTIAL       As interpreted by me, the above displayed labs are abnormal. All other labs obtained during this visit were within normal range or not returned as of this dictation.      EKG Interpretation  Interpreted by emergency department physician, Marquis Garcia DO    Performed 17:21  Atrial sensed ventricular paced rhythm.  Rate of 80 bpm.  No acute changes compared to prior.      RADIOLOGY:   Non-plain film images such as CT, Ultrasound and MRI are read by the radiologist. Plain radiographic images are visualized and preliminarily interpreted by the ED Provider with the below findings:      Interpretation per the Radiologist below, if available at the time of this note:    CT Head W/O Contrast   Final Result   No acute intracranial abnormality.  Follow-up evaluation is recommended         CT ABDOMEN PELVIS WO CONTRAST Additional Contrast? None   Final Result   1. No acute intra-abdominal or pelvic process.         CT CHEST WO CONTRAST   Final Result   1. Predominately left lower lung nodular and ground-glass opacities   concerning for pneumonia. Recommend follow-up to resolution.   2. Coronary artery atherosclerotic disease.         XR CHEST PORTABLE   Final Result   1. No acute cardiopulmonary process.   2. Stable cardiac silhouette enlargement.           CT HEAD WO CONTRAST    Result Date: 2024                                      [facility]                                          [place], [place] [id number] [phone]                                               CT Scan Report                                                  Signed    Patient: KAYY LENZ                                                                MR#: [id number]  [id number]          : 1950

## 2024-12-29 ENCOUNTER — APPOINTMENT (OUTPATIENT)
Dept: GENERAL RADIOLOGY | Age: 74
DRG: 194 | End: 2024-12-29
Payer: MEDICARE

## 2024-12-29 PROBLEM — N17.9 AKI (ACUTE KIDNEY INJURY) (HCC): Status: ACTIVE | Noted: 2024-12-29

## 2024-12-29 LAB
ANION GAP SERPL CALCULATED.3IONS-SCNC: 14 MMOL/L (ref 7–16)
ATYPICAL LYMPHOCYTE ABSOLUTE COUNT: 0.03 K/UL (ref 0–0.46)
ATYPICAL LYMPHOCYTES: 1 % (ref 0–4)
B PARAP IS1001 DNA NPH QL NAA+NON-PROBE: NOT DETECTED
B PERT DNA SPEC QL NAA+PROBE: NOT DETECTED
B.E.: -2.4 MMOL/L (ref -3–3)
BASOPHILS # BLD: 0.07 K/UL (ref 0–0.2)
BASOPHILS NFR BLD: 2 % (ref 0–2)
BUN SERPL-MCNC: 81 MG/DL (ref 6–23)
C PNEUM DNA NPH QL NAA+NON-PROBE: NOT DETECTED
CALCIUM SERPL-MCNC: 7.9 MG/DL (ref 8.6–10.2)
CHLORIDE SERPL-SCNC: 99 MMOL/L (ref 98–107)
CO2 SERPL-SCNC: 20 MMOL/L (ref 22–29)
COHB: 1.4 % (ref 0–1.5)
CREAT SERPL-MCNC: 3.2 MG/DL (ref 0.7–1.2)
CREAT UR-MCNC: 211.2 MG/DL (ref 40–278)
CRITICAL: ABNORMAL
DATE ANALYZED: ABNORMAL
DATE OF COLLECTION: ABNORMAL
EOSINOPHIL # BLD: 0.03 K/UL (ref 0.05–0.5)
EOSINOPHILS RELATIVE PERCENT: 1 % (ref 0–6)
ERYTHROCYTE [DISTWIDTH] IN BLOOD BY AUTOMATED COUNT: 14.8 % (ref 11.5–15)
FIO2: 50 %
FLUAV RNA NPH QL NAA+NON-PROBE: NOT DETECTED
FLUBV RNA NPH QL NAA+NON-PROBE: NOT DETECTED
GFR, ESTIMATED: 20 ML/MIN/1.73M2
GLUCOSE BLD-MCNC: 133 MG/DL (ref 74–99)
GLUCOSE SERPL-MCNC: 144 MG/DL (ref 74–99)
HADV DNA NPH QL NAA+NON-PROBE: NOT DETECTED
HCO3: 22 MMOL/L (ref 22–26)
HCOV 229E RNA NPH QL NAA+NON-PROBE: NOT DETECTED
HCOV HKU1 RNA NPH QL NAA+NON-PROBE: NOT DETECTED
HCOV NL63 RNA NPH QL NAA+NON-PROBE: NOT DETECTED
HCOV OC43 RNA NPH QL NAA+NON-PROBE: NOT DETECTED
HCT VFR BLD AUTO: 35.1 % (ref 37–54)
HGB BLD-MCNC: 13.2 G/DL (ref 12.5–16.5)
HHB: 11.3 % (ref 0–5)
HMPV RNA NPH QL NAA+NON-PROBE: NOT DETECTED
HPIV1 RNA NPH QL NAA+NON-PROBE: NOT DETECTED
HPIV2 RNA NPH QL NAA+NON-PROBE: NOT DETECTED
HPIV3 RNA NPH QL NAA+NON-PROBE: NOT DETECTED
HPIV4 RNA NPH QL NAA+NON-PROBE: NOT DETECTED
LAB: ABNORMAL
LYMPHOCYTES NFR BLD: 1.08 K/UL (ref 1.5–4)
LYMPHOCYTES RELATIVE PERCENT: 27 % (ref 20–42)
Lab: 330
M PNEUMO DNA NPH QL NAA+NON-PROBE: NOT DETECTED
MAGNESIUM SERPL-MCNC: 1.8 MG/DL (ref 1.6–2.6)
MCH RBC QN AUTO: 29.9 PG (ref 26–35)
MCHC RBC AUTO-ENTMCNC: 37.6 G/DL (ref 32–34.5)
MCV RBC AUTO: 79.6 FL (ref 80–99.9)
METHB: 0.3 % (ref 0–1.5)
MICROORGANISM SPEC CULT: NO GROWTH
MODE: ABNORMAL
MONOCYTES NFR BLD: 0.14 K/UL (ref 0.1–0.95)
MONOCYTES NFR BLD: 4 % (ref 2–12)
NEUTROPHILS NFR BLD: 66 % (ref 43–80)
NEUTS SEG NFR BLD: 2.64 K/UL (ref 1.8–7.3)
O2 CONTENT: 17.4 ML/DL
O2 SATURATION: 88.5 % (ref 92–98.5)
O2HB: 87 % (ref 94–97)
OPERATOR ID: 3342
PATIENT TEMP: 37 C
PCO2: 36.6 MMHG (ref 35–45)
PEEP/CPAP: 8 CMH2O
PFO2: 1.15 MMHG/%
PH BLOOD GAS: 7.4 (ref 7.35–7.45)
PLATELET # BLD AUTO: 172 K/UL (ref 130–450)
PMV BLD AUTO: 12.6 FL (ref 7–12)
PO2: 57.7 MMHG (ref 75–100)
POTASSIUM SERPL-SCNC: 4.1 MMOL/L (ref 3.5–5)
PS: 12 CMH20
RBC # BLD AUTO: 4.41 M/UL (ref 3.8–5.8)
RBC # BLD: ABNORMAL 10*6/UL
RI(T): 4.46
RSV RNA NPH QL NAA+NON-PROBE: NOT DETECTED
RV+EV RNA NPH QL NAA+NON-PROBE: NOT DETECTED
SARS-COV-2 RNA NPH QL NAA+NON-PROBE: NOT DETECTED
SERVICE CMNT-IMP: NORMAL
SODIUM SERPL-SCNC: 133 MMOL/L (ref 132–146)
SOURCE, BLOOD GAS: ABNORMAL
SPECIMEN DESCRIPTION: NORMAL
SPECIMEN DESCRIPTION: NORMAL
THB: 14.2 G/DL (ref 11.5–16.5)
TIME ANALYZED: 337
TOTAL PROTEIN, URINE: 19 MG/DL (ref 0–12)
URINE TOTAL PROTEIN CREATININE RATIO: 0.09 (ref 0–0.2)
WBC OTHER # BLD: 4 K/UL (ref 4.5–11.5)

## 2024-12-29 PROCEDURE — 94640 AIRWAY INHALATION TREATMENT: CPT

## 2024-12-29 PROCEDURE — 6370000000 HC RX 637 (ALT 250 FOR IP): Performed by: STUDENT IN AN ORGANIZED HEALTH CARE EDUCATION/TRAINING PROGRAM

## 2024-12-29 PROCEDURE — 85025 COMPLETE CBC W/AUTO DIFF WBC: CPT

## 2024-12-29 PROCEDURE — 51798 US URINE CAPACITY MEASURE: CPT

## 2024-12-29 PROCEDURE — 6360000002 HC RX W HCPCS: Performed by: STUDENT IN AN ORGANIZED HEALTH CARE EDUCATION/TRAINING PROGRAM

## 2024-12-29 PROCEDURE — 94660 CPAP INITIATION&MGMT: CPT

## 2024-12-29 PROCEDURE — 82947 ASSAY GLUCOSE BLOOD QUANT: CPT

## 2024-12-29 PROCEDURE — 82805 BLOOD GASES W/O2 SATURATION: CPT

## 2024-12-29 PROCEDURE — 5A09357 ASSISTANCE WITH RESPIRATORY VENTILATION, LESS THAN 24 CONSECUTIVE HOURS, CONTINUOUS POSITIVE AIRWAY PRESSURE: ICD-10-PCS | Performed by: STUDENT IN AN ORGANIZED HEALTH CARE EDUCATION/TRAINING PROGRAM

## 2024-12-29 PROCEDURE — 51702 INSERT TEMP BLADDER CATH: CPT

## 2024-12-29 PROCEDURE — 99222 1ST HOSP IP/OBS MODERATE 55: CPT | Performed by: FAMILY MEDICINE

## 2024-12-29 PROCEDURE — 2580000003 HC RX 258: Performed by: INTERNAL MEDICINE

## 2024-12-29 PROCEDURE — 83735 ASSAY OF MAGNESIUM: CPT

## 2024-12-29 PROCEDURE — 2700000000 HC OXYGEN THERAPY PER DAY

## 2024-12-29 PROCEDURE — 2500000003 HC RX 250 WO HCPCS: Performed by: STUDENT IN AN ORGANIZED HEALTH CARE EDUCATION/TRAINING PROGRAM

## 2024-12-29 PROCEDURE — 2060000000 HC ICU INTERMEDIATE R&B

## 2024-12-29 PROCEDURE — 82570 ASSAY OF URINE CREATININE: CPT

## 2024-12-29 PROCEDURE — 80048 BASIC METABOLIC PNL TOTAL CA: CPT

## 2024-12-29 PROCEDURE — 71045 X-RAY EXAM CHEST 1 VIEW: CPT

## 2024-12-29 PROCEDURE — 84156 ASSAY OF PROTEIN URINE: CPT

## 2024-12-29 RX ORDER — DOXYCYCLINE 100 MG/1
100 CAPSULE ORAL EVERY 12 HOURS SCHEDULED
Status: DISCONTINUED | OUTPATIENT
Start: 2024-12-29 | End: 2025-01-01 | Stop reason: HOSPADM

## 2024-12-29 RX ORDER — MAGNESIUM SULFATE IN WATER 40 MG/ML
2000 INJECTION, SOLUTION INTRAVENOUS ONCE
Status: COMPLETED | OUTPATIENT
Start: 2024-12-29 | End: 2024-12-29

## 2024-12-29 RX ORDER — SODIUM CHLORIDE 9 MG/ML
INJECTION, SOLUTION INTRAVENOUS CONTINUOUS
Status: ACTIVE | OUTPATIENT
Start: 2024-12-29 | End: 2024-12-30

## 2024-12-29 RX ADMIN — SODIUM CHLORIDE, PRESERVATIVE FREE 10 ML: 5 INJECTION INTRAVENOUS at 09:21

## 2024-12-29 RX ADMIN — PREGABALIN 100 MG: 50 CAPSULE ORAL at 12:36

## 2024-12-29 RX ADMIN — DOXYCYCLINE HYCLATE 100 MG: 100 CAPSULE ORAL at 12:36

## 2024-12-29 RX ADMIN — MAGNESIUM SULFATE HEPTAHYDRATE 2000 MG: 40 INJECTION, SOLUTION INTRAVENOUS at 09:20

## 2024-12-29 RX ADMIN — IPRATROPIUM BROMIDE AND ALBUTEROL SULFATE 1 DOSE: .5; 2.5 SOLUTION RESPIRATORY (INHALATION) at 15:10

## 2024-12-29 RX ADMIN — METOPROLOL SUCCINATE 100 MG: 100 TABLET, FILM COATED, EXTENDED RELEASE ORAL at 21:45

## 2024-12-29 RX ADMIN — SODIUM CHLORIDE: 9 INJECTION, SOLUTION INTRAVENOUS at 21:31

## 2024-12-29 RX ADMIN — FINASTERIDE 5 MG: 5 TABLET, FILM COATED ORAL at 21:44

## 2024-12-29 RX ADMIN — SODIUM CHLORIDE, PRESERVATIVE FREE 10 ML: 5 INJECTION INTRAVENOUS at 21:39

## 2024-12-29 RX ADMIN — BUSPIRONE HYDROCHLORIDE 10 MG: 10 TABLET ORAL at 21:35

## 2024-12-29 RX ADMIN — WATER 2000 MG: 1 INJECTION INTRAMUSCULAR; INTRAVENOUS; SUBCUTANEOUS at 21:35

## 2024-12-29 RX ADMIN — IPRATROPIUM BROMIDE AND ALBUTEROL SULFATE 1 DOSE: .5; 2.5 SOLUTION RESPIRATORY (INHALATION) at 08:13

## 2024-12-29 RX ADMIN — DICLOFENAC SODIUM 4 G: 10 GEL TOPICAL at 21:34

## 2024-12-29 RX ADMIN — IPRATROPIUM BROMIDE AND ALBUTEROL SULFATE 1 DOSE: .5; 2.5 SOLUTION RESPIRATORY (INHALATION) at 12:46

## 2024-12-29 RX ADMIN — TAMSULOSIN HYDROCHLORIDE 0.4 MG: 0.4 CAPSULE ORAL at 00:16

## 2024-12-29 RX ADMIN — DOXYCYCLINE HYCLATE 100 MG: 100 CAPSULE ORAL at 21:35

## 2024-12-29 RX ADMIN — TAMSULOSIN HYDROCHLORIDE 0.4 MG: 0.4 CAPSULE ORAL at 21:35

## 2024-12-29 RX ADMIN — AMIODARONE HYDROCHLORIDE 200 MG: 200 TABLET ORAL at 12:36

## 2024-12-29 NOTE — ED NOTES
Bedside handoff given to YANE Velasco. Family updated on status of patient and notified he is being admitted.

## 2024-12-29 NOTE — ED NOTES
3:08 AM  12/29/2024    I was called to the bedside as the patient's oxygen was in the low 80s; he was not improving on increased nasal cannula so we placed him on a nonrebreather.  He seems to have wet breath sounds; he does not have any known history of CHF and I do not see that he is on any diuretics.  We patient patient on a nonrebreather, sat him up, and his oxygen is now 92%.  He does seem a little bit tachypneic.  He is easily arousable, alert and oriented and answers all questions appropriately.  I did discuss with the patient that if his respiratory status was not to improve would he be okay with being placed on a ventilator and he states that yes.  His CODE STATUS is full code.  I have ordered a stat ABG as well as portable chest x-ray, and we will place the patient on BiPAP.  He is admitted for pneumonia at this time.  His proBNP was normal.      Chest x-ray showed suggestion of vascular congestion.  On the BiPAP patient symptoms are improving.  ABG obtained before BiPAP placed showed hypoxemia.  He is alert and oriented and answering all questions appropriately.  Is agreeable to intubation if needed; remains full code.  Right now, he is significantly improved on BiPAP and doing better.  Will continue to monitor.      Trinity Bragg,   Emergency medicine     Trinity Bragg DO  12/29/24 0951

## 2024-12-29 NOTE — PROGRESS NOTES
[facility]  Progress Note    Chief Complaint   Patient presents with    Dysuria    Urinary Frequency    Altered Mental Status         Subjective:    Bret Beltrán refused CPAP machine overnight. .  Patient was having some heartburn issues overnight and received Tums for reports feeling better overall.  Was oriented to person place and time.  Denying chest pain.        Patient mentioned that he wants to go home today as his grandson is coming and he does not want to miss that event.      Review of Systems   All other systems reviewed and are negative.        Objective:    /87   Pulse 70   Temp 98 °F (36.7 °C) (Oral)   Resp 20   Ht 1.803 m (5' 11\")   Wt 108.9 kg (240 lb)   SpO2 93%   BMI 33.47 kg/m²       Physical Exam  Constitutional:       General: He is not in acute distress.     Appearance: He is not ill-appearing, toxic-appearing or diaphoretic.      Comments: Waiting 4 L nasal cannula oxygen   HENT:      Head: Atraumatic.   Eyes:      General: No scleral icterus.     Extraocular Movements: Extraocular movements intact.      Conjunctiva/sclera: Conjunctivae normal.   Cardiovascular:      Rate and Rhythm: Normal rate and regular rhythm.      Heart sounds: No murmur heard.  Pulmonary:      Effort: No respiratory distress.      Breath sounds: No wheezing, rhonchi or rales.   Abdominal:      General: There is no distension.      Palpations: Abdomen is soft.      Tenderness: There is no abdominal tenderness. There is no guarding.   Musculoskeletal:      Right lower leg: No edema.      Left lower leg: No edema.   Skin:     General: Skin is warm and dry.   Neurological:      Mental Status: He is alert. Mental status is at baseline.   Psychiatric:         Mood and Affect: Mood normal.           CBC with Differential:    Lab Results   Component Value Date/Time    WBC 4.0 12/29/2024 07:15 AM    RBC 4.41 12/29/2024 07:15 AM    HGB 13.2 12/29/2024 07:15 AM    HCT 35.1  12/29/2024 07:15 AM     12/29/2024 07:15 AM    MCV 79.6 12/29/2024 07:15 AM    MCH 29.9 12/29/2024 07:15 AM    MCHC 37.6 12/29/2024 07:15 AM    RDW 14.8 12/29/2024 07:15 AM    LYMPHOPCT 27 12/29/2024 07:15 AM    MONOPCT 4 12/29/2024 07:15 AM    EOSPCT 1 12/29/2024 07:15 AM    BASOPCT 2 12/29/2024 07:15 AM    MONOSABS 0.14 12/29/2024 07:15 AM    LYMPHSABS 1.08 12/29/2024 07:15 AM    EOSABS 0.03 12/29/2024 07:15 AM    BASOSABS 0.07 12/29/2024 07:15 AM     CMP:    Lab Results   Component Value Date/Time     12/29/2024 07:15 AM    K 4.1 12/29/2024 07:15 AM    K 4.3 08/13/2018 06:15 PM    CL 99 12/29/2024 07:15 AM    CO2 20 12/29/2024 07:15 AM    BUN 81 12/29/2024 07:15 AM    CREATININE 3.2 12/29/2024 07:15 AM    GFRAA >60 04/14/2021 11:48 AM    AGRATIO 1.2 12/25/2024 01:19 PM    LABGLOM 20 12/29/2024 07:15 AM    LABGLOM 58 12/21/2022 11:33 AM    GLUCOSE 144 12/29/2024 07:15 AM    CALCIUM 7.9 12/29/2024 07:15 AM    BILITOT 0.8 12/28/2024 04:24 PM    ALKPHOS 56 12/28/2024 04:24 PM    AST 23 12/28/2024 04:24 PM    ALT 52 12/28/2024 04:24 PM        Assessment:    Active Hospital Problems    Diagnosis Date Noted    EDD (acute kidney injury) (HCC) [N17.9] 12/29/2024    Pneumonia due to organism [J18.9] 12/28/2024       Plan:     Left lower lobe pneumonia  CT chest demonstrating groundglass opacity in left lower lobe.  -Supplemental oxygen as needed  -Pending blood cultures  -ceftriaxone and doxycycline day 2   -Duonebs Q4H while awake  -Flonase twice daily     EDD, improving  Baseline creatinine 1.0.  Creatinine 3.6 on presentation.  Patient hypotensive and clinically dry on exam.  BNP mild elevation of 274.  Last estimated EF 69%  -FeNa 0.3% indicating prerenal  -Urine sodium 29  -Urine creatinine 234  -s/p IVF  -P.o. oral hydration orders  -Creatinine 3.6 > 3.2> 1.8     HFpEF, HTN  Stress test performed 6 months ago with no inducible ischemia, estimated EF improved to 69%  - PT was having soft blood pressure.

## 2024-12-29 NOTE — PROGRESS NOTES
Pt unable to void, bladder scan shows 338, straight cath for 700cc dark yellow urine. Sample sent per renal. Pt awake and alert at this time, bipap removed pt placed on 3L nasal cannula tolerating well at this time, pt answering questions appropriately. Message sent to provider regarding diet order now that pt is off bipap

## 2024-12-29 NOTE — PROGRESS NOTES
Dr. Smith on call for Dr. Rizzo.  Perfect serve sent informing doctor that patient has not voided since being admitted to floor. Bladder scan complete.  450 noted.

## 2024-12-29 NOTE — PROGRESS NOTES
4 Eyes Skin Assessment     NAME:  Bret Beltrán  YOB: 1950  MEDICAL RECORD NUMBER:  [id number]    The patient is being assessed for  Admission    I agree that at least one RN has performed a thorough Head to Toe Skin Assessment on the patient. ALL assessment sites listed below have been assessed.      Areas assessed by both nurses:    Head, Face, Ears, Shoulders, Back, Chest, Arms, Elbows, Hands, Sacrum. Buttock, Coccyx, Ischium, and Legs. Feet and Heels        Does the Patient have a Wound? No noted wound(s)       Jonas Prevention initiated by RN: No  Wound Care Orders initiated by RN: No    Pressure Injury (Stage 3,4, Unstageable, DTI, NWPT, and Complex wounds) if present, place Wound referral order by RN under : No    New Ostomies, if present place, Ostomy referral order under : No     Nurse 1 eSignature: Electronically signed by Candice Ramirez RN on 12/29/24 at 4:38 PM EST    **SHARE this note so that the co-signing nurse can place an eSignature**    Nurse 2 eSignature: Electronically signed by Sang Murphy RN on 12/29/24 at 4:44 PM EST

## 2024-12-29 NOTE — H&P
Formerly [facility]  Resident History and Physical      CHIEF COMPLAINT:    Chief Complaint   Patient presents with    Dysuria    Urinary Frequency    Altered Mental Status        History of Present Illness:   Bret Beltrán  is a 74 y.o. male patient of Mariano Swift MD  with a pertinent PMHx of CAD W/3 stents X1 0.5 years.  PTSD, depression, pacemaker, HTN, systolic HF, thrombocytopenia who presented to the ER from home with chief complaint of weakness.    Patient oriented to self and place but not month.  Answers most questions appropriately.  States for the last couple of weeks has been feeling weak, mildly confused, unsteady on his feet.  Denies fevers.  Did report nausea and vomiting that lasted for 2 days that ended 2 days ago.  Had a normal bowel movement this a.m. and is passing flatus.  Is having difficulty urinating.  Denies any discomfort or dysuria.  Does report that he has a chronic cough that is dry and unchanged.  Denied chest pain for me.  Does report shortness of breath even at rest.  He is a never smoker.  Used to drink a lot, quit 3 months ago.  Used to drink at least a sixpack of beer daily.  Denies drug use.    Recent stress test 6 months ago demonstrating no inducible ischemia and estimated EF improved to 69%.    In the emergency department patient was afebrile, RR 20, HR 81, BP 97/64, 95% on room air.  CBC grossly unremarkable. CMP notable for creatinine 3.6 and BUN 70, baseline creatinine 1.0.  Lipase 16, LA 1.2, mag 1.9, .  Troponin 27, repeat 25.  EKG atrial sensed ventricular paced rhythm.      Chest x-ray stable cardiac enlargement  CT head negative for acute process, presence of ventriculostomy  CT chest demonstrating groundglass opacity in the left lower lobe.  CT abdomen pelvis demonstrating no acute process.    Family medicine asked to admit patient for EDD and pneumonia.    ROS:   Review of Systems   All other systems reviewed and are     COHb 1.1 0.0 - 1.5 %    MetHb 0.3 0.0 - 1.5 %    O2 Content 18.8 mL/dL    HHb 8.7 (H) 0.0 - 5.0 %    tHb (est) 14.9 11.5 - 16.5 g/dL    Mode RA     Date Of Collection [id number]     Time Collected 1825     Pt Temp 37.0 C     ID [id number]     Lab [id number]     Critical(s) Notified . No Critical Values        CT Head W/O Contrast   Final Result   No acute intracranial abnormality.  Follow-up evaluation is recommended         CT ABDOMEN PELVIS WO CONTRAST Additional Contrast? None   Final Result   1. No acute intra-abdominal or pelvic process.         CT CHEST WO CONTRAST   Final Result   1. Predominately left lower lung nodular and ground-glass opacities   concerning for pneumonia. Recommend follow-up to resolution.   2. Coronary artery atherosclerotic disease.         XR CHEST PORTABLE   Final Result   1. No acute cardiopulmonary process.   2. Stable cardiac silhouette enlargement.             ASSESSMENT/PLAN:      Active Hospital Problems    Diagnosis Date Noted    Pneumonia due to organism [J18.9] 12/28/2024     Left lower lobe pneumonia  CT chest demonstrating groundglass opacity in left lower lobe.  -Admit to general floor  -Telemetry  -Supplemental oxygen as needed  -Obtain blood cultures  -Start ceftriaxone and doxycycline once blood cultures obtained  -Duonebs Q4H while awake  -Flonase twice daily    EDD  Baseline creatinine 1.0.  Creatinine 3.6 on presentation.  Patient hypotensive and clinically dry on exam.  BNP mild elevation of 274.  Last estimated EF 69%  -Urine creatinine and sodium  - cc/h for 20 hours    HFpEF, HTN  Stress test performed 6 months ago with no inducible ischemia, estimated EF improved to 69%  -Hold home hydralazine 25 mg 3 times daily  -Hold home Isordil 20 mg 3 times daily  -Hold home metoprolol  mg BID  -Hold Entresto 24 - 26 mg  -Continue home Jardiance 10 mg daily    CAD, A-fib status post pacemaker/defibrillator  Status post 3 stents 1 and half years ago.  Pacemaker in

## 2024-12-29 NOTE — ED NOTES
Patient straight cath'd for 700 cc of chantel colored clear urine. Patient felt relief after straight cath.

## 2024-12-29 NOTE — PROGRESS NOTES
[facility]  Progress Note    Chief Complaint   Patient presents with    Dysuria    Urinary Frequency    Altered Mental Status         Subjective:    Bret Beltrán was seen this morning lying in ER bed.  On CPAP.  Reports feeling better overall.  Was oriented to person place and time.  Denying chest pain.  Does report minor lower abdominal pain.  Passed stool yesterday AM.  Is passing flatus.    Awaiting transfer to the floor      Review of Systems   All other systems reviewed and are negative.        Objective:    BP (!) 120/93   Pulse 82   Temp 98 °F (36.7 °C) (Oral)   Resp 26   Ht 1.803 m (5' 11\")   Wt 108.9 kg (240 lb)   SpO2 99%   BMI 33.47 kg/m²       Physical Exam  Constitutional:       General: He is not in acute distress.     Appearance: He is not ill-appearing, toxic-appearing or diaphoretic.   HENT:      Head: Atraumatic.   Eyes:      General: No scleral icterus.     Extraocular Movements: Extraocular movements intact.      Conjunctiva/sclera: Conjunctivae normal.   Cardiovascular:      Rate and Rhythm: Normal rate and regular rhythm.      Heart sounds: No murmur heard.  Pulmonary:      Effort: No respiratory distress.      Breath sounds: No wheezing, rhonchi or rales.   Abdominal:      General: There is no distension.      Palpations: Abdomen is soft.      Tenderness: There is abdominal tenderness. There is no guarding.      Comments: Minor tenderness to palpation of bilateral lower quadrants.  No involuntary or active guarding   Musculoskeletal:      Right lower leg: No edema.      Left lower leg: No edema.   Skin:     General: Skin is warm and dry.   Neurological:      Mental Status: He is alert. Mental status is at baseline.   Psychiatric:         Mood and Affect: Mood normal.           CBC with Differential:    Lab Results   Component Value Date/Time    WBC 4.0 12/29/2024 07:15 AM    RBC 4.41 12/29/2024 07:15 AM    HGB 13.2 12/29/2024 07:15 AM    HCT 35.1  12/29/2024 07:15 AM     12/29/2024 07:15 AM    MCV 79.6 12/29/2024 07:15 AM    MCH 29.9 12/29/2024 07:15 AM    MCHC 37.6 12/29/2024 07:15 AM    RDW 14.8 12/29/2024 07:15 AM    LYMPHOPCT 27 12/29/2024 07:15 AM    MONOPCT 4 12/29/2024 07:15 AM    EOSPCT 1 12/29/2024 07:15 AM    BASOPCT 2 12/29/2024 07:15 AM    MONOSABS 0.14 12/29/2024 07:15 AM    LYMPHSABS 1.08 12/29/2024 07:15 AM    EOSABS 0.03 12/29/2024 07:15 AM    BASOSABS 0.07 12/29/2024 07:15 AM     CMP:    Lab Results   Component Value Date/Time     12/29/2024 07:15 AM    K 4.1 12/29/2024 07:15 AM    K 4.3 08/13/2018 06:15 PM    CL 99 12/29/2024 07:15 AM    CO2 20 12/29/2024 07:15 AM    BUN 81 12/29/2024 07:15 AM    CREATININE 3.2 12/29/2024 07:15 AM    GFRAA >60 04/14/2021 11:48 AM    AGRATIO 1.2 12/25/2024 01:19 PM    LABGLOM 20 12/29/2024 07:15 AM    LABGLOM 58 12/21/2022 11:33 AM    GLUCOSE 144 12/29/2024 07:15 AM    CALCIUM 7.9 12/29/2024 07:15 AM    BILITOT 0.8 12/28/2024 04:24 PM    ALKPHOS 56 12/28/2024 04:24 PM    AST 23 12/28/2024 04:24 PM    ALT 52 12/28/2024 04:24 PM        Assessment:    Active Hospital Problems    Diagnosis Date Noted    Pneumonia due to organism [J18.9] 12/28/2024       Plan:     Left lower lobe pneumonia  CT chest demonstrating groundglass opacity in left lower lobe.  -Admit to general floor  -Telemetry  -Supplemental oxygen as needed  -Obtain blood cultures  -Start ceftriaxone and doxycycline once blood cultures obtained  -Duonebs Q4H while awake  -Flonase twice daily     EDD, improving  Baseline creatinine 1.0.  Creatinine 3.6 on presentation.  Patient hypotensive and clinically dry on exam.  BNP mild elevation of 274.  Last estimated EF 69%  -FeNa 0.3% indicating prerenal  -Urine sodium 29  -Urine creatinine 234  - cc/h for 20 hours  -P.o. oral hydration orders  -Creatinine 3.6 > 3.2     HFpEF, HTN  Stress test performed 6 months ago with no inducible ischemia, estimated EF improved to 69%  -Hold home

## 2024-12-30 LAB
ALBUMIN SERPL-MCNC: 3 G/DL (ref 3.5–5.2)
ALP SERPL-CCNC: 49 U/L (ref 40–129)
ALT SERPL-CCNC: 36 U/L (ref 0–40)
ANION GAP SERPL CALCULATED.3IONS-SCNC: 13 MMOL/L (ref 7–16)
AST SERPL-CCNC: 27 U/L (ref 0–39)
BASOPHILS # BLD: 0.01 K/UL (ref 0–0.2)
BASOPHILS NFR BLD: 0 % (ref 0–2)
BILIRUB SERPL-MCNC: 0.7 MG/DL (ref 0–1.2)
BUN SERPL-MCNC: 68 MG/DL (ref 6–23)
CALCIUM SERPL-MCNC: 8.7 MG/DL (ref 8.6–10.2)
CHLORIDE SERPL-SCNC: 102 MMOL/L (ref 98–107)
CO2 SERPL-SCNC: 21 MMOL/L (ref 22–29)
CREAT SERPL-MCNC: 1.8 MG/DL (ref 0.7–1.2)
EKG ATRIAL RATE: 80 BPM
EKG P AXIS: 29 DEGREES
EKG P-R INTERVAL: 168 MS
EKG Q-T INTERVAL: 460 MS
EKG QRS DURATION: 158 MS
EKG QTC CALCULATION (BAZETT): 530 MS
EKG R AXIS: 175 DEGREES
EKG T AXIS: 82 DEGREES
EKG VENTRICULAR RATE: 80 BPM
EOSINOPHIL # BLD: 0.01 K/UL (ref 0.05–0.5)
EOSINOPHILS RELATIVE PERCENT: 0 % (ref 0–6)
ERYTHROCYTE [DISTWIDTH] IN BLOOD BY AUTOMATED COUNT: 14.6 % (ref 11.5–15)
GFR, ESTIMATED: 39 ML/MIN/1.73M2
GLUCOSE SERPL-MCNC: 123 MG/DL (ref 74–99)
HCT VFR BLD AUTO: 34.5 % (ref 37–54)
HGB BLD-MCNC: 12.4 G/DL (ref 12.5–16.5)
IMM GRANULOCYTES # BLD AUTO: <0.03 K/UL (ref 0–0.58)
IMM GRANULOCYTES NFR BLD: 0 % (ref 0–5)
LYMPHOCYTES NFR BLD: 0.79 K/UL (ref 1.5–4)
LYMPHOCYTES RELATIVE PERCENT: 8 % (ref 20–42)
MAGNESIUM SERPL-MCNC: 2.7 MG/DL (ref 1.6–2.6)
MCH RBC QN AUTO: 29.5 PG (ref 26–35)
MCHC RBC AUTO-ENTMCNC: 35.9 G/DL (ref 32–34.5)
MCV RBC AUTO: 82.1 FL (ref 80–99.9)
MONOCYTES NFR BLD: 0.6 K/UL (ref 0.1–0.95)
MONOCYTES NFR BLD: 6 % (ref 2–12)
NEUTROPHILS NFR BLD: 85 % (ref 43–80)
NEUTS SEG NFR BLD: 8.26 K/UL (ref 1.8–7.3)
PHOSPHATE SERPL-MCNC: 2.6 MG/DL (ref 2.5–4.5)
PLATELET # BLD AUTO: 141 K/UL (ref 130–450)
PMV BLD AUTO: 11.9 FL (ref 7–12)
POTASSIUM SERPL-SCNC: 3.9 MMOL/L (ref 3.5–5)
PROT SERPL-MCNC: 6.6 G/DL (ref 6.4–8.3)
RBC # BLD AUTO: 4.2 M/UL (ref 3.8–5.8)
RBC # BLD: ABNORMAL 10*6/UL
SODIUM SERPL-SCNC: 136 MMOL/L (ref 132–146)
WBC OTHER # BLD: 9.7 K/UL (ref 4.5–11.5)

## 2024-12-30 PROCEDURE — 2060000000 HC ICU INTERMEDIATE R&B

## 2024-12-30 PROCEDURE — 97530 THERAPEUTIC ACTIVITIES: CPT

## 2024-12-30 PROCEDURE — 6370000000 HC RX 637 (ALT 250 FOR IP): Performed by: STUDENT IN AN ORGANIZED HEALTH CARE EDUCATION/TRAINING PROGRAM

## 2024-12-30 PROCEDURE — 83735 ASSAY OF MAGNESIUM: CPT

## 2024-12-30 PROCEDURE — 80053 COMPREHEN METABOLIC PANEL: CPT

## 2024-12-30 PROCEDURE — 85025 COMPLETE CBC W/AUTO DIFF WBC: CPT

## 2024-12-30 PROCEDURE — 97161 PT EVAL LOW COMPLEX 20 MIN: CPT

## 2024-12-30 PROCEDURE — 84100 ASSAY OF PHOSPHORUS: CPT

## 2024-12-30 PROCEDURE — 36415 COLL VENOUS BLD VENIPUNCTURE: CPT

## 2024-12-30 PROCEDURE — 94640 AIRWAY INHALATION TREATMENT: CPT

## 2024-12-30 PROCEDURE — 6360000002 HC RX W HCPCS: Performed by: STUDENT IN AN ORGANIZED HEALTH CARE EDUCATION/TRAINING PROGRAM

## 2024-12-30 PROCEDURE — 2700000000 HC OXYGEN THERAPY PER DAY

## 2024-12-30 PROCEDURE — 93010 ELECTROCARDIOGRAM REPORT: CPT | Performed by: INTERNAL MEDICINE

## 2024-12-30 PROCEDURE — 2500000003 HC RX 250 WO HCPCS: Performed by: STUDENT IN AN ORGANIZED HEALTH CARE EDUCATION/TRAINING PROGRAM

## 2024-12-30 PROCEDURE — 94660 CPAP INITIATION&MGMT: CPT

## 2024-12-30 PROCEDURE — 6370000000 HC RX 637 (ALT 250 FOR IP)

## 2024-12-30 PROCEDURE — 99232 SBSQ HOSP IP/OBS MODERATE 35: CPT | Performed by: FAMILY MEDICINE

## 2024-12-30 RX ORDER — CALCIUM CARBONATE 500 MG/1
500 TABLET, CHEWABLE ORAL 3 TIMES DAILY PRN
Status: DISCONTINUED | OUTPATIENT
Start: 2024-12-30 | End: 2025-01-01 | Stop reason: HOSPADM

## 2024-12-30 RX ADMIN — IPRATROPIUM BROMIDE AND ALBUTEROL SULFATE 1 DOSE: .5; 2.5 SOLUTION RESPIRATORY (INHALATION) at 08:34

## 2024-12-30 RX ADMIN — DOXYCYCLINE HYCLATE 100 MG: 100 CAPSULE ORAL at 19:53

## 2024-12-30 RX ADMIN — TAMSULOSIN HYDROCHLORIDE 0.4 MG: 0.4 CAPSULE ORAL at 19:53

## 2024-12-30 RX ADMIN — SODIUM CHLORIDE, PRESERVATIVE FREE 10 ML: 5 INJECTION INTRAVENOUS at 19:56

## 2024-12-30 RX ADMIN — IPRATROPIUM BROMIDE AND ALBUTEROL SULFATE 1 DOSE: .5; 2.5 SOLUTION RESPIRATORY (INHALATION) at 16:03

## 2024-12-30 RX ADMIN — FINASTERIDE 5 MG: 5 TABLET, FILM COATED ORAL at 19:53

## 2024-12-30 RX ADMIN — ATORVASTATIN CALCIUM 20 MG: 20 TABLET, FILM COATED ORAL at 09:53

## 2024-12-30 RX ADMIN — PROCHLORPERAZINE EDISYLATE 10 MG: 5 INJECTION INTRAMUSCULAR; INTRAVENOUS at 03:55

## 2024-12-30 RX ADMIN — ENOXAPARIN SODIUM 30 MG: 100 INJECTION SUBCUTANEOUS at 19:54

## 2024-12-30 RX ADMIN — IPRATROPIUM BROMIDE AND ALBUTEROL SULFATE 1 DOSE: .5; 2.5 SOLUTION RESPIRATORY (INHALATION) at 20:14

## 2024-12-30 RX ADMIN — DOXYCYCLINE HYCLATE 100 MG: 100 CAPSULE ORAL at 09:53

## 2024-12-30 RX ADMIN — BUSPIRONE HYDROCHLORIDE 10 MG: 10 TABLET ORAL at 19:53

## 2024-12-30 RX ADMIN — METOPROLOL SUCCINATE 100 MG: 100 TABLET, FILM COATED, EXTENDED RELEASE ORAL at 19:53

## 2024-12-30 RX ADMIN — DICLOFENAC SODIUM 4 G: 10 GEL TOPICAL at 14:32

## 2024-12-30 RX ADMIN — DICLOFENAC SODIUM 4 G: 10 GEL TOPICAL at 19:56

## 2024-12-30 RX ADMIN — METOPROLOL SUCCINATE 100 MG: 100 TABLET, FILM COATED, EXTENDED RELEASE ORAL at 09:53

## 2024-12-30 RX ADMIN — WATER 2000 MG: 1 INJECTION INTRAMUSCULAR; INTRAVENOUS; SUBCUTANEOUS at 21:28

## 2024-12-30 RX ADMIN — PROCHLORPERAZINE EDISYLATE 10 MG: 5 INJECTION INTRAMUSCULAR; INTRAVENOUS at 11:29

## 2024-12-30 RX ADMIN — DICLOFENAC SODIUM 4 G: 10 GEL TOPICAL at 09:54

## 2024-12-30 RX ADMIN — SODIUM CHLORIDE, PRESERVATIVE FREE 10 ML: 5 INJECTION INTRAVENOUS at 09:55

## 2024-12-30 RX ADMIN — PANTOPRAZOLE SODIUM 40 MG: 40 TABLET, DELAYED RELEASE ORAL at 05:50

## 2024-12-30 RX ADMIN — ANTACID TABLETS 500 MG: 500 TABLET, CHEWABLE ORAL at 03:34

## 2024-12-30 RX ADMIN — POLYETHYLENE GLYCOL 3350 17 G: 17 POWDER, FOR SOLUTION ORAL at 11:29

## 2024-12-30 RX ADMIN — IPRATROPIUM BROMIDE AND ALBUTEROL SULFATE 1 DOSE: .5; 2.5 SOLUTION RESPIRATORY (INHALATION) at 12:27

## 2024-12-30 RX ADMIN — PREGABALIN 100 MG: 50 CAPSULE ORAL at 23:56

## 2024-12-30 RX ADMIN — AMIODARONE HYDROCHLORIDE 200 MG: 200 TABLET ORAL at 09:53

## 2024-12-30 RX ADMIN — EMPAGLIFLOZIN 10 MG: 10 TABLET, FILM COATED ORAL at 09:53

## 2024-12-30 RX ADMIN — BUSPIRONE HYDROCHLORIDE 10 MG: 10 TABLET ORAL at 14:32

## 2024-12-30 RX ADMIN — BUSPIRONE HYDROCHLORIDE 10 MG: 10 TABLET ORAL at 09:53

## 2024-12-30 RX ADMIN — ASPIRIN 81 MG CHEWABLE TABLET 81 MG: 81 TABLET CHEWABLE at 09:53

## 2024-12-30 RX ADMIN — FOLIC ACID 1 MG: 1 TABLET ORAL at 09:53

## 2024-12-30 RX ADMIN — DICLOFENAC SODIUM 4 G: 10 GEL TOPICAL at 17:20

## 2024-12-30 ASSESSMENT — PAIN DESCRIPTION - LOCATION: LOCATION: ABDOMEN

## 2024-12-30 ASSESSMENT — PAIN SCALES - GENERAL
PAINLEVEL_OUTOF10: 0
PAINLEVEL_OUTOF10: 2
PAINLEVEL_OUTOF10: 0

## 2024-12-30 ASSESSMENT — PAIN DESCRIPTION - ORIENTATION: ORIENTATION: RIGHT;LEFT

## 2024-12-30 ASSESSMENT — PAIN DESCRIPTION - DESCRIPTORS: DESCRIPTORS: ACHING

## 2024-12-30 NOTE — PLAN OF CARE
Problem: Safety - Adult  Goal: Free from fall injury  12/30/2024 1316 by Jocelyn Su RN  Outcome: Progressing  12/30/2024 0043 by Sagrario Yusuf, RN  Outcome: Progressing     Problem: Chronic Conditions and Co-morbidities  Goal: Patient's chronic conditions and co-morbidity symptoms are monitored and maintained or improved  12/30/2024 1316 by Jocelyn Su RN  Outcome: Progressing  12/30/2024 0043 by Sagrario Yusuf, RN  Outcome: Progressing     Problem: Discharge Planning  Goal: Discharge to home or other facility with appropriate resources  12/30/2024 1316 by Jocelyn Su RN  Outcome: Progressing  12/30/2024 0043 by Sagrario Yusuf, RN  Outcome: Progressing     Problem: Pain  Goal: Verbalizes/displays adequate comfort level or baseline comfort level  12/30/2024 1316 by Jocelyn Su RN  Outcome: Progressing  12/30/2024 0043 by Sagrario Yusuf, RN  Outcome: Progressing     Problem: ABCDS Injury Assessment  Goal: Absence of physical injury  12/30/2024 1316 by Jocelyn Su RN  Outcome: Progressing  12/30/2024 0043 by Sagrario Yusuf, RN  Outcome: Progressing

## 2024-12-30 NOTE — PLAN OF CARE
Problem: Safety - Adult  Goal: Free from fall injury  12/30/2024 0043 by Sagrario Yusuf RN  Outcome: Progressing  12/29/2024 1637 by Candice Ramirez RN  Outcome: Progressing     Problem: Chronic Conditions and Co-morbidities  Goal: Patient's chronic conditions and co-morbidity symptoms are monitored and maintained or improved  12/30/2024 0043 by Sagrario Yusuf RN  Outcome: Progressing  12/29/2024 1637 by Candice Ramirez RN  Outcome: Progressing     Problem: Discharge Planning  Goal: Discharge to home or other facility with appropriate resources  12/30/2024 0043 by Sagrario Yusuf RN  Outcome: Progressing  12/29/2024 1637 by Candice Ramirez RN  Outcome: Progressing     Problem: Pain  Goal: Verbalizes/displays adequate comfort level or baseline comfort level  12/30/2024 0043 by Sagrario Yusuf RN  Outcome: Progressing  12/29/2024 1637 by Candice Ramirez RN  Outcome: Progressing     Problem: ABCDS Injury Assessment  Goal: Absence of physical injury  12/30/2024 0043 by Sagrario Yusuf RN  Outcome: Progressing  12/29/2024 1637 by Candice Ramirez RN  Outcome: Progressing

## 2024-12-30 NOTE — CONSULTS
12/30/2024 10:02 AM  Bret Beltrán  [id number]     Chief Complaint:    Urinary retention    History of Present Illness:      The patient is a 74 y.o. male patient who presented to the hospital with complaints of dysuria, urinary frequency, altered mental status.    Urology is asked to consult for urinary retention.  He is a known patient to Dr. Nasir Jovel in [facility].  He presents to the [facility] due to lack of urology coverage at [facility].  He does endorse some intermittent leakage more recently.  He has been on Flomax.  He denies any prior history of surgical intervention on his prostate.  He has never taken finasteride.  He was straight cathed multiple times for greater than 500 cc of urine.  He currently has a Allison indwelling with clear yellow urine in the tubing.      Past Medical History:   Diagnosis Date    Cerebral artery occlusion with cerebral infarction (HCC)     Chronic back pain     Chronic systolic congestive heart failure (HCC) 11/28/2023    Coronary artery disease involving native coronary artery of native heart without angina pectoris 7/5/2022    Depression     Hyperlipidemia     Hypertension     Pacemaker     Sleep apnea     TIA (transient ischemic attack)     Ventricular tachycardia (HCC)          Past Surgical History:   Procedure Laterality Date    BACK SURGERY      CARDIAC CATHETERIZATION  09/15/2020    Dr. Leyva    CARDIAC DEFIBRILLATOR PLACEMENT  9/223/20    UPGRADE PPM TO DUAL ICD    ([facility])   DR. NEFF     CARDIAC PACEMAKER PLACEMENT  2014    CHOLECYSTECTOMY  1999    EYE SURGERY Left     FOOT SURGERY      KNEE SURGERY Left 2020    Dr. Nirali Abrams    VENTRICULOPERITONEAL SHUNT  1994       Medications Prior to Admission:    Medications Prior to Admission: pregabalin (LYRICA) 100 MG capsule, TAKE ONE CAPSULE BY MOUTH THREE TIMES DAILY AS NEEDED FOR PAIN  oxyBUTYnin (DITROPAN-XL) 10 MG extended release tablet, Take 1 tablet by mouth daily  folic  acid (FOLVITE) 1 MG tablet, Take 1 tablet by mouth daily  tamsulosin (FLOMAX) 0.4 MG capsule, Take 1 capsule by mouth at bedtime  albuterol sulfate HFA (VENTOLIN HFA) 108 (90 Base) MCG/ACT inhaler, Inhale 2 puffs into the lungs 4 times daily as needed for Wheezing  sacubitril-valsartan (ENTRESTO) 24-26 MG per tablet, Take by mouth  lidocaine (LIDODERM) 5 %, Place 1 patch onto the skin daily 12 hours on, 12 hours off.  omeprazole (PRILOSEC) 20 MG delayed release capsule, Take 1 capsule by mouth Daily  metoprolol succinate (TOPROL XL) 100 MG extended release tablet, Take 1 tablet by mouth in the morning and at bedtime  amiodarone (CORDARONE) 200 MG tablet, Take 1 tablet by mouth daily  empagliflozin (JARDIANCE) 10 MG tablet, Take 1 tablet by mouth  finasteride (PROSCAR) 5 MG tablet, Take 1 tablet by mouth at bedtime  hydrALAZINE (APRESOLINE) 25 MG tablet, Take 1 tablet by mouth 3 times daily  isosorbide dinitrate (ISORDIL) 20 MG tablet, Take 1 tablet by mouth 3 times daily  ondansetron (ZOFRAN) 4 MG tablet, Take 1 tablet by mouth every 8 hours as needed for Nausea or Vomiting  diclofenac sodium (VOLTAREN) 1 % GEL, Apply 4 g topically 4 times daily (Patient taking differently: Apply 4 g topically 4 times daily as needed)  atorvastatin (LIPITOR) 20 MG tablet, Take 1 tablet by mouth daily  fluticasone (FLONASE ALLERGY RELIEF) 50 MCG/ACT nasal spray, 1 spray by Each Nostril route daily (Patient taking differently: 1 spray by Each Nostril route daily as needed)  busPIRone (BUSPAR) 10 MG tablet, Take 1 tablet by mouth 3 times daily  aspirin 81 MG chewable tablet, Take by mouth daily   sildenafil (VIAGRA) 100 MG tablet,   ciclopirox (LOPROX) 0.77 % cream, Apply topically 2 times daily.  zinc oxide (DESITIN) 40 % ointment, Apply topically as needed.    Allergies:    Bee venom, Naproxen, and Meloxicam    Social History:    reports that he quit smoking about 56 years ago. His smoking use included cigarettes. He started smoking

## 2024-12-30 NOTE — CARE COORDINATION
CM attempted to assess patient, he was unable to stay awake during conversation. CM spoke to wife, Jess via phone. Admitted for pneumonia, EDD. Nephrology, Urology are following. Plan to do voiding trial per urology can discharge with martínez catheter, no acute  intervention will f/u OP. Patients wife voices he resides in a 2 story home with his spouse and completes his adl's with some assistance. Patients wife will not consider SNF but would like [facility], referral made to liaison Ayesha, [facility] orders completed. Patient is established with Dr. Swift.Will continue to follow.  Crystal DOSSN, RN  Case Management

## 2024-12-30 NOTE — PROGRESS NOTES
Daughter in law at bedside concerned about patient's glucose level. Requesting an A1C be drawn. [employer] sent to [facility] notifying them of family's request.

## 2024-12-30 NOTE — PROGRESS NOTES
place, and year but not month.  Sensation: Pt denies numbness and tingling of extremities.   Edema: Unremarkable    Patient education  Pt educated on PT role in acute care setting.    Patient response to education:   Pt verbalized understanding Pt demonstrated skill Pt requires further education in this area   Yes NA No     ASSESSMENT:    Conditions Requiring Skilled Therapeutic Intervention:    [x]Decreased strength     []Decreased ROM  [x]Decreased functional mobility  [x]Decreased balance   [x]Decreased endurance   [x]Decreased posture  []Decreased sensation  []Decreased coordination   []Decreased vision  [x]Decreased safety awareness   []Increased pain       Comments:    Pt was in bed upon room entry; agreeable to PT evaluation. Pt completed all mobility noted above. Pt ambulated several times into hallway with WW. Gait was slow and mildly unsteady. Cues required for line safety. O2 sat was 92-95% on 3 L O2/min throughout session. Pt verbalized understanding to call for assist when he wishes to return to bed. Pt was left in chair with all needs met at conclusion of session. Alarm activated and RN notified.    Treatment:  Patient practiced and was instructed in the following treatment:    Therapeutic activities:  Bed mobility: Pt was cued for technique during bed mobility transfers.  Transfers: Pt was cued for hand placement during sit <> stand transfers. Pt completed multiple transfers from different surface heights (EOB x1, chair x2).  Ambulation: Pt was cued for WW technique and line safety when ambulating. Pt ambulated x2 bouts.  Vitals and symptoms were closely monitored throughout session.    Pt's/family goals:  1. To return home.    Prognosis is Good for reaching above PT goals.    Patient and or family understand(s) diagnosis, prognosis, and plan of care.  Yes    PHYSICAL THERAPY PLAN OF CARE:    PT POC is established based on physician order and patient diagnosis     Referring provider/PT Order:    Start    Ordering Provider    12/28/24 2000  PT evaluation and treat  Start:  12/28/24 2000,   End:  12/28/24 2000,   ONE TIME,   Standing Count:  1 Occurrences,   R         Nasir Daniel DO      Diagnosis:  Pneumonia due to organism [J18.9]  EDD (acute kidney injury) (HCC) [N17.9]  Pneumonia of left lower lobe due to infectious organism [J18.9]  Other fatigue [R53.83]  Specific instructions for next treatment:  Progress activity    Current Treatment Recommendations:     [x] Strengthening to improve independence with functional mobility   [] ROM to improve independence with functional mobility   [x] Balance Training to improve static/dynamic balance and to reduce fall risk  [x] Endurance Training to improve activity tolerance during functional mobility   [x] Transfer Training to improve safety and independence with all functional transfers   [x] Gait Training to improve gait mechanics, endurance and assess need for appropriate assistive device  [x] Stair Training in preparation for safe discharge home and/or into the community   [] Positioning to prevent skin breakdown and contractures  [x] Safety and Education Training   [x] Patient/Caregiver Education   [] HEP  [] Other     PT long term treatment goals are located in above grid    Frequency of treatments: 2-5x/week x 1-2 weeks.    Time in  0920  Time out  0943    Total Treatment Time  8 minutes     Evaluation Time includes thorough review of current medical information, gathering information on past medical history/social history and prior level of function, completion of standardized testing/informal observation of tasks, assessment of data and education on plan of care and goals.    CPT codes:  [x] Low Complexity PT evaluation 82435  [] Moderate Complexity PT evaluation 58949  [] High Complexity PT evaluation 06100  [] PT Re-evaluation 93861  [] Gait training 81855 0 minutes  [] Manual therapy 18386 0 minutes  [x] Therapeutic activities 30061 8 minutes  []

## 2024-12-30 NOTE — PROGRESS NOTES
Spiritual Health History and Assessment/Progress Note  [facility]    Initial Encounter, Attempted Encounter,  ,  ,      Name: Bret Beltrán MRN: [id number]    Age: 74 y.o.     Sex: male   Language: English   Jainism: Worship   Pneumonia due to organism     Date: 12/30/2024                           Spiritual Assessment began in [facility] 6W MED SURG        Referral/Consult From: Rounding   Encounter Overview/Reason: Initial Encounter, Attempted Encounter  Service Provided For: Patient    Lexie, Belief, Meaning:   Patient unable to assess at this time  Family/Friends No family/friends present      Importance and Influence:  Patient unable to assess at this time  Family/Friends No family/friends present    Community:  Patient feels well-supported. Support system includes: Spouse/Partner and Extended family  Family/Friends No family/friends present    Assessment and Plan of Care:     Patient Interventions include: Other: Offered prayers of the Worship lexie silently for the patient as he slept and appeared peaceful  Family/Friends Interventions include: No family/friends present    Patient Plan of Care: Spiritual Care available upon further referral  Family/Friends Plan of Care: No family/friends present    Electronically signed by Chaplain Jodi on 12/30/2024 at 3:15 PM

## 2024-12-30 NOTE — CONSULTS
10/28/2024 01:00 PM    BACTERIA 3+ 05/20/2024 01:00 PM    BACTERIA FEW 03/04/2020 12:00 PM    CLARITYU Clear 11/08/2023 12:00 AM    SPECGRAV 1.015 12/09/2021 11:20 AM    LEUKOCYTESUR NEGATIVE 12/28/2024 04:24 PM    UROBILINOGEN 0.2 12/28/2024 04:24 PM    BILIRUBINUR NEGATIVE 12/28/2024 04:24 PM    BLOODU Positive 11/08/2023 12:00 AM    GLUCOSEU NEGATIVE 12/28/2024 04:24 PM     Microalbumen/Creatinine ratio:  No components found for: \"RUCREAT\"  Iron Saturation:  No components found for: \"PERCENTFE\"  TIBC:    Lab Results   Component Value Date/Time    TIBC 318 12/21/2022 11:33 AM     FERRITIN:    Lab Results   Component Value Date/Time    FERRITIN 85 12/21/2022 11:33 AM        Imaging:  XR CHEST PORTABLE   Final Result   Cardiomegaly with minimal central congestion. Slight improved aeration at the   left lung base may be positional.         CT Head W/O Contrast   Final Result   No acute intracranial abnormality.  Follow-up evaluation is recommended         CT ABDOMEN PELVIS WO CONTRAST Additional Contrast? None   Final Result   1. No acute intra-abdominal or pelvic process.         CT CHEST WO CONTRAST   Final Result   1. Predominately left lower lung nodular and ground-glass opacities   concerning for pneumonia. Recommend follow-up to resolution.   2. Coronary artery atherosclerotic disease.         XR CHEST PORTABLE   Final Result   1. No acute cardiopulmonary process.   2. Stable cardiac silhouette enlargement.             Assessment  Acute kidney injury, multifactorial though urinary obstruction seems to be the principal etiology, the prerenal azotemia due to poor intake in the setting of severe anorexia exacerbated.  With Allison catheter placement, and IV fluid resuscitation, BUN and creatinine are improving briskly, urine output is quite good.  Baseline creatinine 1 to 1.1 mg/dL.  CT scan demonstrates no anatomic abnormalities of his kidneys or bladder.  Urinary indices (fFENa) are consistent with prerenal

## 2024-12-31 ENCOUNTER — APPOINTMENT (OUTPATIENT)
Dept: GENERAL RADIOLOGY | Age: 74
DRG: 194 | End: 2024-12-31
Payer: MEDICARE

## 2024-12-31 LAB
ANION GAP SERPL CALCULATED.3IONS-SCNC: 9 MMOL/L (ref 7–16)
BASOPHILS # BLD: 0.02 K/UL (ref 0–0.2)
BASOPHILS NFR BLD: 0 % (ref 0–2)
BILIRUB UR QL STRIP: NEGATIVE
BUN SERPL-MCNC: 48 MG/DL (ref 6–23)
CALCIUM SERPL-MCNC: 9 MG/DL (ref 8.6–10.2)
CHLORIDE SERPL-SCNC: 102 MMOL/L (ref 98–107)
CLARITY UR: CLEAR
CO2 SERPL-SCNC: 24 MMOL/L (ref 22–29)
COLOR UR: YELLOW
CREAT SERPL-MCNC: 1.4 MG/DL (ref 0.7–1.2)
EOSINOPHIL # BLD: 0.05 K/UL (ref 0.05–0.5)
EOSINOPHILS RELATIVE PERCENT: 1 % (ref 0–6)
EPI CELLS #/AREA URNS HPF: ABNORMAL /HPF
ERYTHROCYTE [DISTWIDTH] IN BLOOD BY AUTOMATED COUNT: 14.6 % (ref 11.5–15)
GFR, ESTIMATED: 55 ML/MIN/1.73M2
GLUCOSE SERPL-MCNC: 106 MG/DL (ref 74–99)
GLUCOSE UR STRIP-MCNC: >=1000 MG/DL
HBA1C MFR BLD: 5.6 % (ref 4–5.6)
HCT VFR BLD AUTO: 32.6 % (ref 37–54)
HGB BLD-MCNC: 12 G/DL (ref 12.5–16.5)
HGB UR QL STRIP.AUTO: ABNORMAL
IMM GRANULOCYTES # BLD AUTO: 0.05 K/UL (ref 0–0.58)
IMM GRANULOCYTES NFR BLD: 1 % (ref 0–5)
KETONES UR STRIP-MCNC: NEGATIVE MG/DL
LEUKOCYTE ESTERASE UR QL STRIP: NEGATIVE
LYMPHOCYTES NFR BLD: 0.97 K/UL (ref 1.5–4)
LYMPHOCYTES RELATIVE PERCENT: 9 % (ref 20–42)
MAGNESIUM SERPL-MCNC: 2.3 MG/DL (ref 1.6–2.6)
MCH RBC QN AUTO: 29.6 PG (ref 26–35)
MCHC RBC AUTO-ENTMCNC: 36.8 G/DL (ref 32–34.5)
MCV RBC AUTO: 80.5 FL (ref 80–99.9)
MONOCYTES NFR BLD: 0.69 K/UL (ref 0.1–0.95)
MONOCYTES NFR BLD: 6 % (ref 2–12)
NEUTROPHILS NFR BLD: 84 % (ref 43–80)
NEUTS SEG NFR BLD: 9.3 K/UL (ref 1.8–7.3)
NITRITE UR QL STRIP: NEGATIVE
PH UR STRIP: 5.5 [PH] (ref 5–9)
PLATELET # BLD AUTO: 145 K/UL (ref 130–450)
PMV BLD AUTO: 11.4 FL (ref 7–12)
POTASSIUM SERPL-SCNC: 3.9 MMOL/L (ref 3.5–5)
PROT UR STRIP-MCNC: 30 MG/DL
RBC # BLD AUTO: 4.05 M/UL (ref 3.8–5.8)
RBC # BLD: ABNORMAL 10*6/UL
RBC #/AREA URNS HPF: ABNORMAL /HPF
SODIUM SERPL-SCNC: 135 MMOL/L (ref 132–146)
SP GR UR STRIP: 1.02 (ref 1–1.03)
UROBILINOGEN UR STRIP-ACNC: 0.2 EU/DL (ref 0–1)
WBC #/AREA URNS HPF: ABNORMAL /HPF
WBC OTHER # BLD: 11.1 K/UL (ref 4.5–11.5)

## 2024-12-31 PROCEDURE — 97165 OT EVAL LOW COMPLEX 30 MIN: CPT

## 2024-12-31 PROCEDURE — 71045 X-RAY EXAM CHEST 1 VIEW: CPT

## 2024-12-31 PROCEDURE — 2500000003 HC RX 250 WO HCPCS: Performed by: STUDENT IN AN ORGANIZED HEALTH CARE EDUCATION/TRAINING PROGRAM

## 2024-12-31 PROCEDURE — 2060000000 HC ICU INTERMEDIATE R&B

## 2024-12-31 PROCEDURE — 81001 URINALYSIS AUTO W/SCOPE: CPT

## 2024-12-31 PROCEDURE — 6370000000 HC RX 637 (ALT 250 FOR IP)

## 2024-12-31 PROCEDURE — 94660 CPAP INITIATION&MGMT: CPT

## 2024-12-31 PROCEDURE — 36415 COLL VENOUS BLD VENIPUNCTURE: CPT

## 2024-12-31 PROCEDURE — 94640 AIRWAY INHALATION TREATMENT: CPT

## 2024-12-31 PROCEDURE — 6370000000 HC RX 637 (ALT 250 FOR IP): Performed by: STUDENT IN AN ORGANIZED HEALTH CARE EDUCATION/TRAINING PROGRAM

## 2024-12-31 PROCEDURE — 83735 ASSAY OF MAGNESIUM: CPT

## 2024-12-31 PROCEDURE — 2700000000 HC OXYGEN THERAPY PER DAY

## 2024-12-31 PROCEDURE — 85025 COMPLETE CBC W/AUTO DIFF WBC: CPT

## 2024-12-31 PROCEDURE — 83036 HEMOGLOBIN GLYCOSYLATED A1C: CPT

## 2024-12-31 PROCEDURE — 80048 BASIC METABOLIC PNL TOTAL CA: CPT

## 2024-12-31 PROCEDURE — 6360000002 HC RX W HCPCS: Performed by: STUDENT IN AN ORGANIZED HEALTH CARE EDUCATION/TRAINING PROGRAM

## 2024-12-31 PROCEDURE — 99232 SBSQ HOSP IP/OBS MODERATE 35: CPT | Performed by: FAMILY MEDICINE

## 2024-12-31 RX ORDER — CEFDINIR 300 MG/1
300 CAPSULE ORAL EVERY 12 HOURS SCHEDULED
Status: DISCONTINUED | OUTPATIENT
Start: 2024-12-31 | End: 2025-01-01 | Stop reason: HOSPADM

## 2024-12-31 RX ADMIN — CEFDINIR 300 MG: 300 CAPSULE ORAL at 16:16

## 2024-12-31 RX ADMIN — PANTOPRAZOLE SODIUM 40 MG: 40 TABLET, DELAYED RELEASE ORAL at 06:01

## 2024-12-31 RX ADMIN — CEFDINIR 300 MG: 300 CAPSULE ORAL at 20:13

## 2024-12-31 RX ADMIN — BUSPIRONE HYDROCHLORIDE 10 MG: 10 TABLET ORAL at 08:23

## 2024-12-31 RX ADMIN — BUSPIRONE HYDROCHLORIDE 10 MG: 10 TABLET ORAL at 20:13

## 2024-12-31 RX ADMIN — SODIUM CHLORIDE, PRESERVATIVE FREE 10 ML: 5 INJECTION INTRAVENOUS at 20:17

## 2024-12-31 RX ADMIN — ASPIRIN 81 MG CHEWABLE TABLET 81 MG: 81 TABLET CHEWABLE at 08:23

## 2024-12-31 RX ADMIN — FINASTERIDE 5 MG: 5 TABLET, FILM COATED ORAL at 20:13

## 2024-12-31 RX ADMIN — DOXYCYCLINE HYCLATE 100 MG: 100 CAPSULE ORAL at 20:13

## 2024-12-31 RX ADMIN — METOPROLOL SUCCINATE 100 MG: 100 TABLET, FILM COATED, EXTENDED RELEASE ORAL at 08:23

## 2024-12-31 RX ADMIN — DOXYCYCLINE HYCLATE 100 MG: 100 CAPSULE ORAL at 08:23

## 2024-12-31 RX ADMIN — EMPAGLIFLOZIN 10 MG: 10 TABLET, FILM COATED ORAL at 08:23

## 2024-12-31 RX ADMIN — IPRATROPIUM BROMIDE AND ALBUTEROL SULFATE 1 DOSE: .5; 2.5 SOLUTION RESPIRATORY (INHALATION) at 07:57

## 2024-12-31 RX ADMIN — IPRATROPIUM BROMIDE AND ALBUTEROL SULFATE 1 DOSE: .5; 2.5 SOLUTION RESPIRATORY (INHALATION) at 11:57

## 2024-12-31 RX ADMIN — BUSPIRONE HYDROCHLORIDE 10 MG: 10 TABLET ORAL at 16:16

## 2024-12-31 RX ADMIN — TAMSULOSIN HYDROCHLORIDE 0.4 MG: 0.4 CAPSULE ORAL at 20:13

## 2024-12-31 RX ADMIN — ATORVASTATIN CALCIUM 20 MG: 20 TABLET, FILM COATED ORAL at 08:23

## 2024-12-31 RX ADMIN — AMIODARONE HYDROCHLORIDE 200 MG: 200 TABLET ORAL at 08:23

## 2024-12-31 RX ADMIN — PROCHLORPERAZINE EDISYLATE 10 MG: 5 INJECTION INTRAMUSCULAR; INTRAVENOUS at 04:38

## 2024-12-31 RX ADMIN — IPRATROPIUM BROMIDE AND ALBUTEROL SULFATE 1 DOSE: .5; 2.5 SOLUTION RESPIRATORY (INHALATION) at 15:52

## 2024-12-31 RX ADMIN — FOLIC ACID 1 MG: 1 TABLET ORAL at 08:23

## 2024-12-31 RX ADMIN — IPRATROPIUM BROMIDE AND ALBUTEROL SULFATE 1 DOSE: .5; 2.5 SOLUTION RESPIRATORY (INHALATION) at 19:57

## 2024-12-31 RX ADMIN — ENOXAPARIN SODIUM 30 MG: 100 INJECTION SUBCUTANEOUS at 20:13

## 2024-12-31 RX ADMIN — DICLOFENAC SODIUM 4 G: 10 GEL TOPICAL at 20:13

## 2024-12-31 RX ADMIN — METOPROLOL SUCCINATE 100 MG: 100 TABLET, FILM COATED, EXTENDED RELEASE ORAL at 20:13

## 2024-12-31 ASSESSMENT — PAIN SCALES - GENERAL
PAINLEVEL_OUTOF10: 7
PAINLEVEL_OUTOF10: 0

## 2024-12-31 NOTE — PROGRESS NOTES
Received [employer] that daughter-in-law Poornima wants to talk regarding A1c.  Talked with her, she mentioned that the patient has multiple episodes of hyperglycemia whenever he was in the hospital last year.  His last A1c was checked in May  which was 6.0.  Patient is on Jardiance which is treating his heart failure at this moment.  Educated about dietary modifications, we will add A1c in the morning labs to see the trend.  Patient and family agrees with the plan.

## 2024-12-31 NOTE — PLAN OF CARE
Problem: Safety - Adult  Goal: Free from fall injury  12/30/2024 2142 by Vanda Harden RN  Outcome: Progressing  12/30/2024 1316 by Jocelyn Su RN  Outcome: Progressing     Problem: Chronic Conditions and Co-morbidities  Goal: Patient's chronic conditions and co-morbidity symptoms are monitored and maintained or improved  12/30/2024 2142 by Vanda Harden RN  Outcome: Progressing  12/30/2024 1316 by Jocelyn Su RN  Outcome: Progressing     Problem: Discharge Planning  Goal: Discharge to home or other facility with appropriate resources  12/30/2024 2142 by Vanda Harden RN  Outcome: Progressing  12/30/2024 1316 by Jocelyn Su RN  Outcome: Progressing     Problem: Pain  Goal: Verbalizes/displays adequate comfort level or baseline comfort level  12/30/2024 2142 by Vanda Harden RN  Outcome: Progressing  12/30/2024 1316 by Jocelyn Su RN  Outcome: Progressing     Problem: ABCDS Injury Assessment  Goal: Absence of physical injury  12/30/2024 2142 by Vanda Harden RN  Outcome: Progressing  12/30/2024 1316 by Jocelyn Su RN  Outcome: Progressing

## 2024-12-31 NOTE — PROGRESS NOTES
[facility]  Progress Note    Chief complaint :  Chief Complaint   Patient presents with    Dysuria    Urinary Frequency    Altered Mental Status       Subjective:    No acute events overnight.  This morning patient seen awake in bed, did seem a bit fatigued however had just been awoken.  Perhaps slightly confused but upon further questioning patient was awake and oriented x 3.  Discussed patient's labs improving with his baseline creatinine at 1 and today's creatinine 1.4 we are approaching normality, discussed switching from IV antibiotics to oral antibiotics today with potential discharge home however likely will keep him another 24 hours due to continuing borderline blood pressures and slightly increased white blood cell count from prior despite antibiotic treatment.    Past medical, surgical, family and social history were reviewed, non-contributory, and unchanged unless otherwise stated.    Review of Systems   All other systems reviewed and are negative.      Objective:  /81   Pulse 80   Temp 98.1 °F (36.7 °C) (Oral)   Resp 16   Ht 1.803 m (5' 11\")   Wt 104.9 kg (231 lb 4.8 oz)   SpO2 90% Comment: sleeping  BMI 32.26 kg/m²     Physical Exam  Constitutional:       General: He is not in acute distress.     Appearance: He is not ill-appearing, toxic-appearing or diaphoretic.      Comments: 2 L nasal cannula oxygen   HENT:      Head: Atraumatic.   Eyes:      General: No scleral icterus.     Extraocular Movements: Extraocular movements intact.      Conjunctiva/sclera: Conjunctivae normal.   Cardiovascular:      Rate and Rhythm: Normal rate and regular rhythm.      Heart sounds: No murmur heard.  Pulmonary:      Effort: No respiratory distress.      Breath sounds: No wheezing, rhonchi or rales.   Abdominal:      General: There is no distension.      Palpations: Abdomen is soft.      Tenderness: There is no abdominal tenderness. There is no guarding.   Musculoskeletal:

## 2024-12-31 NOTE — PROGRESS NOTES
Occupational Therapy  OCCUPATIONAL THERAPY INITIAL EVALUATION    [facility]   [address], [place]         Date:2024                                                  Patient Name: Bret Beltrán    MRN: [id number]    : 1950    Room: [address] [place] [address]      Evaluating OT: Niru Guzman OTR/L [id number]       Referring Provider:Nasir Daniel DO     Specific Provider Orders/Date: OT eval and treat 24      Diagnosis: Pneumonia  and EDD    Surgery: none      Pertinent Medical History: CVA,CHF,HTN, Pacemaker,CAD, Chronic Back Pain            Precautions:  Fall Risk, O2 , alarms    Assessment of current deficits    [x] Functional mobility  [x]ADLs  [x] Strength               [x]Cognition    [x] Functional transfers   [x] IADLs         [x] Safety Awareness   [x]Endurance    [] Fine Coordination              [x] Balance      [] Vision/perception   []Sensation     []Gross Motor Coordination  [] ROM  [] Delirium                   [] Motor Control     OT PLAN OF CARE   OT POC based on physician orders, patient diagnosis and results of clinical assessment    Frequency/Duration 1-3 days/wk for 2 weeks PRN   Specific OT Treatment Interventions to include:   * Instruction/training on adapted ADL techniques and AE recommendations to increase functional independence within precautions       * Training on energy conservation strategies, correct breathing pattern and techniques to improve independence/tolerance for self-care routine  * Functional transfer/mobility training/DME recommendations for increased independence, safety, and fall prevention  * Patient/Family education to increase follow through with safety techniques and functional independence  * Recommendation of environmental modifications for increased safety with functional transfers/mobility and ADLs  * Therapeutic exercise to improve motor endurance, ROM, and functional strength for ADLs/functional  transfers  * Therapeutic activities to facilitate/challenge dynamic balance, stand tolerance for increased safety and independence with ADLs  * Positioning to improve skin integrity, interaction with environment and functional independence  * Delirium prevention/treatment      Recommended Adaptive Equipment:  TBD     Home Living: Pt lives with wife in a 2 story home with level entry. Bed and bath on 1st floor    Bathroom setup: tub/shower    Equipment owned: ww, SPC and shower chair     Prior Level of Function: pt states that he was independent  with ADLs , independent  with IADLs; ambulated with a SPC   Driving: pt states that he drives   Occupation: pt is retired     Pain Level: none reported   Cognition: A&O: 3/4; Follows multi  step directions.Pt with some mild confusion and was asking why he was here.Pt unable to recall why he was admitted to the hospital.   Memory:  fair     Sequencing:  good    Problem solving:  good    Judgement/safety:  fair +     Functional Assessment:  AM-PAC Daily Activity Raw Score: 18/24   Initial Eval Status  Date: 12/31 /24 Treatment Status  Date: STGs = LTGs  Time frame: 10-14 days   Feeding Independent      Grooming Supervision   Independent    UB Dressing Supervision   Independent    LB Dressing Minimal Assist - assist to don slipper socks   Independent    Bathing N/A   Supervision    Toileting Dependent -martínez catheter   Supervision    Bed Mobility  Supine to sit: Stand by Assist   Sit to supine: Stand by Assist   Supine to sit: Independent   Sit to supine: Independent    Functional Transfers Sit to stand: Min A  Stand pivot: Min A   Modified [place] with ww   Functional Mobility Minimal Assist with ww short distance in pt's room with verbal cues for safe ww management   Modified [place] wth ww    Balance Sitting:     Static:  SBA    Dynamic:SBA   Standing: Min A     Activity Tolerance Fair +       Visual/  Perceptual Glasses: reading                         Hand

## 2024-12-31 NOTE — PROGRESS NOTES
[facility]  MD Jerald Man, MD Clarence Ely, MD Vanesa Ashby, CNP   Tala Yang, ANP  Madelyn Morales, KINGA  Progress Note    12/31/2024    SUBJECTIVE:   12/21: \"When can I get out of here?\"  Denies all complaint, though continues to have severe anorexia, eating almost nothing, and though says \"drinking all the time,\" active drinking very little and does not seem to have had any of the oral rehydration bottles prescribed.  (-) sob at rest on nasal cannula, has not try to get a better ambulate.  No cp/palp no peripheral swelling.  Allison catheter in place, rather dark urine in Allison bag    PROBLEM LIST:    Principal Problem:    Pneumonia due to organism  Active Problems:    EDD (acute kidney injury) (HCC)  Resolved Problems:    * No resolved hospital problems. *         DIET:    ORAL HYDRATION; Electrolyte Solution; 600 ml (20 oz)  ADULT DIET; Regular     MEDS (scheduled):    cefTRIAXone (ROCEPHIN) IV  2,000 mg IntraVENous Q24H    doxycycline hyclate  100 mg Oral 2 times per day    ipratropium 0.5 mg-albuterol 2.5 mg  1 Dose Inhalation Q4H WA RT    amiodarone  200 mg Oral Daily    aspirin  81 mg Oral Daily    atorvastatin  20 mg Oral Daily    busPIRone  10 mg Oral TID    diclofenac sodium  4 g Topical 4x Daily    empagliflozin  10 mg Oral Daily    finasteride  5 mg Oral Nightly    folic acid  1 mg Oral Daily    [Held by provider] hydrALAZINE  25 mg Oral TID    metoprolol succinate  100 mg Oral BID    [Held by provider] isosorbide dinitrate  20 mg Oral TID    pantoprazole  40 mg Oral QAM AC    [Held by provider] oxyBUTYnin  10 mg Oral Daily    [Held by provider] sacubitril-valsartan  1 tablet Oral BID    tamsulosin  0.4 mg Oral Nightly    sodium chloride flush  5-40 mL IntraVENous 2 times per day    enoxaparin  30 mg SubCUTAneous Daily       MEDS (infusions):   sodium chloride         MEDS (prn):  calcium carbonate, fluticasone, pregabalin, sodium chloride flush, sodium   27  --    BILITOT 0.8  --  0.7  --    ALKPHOS 56  --  49  --        No results found for: \"LABPROT\"    Assessment  Acute kidney injury, multifactorial though urinary obstruction seems to be the principal etiology, the prerenal azotemia due to poor intake in the setting of severe anorexia exacerbated.  With Allison catheter placement, and IV fluid resuscitation, BUN and creatinine are improving briskly, urine output is quite good.  Baseline creatinine 1 to 1.1 mg/dL.  CT scan demonstrates no anatomic abnormalities of his kidneys or bladder.  Urinary indices (fFENa) are consistent with prerenal azotemia  Anemia, evolving in part due to phlebotomy associated with his hospitalization    Azotemia continues to improve  Urine rather dark as he still is not drinking sufficiently    Recommendation  Encourage oral intake, including the oral rehydration solutions, discussed with him  Continue Allison for now, though will still need to do a voiding trial shortly, though I will leave this aspect of his care to urology  Follow labs, UO  Continue supportive care  Restricted renal standpoint, okay for discharge though I again counseled him about increasing his fluid intake and how he might do this      Electronically signed by Jerald Hartley MD on 12/31/2024 at 9:47 AM    NOTE: This report was transcribed using voice recognition software. Every effort was made to ensure accuracy; however, inadvertent transcription errors may be present

## 2025-01-01 VITALS
WEIGHT: 231.1 LBS | SYSTOLIC BLOOD PRESSURE: 152 MMHG | HEART RATE: 80 BPM | OXYGEN SATURATION: 95 % | HEIGHT: 71 IN | DIASTOLIC BLOOD PRESSURE: 107 MMHG | TEMPERATURE: 97.5 F | RESPIRATION RATE: 18 BRPM | BODY MASS INDEX: 32.35 KG/M2

## 2025-01-01 PROBLEM — N17.9 AKI (ACUTE KIDNEY INJURY) (HCC): Status: RESOLVED | Noted: 2024-12-29 | Resolved: 2025-01-01

## 2025-01-01 LAB
ANION GAP SERPL CALCULATED.3IONS-SCNC: 12 MMOL/L (ref 7–16)
BASOPHILS # BLD: 0.02 K/UL (ref 0–0.2)
BASOPHILS NFR BLD: 0 % (ref 0–2)
BUN SERPL-MCNC: 35 MG/DL (ref 6–23)
CALCIUM SERPL-MCNC: 8.9 MG/DL (ref 8.6–10.2)
CHLORIDE SERPL-SCNC: 103 MMOL/L (ref 98–107)
CO2 SERPL-SCNC: 21 MMOL/L (ref 22–29)
CREAT SERPL-MCNC: 1.1 MG/DL (ref 0.7–1.2)
EOSINOPHIL # BLD: 0.09 K/UL (ref 0.05–0.5)
EOSINOPHILS RELATIVE PERCENT: 1 % (ref 0–6)
ERYTHROCYTE [DISTWIDTH] IN BLOOD BY AUTOMATED COUNT: 14.6 % (ref 11.5–15)
GFR, ESTIMATED: 69 ML/MIN/1.73M2
GLUCOSE SERPL-MCNC: 96 MG/DL (ref 74–99)
HCT VFR BLD AUTO: 33.6 % (ref 37–54)
HGB BLD-MCNC: 12.2 G/DL (ref 12.5–16.5)
IMM GRANULOCYTES # BLD AUTO: 0.04 K/UL (ref 0–0.58)
IMM GRANULOCYTES NFR BLD: 0 % (ref 0–5)
LYMPHOCYTES NFR BLD: 1.21 K/UL (ref 1.5–4)
LYMPHOCYTES RELATIVE PERCENT: 12 % (ref 20–42)
MAGNESIUM SERPL-MCNC: 1.9 MG/DL (ref 1.6–2.6)
MCH RBC QN AUTO: 29.7 PG (ref 26–35)
MCHC RBC AUTO-ENTMCNC: 36.3 G/DL (ref 32–34.5)
MCV RBC AUTO: 81.8 FL (ref 80–99.9)
MONOCYTES NFR BLD: 0.75 K/UL (ref 0.1–0.95)
MONOCYTES NFR BLD: 7 % (ref 2–12)
NEUTROPHILS NFR BLD: 80 % (ref 43–80)
NEUTS SEG NFR BLD: 8.33 K/UL (ref 1.8–7.3)
PHOSPHATE SERPL-MCNC: 1.7 MG/DL (ref 2.5–4.5)
PLATELET CONFIRMATION: NORMAL
PLATELET, FLUORESCENCE: 152 K/UL (ref 130–450)
PMV BLD AUTO: 11.7 FL (ref 7–12)
POTASSIUM SERPL-SCNC: 4.2 MMOL/L (ref 3.5–5)
RBC # BLD AUTO: 4.11 M/UL (ref 3.8–5.8)
SODIUM SERPL-SCNC: 136 MMOL/L (ref 132–146)
WBC OTHER # BLD: 10.4 K/UL (ref 4.5–11.5)

## 2025-01-01 PROCEDURE — 80048 BASIC METABOLIC PNL TOTAL CA: CPT

## 2025-01-01 PROCEDURE — 99238 HOSP IP/OBS DSCHRG MGMT 30/<: CPT | Performed by: FAMILY MEDICINE

## 2025-01-01 PROCEDURE — 94660 CPAP INITIATION&MGMT: CPT

## 2025-01-01 PROCEDURE — 6370000000 HC RX 637 (ALT 250 FOR IP)

## 2025-01-01 PROCEDURE — 84100 ASSAY OF PHOSPHORUS: CPT

## 2025-01-01 PROCEDURE — 2500000003 HC RX 250 WO HCPCS: Performed by: STUDENT IN AN ORGANIZED HEALTH CARE EDUCATION/TRAINING PROGRAM

## 2025-01-01 PROCEDURE — 2580000003 HC RX 258: Performed by: INTERNAL MEDICINE

## 2025-01-01 PROCEDURE — 6370000000 HC RX 637 (ALT 250 FOR IP): Performed by: STUDENT IN AN ORGANIZED HEALTH CARE EDUCATION/TRAINING PROGRAM

## 2025-01-01 PROCEDURE — 94640 AIRWAY INHALATION TREATMENT: CPT

## 2025-01-01 PROCEDURE — 83735 ASSAY OF MAGNESIUM: CPT

## 2025-01-01 PROCEDURE — 2500000003 HC RX 250 WO HCPCS: Performed by: INTERNAL MEDICINE

## 2025-01-01 PROCEDURE — 85025 COMPLETE CBC W/AUTO DIFF WBC: CPT

## 2025-01-01 PROCEDURE — 6360000002 HC RX W HCPCS: Performed by: STUDENT IN AN ORGANIZED HEALTH CARE EDUCATION/TRAINING PROGRAM

## 2025-01-01 RX ORDER — CEFDINIR 300 MG/1
300 CAPSULE ORAL EVERY 12 HOURS SCHEDULED
Qty: 11 CAPSULE | Refills: 0 | Status: SHIPPED | OUTPATIENT
Start: 2025-01-01 | End: 2025-01-07

## 2025-01-01 RX ORDER — DOXYCYCLINE 100 MG/1
100 CAPSULE ORAL EVERY 12 HOURS SCHEDULED
Qty: 7 CAPSULE | Refills: 0 | Status: SHIPPED | OUTPATIENT
Start: 2025-01-01 | End: 2025-01-05

## 2025-01-01 RX ADMIN — ASPIRIN 81 MG CHEWABLE TABLET 81 MG: 81 TABLET CHEWABLE at 09:18

## 2025-01-01 RX ADMIN — SACUBITRIL AND VALSARTAN 1 TABLET: 24; 26 TABLET, FILM COATED ORAL at 09:18

## 2025-01-01 RX ADMIN — DOXYCYCLINE HYCLATE 100 MG: 100 CAPSULE ORAL at 09:19

## 2025-01-01 RX ADMIN — PANTOPRAZOLE SODIUM 40 MG: 40 TABLET, DELAYED RELEASE ORAL at 06:37

## 2025-01-01 RX ADMIN — BUSPIRONE HYDROCHLORIDE 10 MG: 10 TABLET ORAL at 14:40

## 2025-01-01 RX ADMIN — PROCHLORPERAZINE EDISYLATE 10 MG: 5 INJECTION INTRAMUSCULAR; INTRAVENOUS at 10:24

## 2025-01-01 RX ADMIN — EMPAGLIFLOZIN 10 MG: 10 TABLET, FILM COATED ORAL at 09:19

## 2025-01-01 RX ADMIN — IPRATROPIUM BROMIDE AND ALBUTEROL SULFATE 1 DOSE: .5; 2.5 SOLUTION RESPIRATORY (INHALATION) at 08:13

## 2025-01-01 RX ADMIN — ATORVASTATIN CALCIUM 20 MG: 20 TABLET, FILM COATED ORAL at 09:19

## 2025-01-01 RX ADMIN — SODIUM CHLORIDE, PRESERVATIVE FREE 10 ML: 5 INJECTION INTRAVENOUS at 09:20

## 2025-01-01 RX ADMIN — METOPROLOL SUCCINATE 100 MG: 100 TABLET, FILM COATED, EXTENDED RELEASE ORAL at 09:19

## 2025-01-01 RX ADMIN — BUSPIRONE HYDROCHLORIDE 10 MG: 10 TABLET ORAL at 09:18

## 2025-01-01 RX ADMIN — FOLIC ACID 1 MG: 1 TABLET ORAL at 09:18

## 2025-01-01 RX ADMIN — PROCHLORPERAZINE EDISYLATE 10 MG: 5 INJECTION INTRAMUSCULAR; INTRAVENOUS at 03:04

## 2025-01-01 RX ADMIN — CEFDINIR 300 MG: 300 CAPSULE ORAL at 09:18

## 2025-01-01 RX ADMIN — POTASSIUM PHOSPHATE, MONOBASIC AND POTASSIUM PHOSPHATE, DIBASIC 30 MMOL: 224; 236 INJECTION, SOLUTION, CONCENTRATE INTRAVENOUS at 10:29

## 2025-01-01 RX ADMIN — AMIODARONE HYDROCHLORIDE 200 MG: 200 TABLET ORAL at 09:18

## 2025-01-01 NOTE — PLAN OF CARE
Problem: Safety - Adult  Goal: Free from fall injury  1/1/2025 1016 by Jocelyn Su RN  Outcome: Progressing  12/31/2024 2101 by Sofiya Bhat RN  Outcome: Progressing  Flowsheets (Taken 12/31/2024 0820 by Radha Olson, RN)  Free From Fall Injury: Instruct family/caregiver on patient safety     Problem: Chronic Conditions and Co-morbidities  Goal: Patient's chronic conditions and co-morbidity symptoms are monitored and maintained or improved  1/1/2025 1016 by Jocelyn Su RN  Outcome: Progressing  12/31/2024 2101 by Sofiya Bhat RN  Outcome: Progressing     Problem: Discharge Planning  Goal: Discharge to home or other facility with appropriate resources  1/1/2025 1016 by Jocelyn Su RN  Outcome: Progressing  12/31/2024 2101 by Sofiya Bhat RN  Outcome: Progressing     Problem: Pain  Goal: Verbalizes/displays adequate comfort level or baseline comfort level  1/1/2025 1016 by Jocelyn Su RN  Outcome: Progressing  12/31/2024 2101 by Sofiya Bhat RN  Outcome: Progressing     Problem: ABCDS Injury Assessment  Goal: Absence of physical injury  1/1/2025 1016 by Jocelyn Su RN  Outcome: Progressing  12/31/2024 2101 by Sofiya Bhat RN  Outcome: Progressing  Flowsheets (Taken 12/31/2024 0820 by Radha Olson, RN)  Absence of Physical Injury: Implement safety measures based on patient assessment

## 2025-01-01 NOTE — PROGRESS NOTES
Call to [place]  to let them know patient is coming home today.     Electronically signed by Tequila Yu RN on 1/1/2025 at 5:27 PM

## 2025-01-01 NOTE — DISCHARGE SUMMARY
Physician Discharge Summary  [facility]     Patient ID:  Bret Beltrán  [id number]  74 y.o.  1950    Admit date: 12/28/2024    Discharge date: 1/1/2025    Admission Diagnoses:   Pneumonia due to organism [J18.9]  EDD (acute kidney injury) (HCC) [N17.9]  Pneumonia of left lower lobe due to infectious organism [J18.9]  Other fatigue [R53.83]    Discharge Diagnoses:  Principal Problem:    Pneumonia due to organism  Resolved Problems:    EDD (acute kidney injury) (HCC)      Consults: nephrology and urology   Procedures: None    Hospital Course: 74-year-old male Mariano Garcia MD  with a pertinent PMHx of CAD W/3 stents X1 0.5 years.  PTSD, depression, pacemaker, HTN, systolic HF, thrombocytopenia who presented to the ER from home with chief complaint of weakness and was admitted for EDD and pneumonia.    In the emergency department patient was afebrile, RR 20, HR 81, BP 97/64, 95% on room air.  CMP notable for creatinine 3.6 and BUN 70, baseline creatinine 1.0.  Chest x-ray stable cardiac enlargement and CT chest demonstrating groundglass opacity in the left lower lobe.  Patient was started on IV fluids and need for nephrology consult was needed with complete resolution of EDD prior to discharge.  Patient was also started on IV antibiotics and switch to oral antibiotics prior to discharge; patient will continue antibiotic treatment with a completion of 7 days of treatment with cefdinir 300 mg twice daily and doxycycline 100 mg twice daily.    During hospitalization hypertension medication was hold in view of low blood pressure, prior to discharge blood pressure improved and Entresto was started prior to discharge with appropriate response, hydralazine and Isordil remained held after discharge to be started as able by PCP.  Also during hospitalization patient had episode of urinary incontinence, oxybutynin was hold and urology was consulted who recommended Allison placement, discharged

## 2025-01-01 NOTE — PROGRESS NOTES
[facility]  Progress Note    Chief complaint :  Chief Complaint   Patient presents with    Dysuria    Urinary Frequency    Altered Mental Status       Subjective:    No acute events overnight.  Patient refers feeling better, denies any headache, nausea, vomiting, chest pain, shortness of breath, palpitations, changes in bowel movement.  Patient is tolerating diet and would like to go home.     Past medical, surgical, family and social history were reviewed, non-contributory, and unchanged unless otherwise stated.    Review of Systems   All other systems reviewed and are negative.      Objective:  BP (!) 158/92   Pulse 81   Temp 98.4 °F (36.9 °C) (Oral)   Resp 16   Ht 1.803 m (5' 11\")   Wt 104.9 kg (231 lb 4.8 oz)   SpO2 94%   BMI 32.26 kg/m²     Physical Exam  Constitutional:       General: He is not in acute distress.     Appearance: He is not ill-appearing, toxic-appearing or diaphoretic.   HENT:      Head: Atraumatic.   Eyes:      General: No scleral icterus.     Extraocular Movements: Extraocular movements intact.      Conjunctiva/sclera: Conjunctivae normal.   Cardiovascular:      Rate and Rhythm: Normal rate and regular rhythm.      Heart sounds: No murmur heard.  Pulmonary:      Effort: No respiratory distress.      Breath sounds: No wheezing, rhonchi or rales.   Abdominal:      General: There is no distension.      Palpations: Abdomen is soft.      Tenderness: There is no abdominal tenderness. There is no guarding.   Musculoskeletal:      Right lower leg: No edema.      Left lower leg: No edema.   Skin:     General: Skin is warm and dry.   Neurological:      Mental Status: He is alert. Mental status is at baseline.   Psychiatric:         Mood and Affect: Mood normal.         Labs:  Recent Results (from the past 24 hour(s))   Urinalysis with Microscopic    Collection Time: 12/31/24 12:15 AM   Result Value Ref Range    Color, UA Yellow Yellow    Turbidity UA Clear  Morphology 2+ POIKILOCYTOSIS     RBC Morphology 1+ POLYCHROMASIA     RBC Morphology 2+ TARGET CELLS    Hemoglobin A1C    Collection Time: 12/31/24  4:29 AM   Result Value Ref Range    Hemoglobin A1C 5.6 4.0 - 5.6 %       Radiology and other tests reviewed:  XR CHEST PORTABLE   Final Result   Stable cardiomegaly.  Small left effusion.         XR CHEST PORTABLE   Final Result   Cardiomegaly with minimal central congestion. Slight improved aeration at the   left lung base may be positional.         CT Head W/O Contrast   Final Result   No acute intracranial abnormality.  Follow-up evaluation is recommended         CT ABDOMEN PELVIS WO CONTRAST Additional Contrast? None   Final Result   1. No acute intra-abdominal or pelvic process.         CT CHEST WO CONTRAST   Final Result   1. Predominately left lower lung nodular and ground-glass opacities   concerning for pneumonia. Recommend follow-up to resolution.   2. Coronary artery atherosclerotic disease.         XR CHEST PORTABLE   Final Result   1. No acute cardiopulmonary process.   2. Stable cardiac silhouette enlargement.             Assessment:  Active Hospital Problems    Diagnosis Date Noted    EDD (acute kidney injury) ([facility]) [N17.9] 12/29/2024    Pneumonia due to organism [J18.9] 12/28/2024       Plan:  Left lower lobe pneumonia  CT chest demonstrating groundglass opacity in left lower lobe.  -Supplemental oxygen as needed  -Bcx, Urine Cx negative   - Omnicef and doxycycline 4/7 days of therapy  -Duonebs Q4H while awake  -Flonase twice daily     EDD, improving  Baseline creatinine 1.0.  Creatinine 3.6 on presentation.  Patient hypotensive and clinically dry on exam.  BNP mild elevation of 274.  Last estimated EF 69%  -FeNa 0.3% indicating prerenal  -Urine sodium 29  -Urine creatinine 234  -s/p IVF  -P.o. oral hydration orders  -Creatinine 3.6 > 3.2> 1.8> 1.4> today 1.1  -Nephrology following, appreciate recs   -stop IVF, continue martínez per urology, will need

## 2025-01-01 NOTE — PLAN OF CARE
Problem: Safety - Adult  Goal: Free from fall injury  Outcome: Progressing  Flowsheets (Taken 12/31/2024 0820 by Radha Olson, RN)  Free From Fall Injury: Instruct family/caregiver on patient safety     Problem: Chronic Conditions and Co-morbidities  Goal: Patient's chronic conditions and co-morbidity symptoms are monitored and maintained or improved  Outcome: Progressing     Problem: Discharge Planning  Goal: Discharge to home or other facility with appropriate resources  Outcome: Progressing     Problem: Pain  Goal: Verbalizes/displays adequate comfort level or baseline comfort level  Outcome: Progressing     Problem: ABCDS Injury Assessment  Goal: Absence of physical injury  Outcome: Progressing  Flowsheets (Taken 12/31/2024 0820 by Radha Olson, RN)  Absence of Physical Injury: Implement safety measures based on patient assessment

## 2025-01-01 NOTE — PROGRESS NOTES
[facility]  MD Jerald Man, MD Vanesa Whitfield, CNP   Tala Yang, SHAWN Morales, KINGA  Progress Note    1/1/2025    SUBJECTIVE:   12/31: \"When can I get out of here?\"  Denies all complaint, though continues to have severe anorexia, eating almost nothing, and though says \"drinking all the time,\" active drinking very little and does not seem to have had any of the oral rehydration bottles prescribed.  (-) sob at rest on nasal cannula, has not try to get a better ambulate.  No cp/palp no peripheral swelling.  Allison catheter in place, rather dark urine in Allison bag    1/1: Seen this morning.  Asleep, easily awakened.  No new complaint or issue.  Anxious to go home.  ROS unremarkable.    PROBLEM LIST:    Principal Problem:    Pneumonia due to organism  Resolved Problems:    EDD (acute kidney injury) ([facility])         DIET:    ORAL HYDRATION; Electrolyte Solution; 600 ml (20 oz)  ADULT DIET; Regular     MEDS (scheduled):    potassium phosphate IVPB (PERIPHERAL LINE)  30 mmol IntraVENous Once    cefdinir  300 mg Oral 2 times per day    doxycycline hyclate  100 mg Oral 2 times per day    ipratropium 0.5 mg-albuterol 2.5 mg  1 Dose Inhalation Q4H WA RT    amiodarone  200 mg Oral Daily    aspirin  81 mg Oral Daily    atorvastatin  20 mg Oral Daily    busPIRone  10 mg Oral TID    diclofenac sodium  4 g Topical 4x Daily    empagliflozin  10 mg Oral Daily    finasteride  5 mg Oral Nightly    folic acid  1 mg Oral Daily    [Held by provider] hydrALAZINE  25 mg Oral TID    metoprolol succinate  100 mg Oral BID    [Held by provider] isosorbide dinitrate  20 mg Oral TID    pantoprazole  40 mg Oral QAM AC    [Held by provider] oxyBUTYnin  10 mg Oral Daily    sacubitril-valsartan  1 tablet Oral BID    tamsulosin  0.4 mg Oral Nightly    sodium chloride flush  5-40 mL IntraVENous 2 times per day    enoxaparin  30 mg SubCUTAneous Daily       MEDS (infusions):   sodium

## 2025-01-02 ENCOUNTER — CARE COORDINATION (OUTPATIENT)
Dept: CARE COORDINATION | Age: 75
End: 2025-01-02

## 2025-01-02 NOTE — CARE COORDINATION
Care Transitions Note    Initial Call - Call within 2 business days of discharge: Yes    Attempted to reach patient for transitions of care follow up. Unable to reach patient. Left message requesting call back. Contacted [facility]. Informed pt was denied by [facility] for services.Joy at [facility] stated she spoke to discharge CC Pennie Juliocesar at [facility] and informed her this morning of denial. Non [facility] providers. Will re attmept to reach tomorrow.    Outreach Attempts:   HIPAA compliant voicemail left for patient.     Patient: Bret Beltrán    Patient : 1950   MRN: [id number]    Reason for Admission: PNA LLL  Discharge Date: 25  RURS: Readmission Risk Score: 13    Last Discharge Facility       Date Complaint Diagnosis Description Type Department Provider    24 Dysuria; Urinary Frequency; Altered Mental Status EDD (acute kidney injury) (HCC) ... ED to Hosp-Admission (Discharged) (ADMITTED) JEANNETTE OrtegaW Karine Tomlin MD; Love Kilpatrick M...            Was this an external facility discharge? No    Follow Up Appointment:   Patient does not have a follow up appointment scheduled at time of call.  Non [facility] PCP  Future Appointments         Provider Specialty Dept Phone    2025 1:00 PM Dennise Bravo APRN - CNP Orthopedic Surgery [phone]    2025 1:00 PM Dennise Bravo APRN - CNP Orthopedic Surgery [phone]    2025 11:00 AM Dennise Bravo APRN - CNP Orthopedic Surgery [phone]            Plan for follow-up on next business day.      Nessa Archibald RN

## 2025-01-03 ENCOUNTER — TELEPHONE (OUTPATIENT)
Dept: FAMILY MEDICINE CLINIC | Age: 75
End: 2025-01-03

## 2025-01-03 ENCOUNTER — CARE COORDINATION (OUTPATIENT)
Dept: CARE COORDINATION | Age: 75
End: 2025-01-03

## 2025-01-03 LAB
MICROORGANISM SPEC CULT: NORMAL
MICROORGANISM SPEC CULT: NORMAL
SERVICE CMNT-IMP: NORMAL
SERVICE CMNT-IMP: NORMAL
SPECIMEN DESCRIPTION: NORMAL
SPECIMEN DESCRIPTION: NORMAL

## 2025-01-03 NOTE — CARE COORDINATION
Care Transitions Note    Initial Call - Call within 2 business days of discharge: Yes    Attempted to reach patient for transitions of care follow up. Unable to reach patient.    Outreach Attempts:   Multiple attempts to contact patient at phone numbers on file.     Patient: Bret Beltrán    Patient : 1950   MRN: [id number]    Reason for Admission: PNA  Discharge Date: 25  RURS: Readmission Risk Score: 13    Last Discharge Facility       Date Complaint Diagnosis Description Type Department Provider    24 Dysuria; Urinary Frequency; Altered Mental Status EDD (acute kidney injury) (HCC) ... ED to Hosp-Admission (Discharged) (ADMITTED) Karine Vasquez MD; Love Kilpatrick M...            Was this an external facility discharge? No    Follow Up Appointment:   Patient does not have a follow up appointment scheduled at time of call.  Unable to reach  Future Appointments         Provider Specialty Dept Phone    2025 1:00 PM Dennise Bravo APRN - CNP Orthopedic Surgery [phone]    2025 1:00 PM Dennise Bravo APRN - CNP Orthopedic Surgery [phone]    2025 11:00 AM Dennise Bravo APRN - CNP Orthopedic Surgery [phone]            No further follow-up call indicated     Nessa Archibald RN

## 2025-01-03 NOTE — TELEPHONE ENCOUNTER
I am not comfortable sending something else for nausea to the hospital without having him be evaluated first.  I have not been involved with his recent care in the hospital and I do not know the cause of the nausea.  It could be as simple as a medication side effect from the antibiotics he is on, or something more worrisome.  I am not sure if his blood pressure is too low or not.  Do not know if he is having any abdominal problems.  I do not want to mask something harmful happening by sending another nausea medicine to the pharmacy.  He could come in through [facility] for an evaluation this afternoon or Saturday morning.

## 2025-01-03 NOTE — TELEPHONE ENCOUNTER
Patients wife called to report, since being home from the hospital patient is very nausea. They have Zofran that he has been taking but this is not working. Can something else be called in? I did not see an order for this.   Pharmacy- [facility] in [place]    No suppository per wife request.  Below from hospital encounter    During hospitalization hypertension medication was hold in view of low blood pressure, prior to discharge blood pressure improved and Entresto was started prior to discharge with appropriate response, hydralazine and Isordil remained held after discharge to be started as able by PCP.     Wife also states he needs a refill for the Entresto, I dont see an order for this either.     I did schedule hospital follow up for 1/7/25     I hope I have all the information you need for this

## 2025-01-04 ENCOUNTER — OFFICE VISIT (OUTPATIENT)
Dept: FAMILY MEDICINE CLINIC | Age: 75
End: 2025-01-04
Payer: MEDICARE

## 2025-01-04 VITALS
RESPIRATION RATE: 18 BRPM | SYSTOLIC BLOOD PRESSURE: 90 MMHG | DIASTOLIC BLOOD PRESSURE: 66 MMHG | BODY MASS INDEX: 32.23 KG/M2 | HEIGHT: 71 IN | OXYGEN SATURATION: 95 % | HEART RATE: 89 BPM | TEMPERATURE: 97.1 F

## 2025-01-04 DIAGNOSIS — K52.9 ACUTE GASTROENTERITIS: Primary | ICD-10-CM

## 2025-01-04 PROCEDURE — 1123F ACP DISCUSS/DSCN MKR DOCD: CPT | Performed by: FAMILY MEDICINE

## 2025-01-04 PROCEDURE — 3074F SYST BP LT 130 MM HG: CPT | Performed by: FAMILY MEDICINE

## 2025-01-04 PROCEDURE — 96372 THER/PROPH/DIAG INJ SC/IM: CPT | Performed by: FAMILY MEDICINE

## 2025-01-04 PROCEDURE — 1159F MED LIST DOCD IN RCRD: CPT | Performed by: FAMILY MEDICINE

## 2025-01-04 PROCEDURE — 99213 OFFICE O/P EST LOW 20 MIN: CPT | Performed by: FAMILY MEDICINE

## 2025-01-04 PROCEDURE — 3078F DIAST BP <80 MM HG: CPT | Performed by: FAMILY MEDICINE

## 2025-01-04 RX ORDER — PROMETHAZINE HYDROCHLORIDE 25 MG/ML
25 INJECTION, SOLUTION INTRAMUSCULAR; INTRAVENOUS ONCE
Status: COMPLETED | OUTPATIENT
Start: 2025-01-04 | End: 2025-01-04

## 2025-01-04 RX ORDER — SACCHAROMYCES BOULARDII 250 MG
250 CAPSULE ORAL 2 TIMES DAILY
Qty: 14 CAPSULE | Refills: 0 | Status: SHIPPED | OUTPATIENT
Start: 2025-01-04 | End: 2025-01-11

## 2025-01-04 RX ADMIN — PROMETHAZINE HYDROCHLORIDE 25 MG: 25 INJECTION, SOLUTION INTRAMUSCULAR; INTRAVENOUS at 09:58

## 2025-01-04 NOTE — PROGRESS NOTES
OFFICE NOTE    25  Name: Bret Beltrán  :1950   Sex:male   Age:74 y.o.      SUBJECTIVE  Chief Complaint   Patient presents with    Generalized Body Aches     Started Wednesday     Nausea     Nausea since Monday. Was prescribed Zofran before d/c from the hospital on Wednesday but itis not controlling nausea and vomiting. States that he throws up all day. Drinking Propel and is able to keep it down sometimes. Had chicken noodle soup w/ crackers last for dinner last night.     Diarrhea     Started Friday        HPI was put on both doxycycline and Cefdinir in hospital Has about a day left, seeing Dr. Carrasquillo on Monday. Has martínez cather in, urine looks clear. Wife stopped antibiotic last night feel a little bit better today    Review of Systems     On amiodarone, jardiance, and Entresto as well as tamsulosin. Came in wheelchair seems in good spirits. VA benefits were taken away from him 10 years ago, recently restored, \"thanks to Jaems Costello\"    Current Outpatient Medications:     saccharomyces boulardii (FLORASTOR) 250 MG capsule, Take 1 capsule by mouth 2 times daily for 7 days, Disp: 14 capsule, Rfl: 0    doxycycline hyclate (VIBRAMYCIN) 100 MG capsule, Take 1 capsule by mouth every 12 hours for 7 doses, Disp: 7 capsule, Rfl: 0    cefdinir (OMNICEF) 300 MG capsule, Take 1 capsule by mouth every 12 hours for 11 doses, Disp: 11 capsule, Rfl: 0    pregabalin (LYRICA) 100 MG capsule, TAKE ONE CAPSULE BY MOUTH THREE TIMES DAILY AS NEEDED FOR PAIN, Disp: , Rfl:     folic acid (FOLVITE) 1 MG tablet, Take 1 tablet by mouth daily, Disp: 90 tablet, Rfl: 1    tamsulosin (FLOMAX) 0.4 MG capsule, Take 1 capsule by mouth at bedtime, Disp: 90 capsule, Rfl: 1    ciclopirox (LOPROX) 0.77 % cream, Apply topically 2 times daily., Disp: 30 g, Rfl: 1    albuterol sulfate HFA (VENTOLIN HFA) 108 (90 Base) MCG/ACT inhaler, Inhale 2 puffs into the lungs 4 times daily as needed for Wheezing, Disp: 18 g, Rfl: 0

## 2025-01-13 ENCOUNTER — TELEPHONE (OUTPATIENT)
Dept: FAMILY MEDICINE CLINIC | Age: 75
End: 2025-01-13

## 2025-01-13 NOTE — TELEPHONE ENCOUNTER
[facility] called to update you that patient has declined home health d/t not having a martínez catheter at this time.

## 2025-01-20 ENCOUNTER — TELEPHONE (OUTPATIENT)
Dept: ORTHOPEDIC SURGERY | Age: 75
End: 2025-01-20

## 2025-01-20 NOTE — TELEPHONE ENCOUNTER
Patient never came in for his euflexxa injections for bl knees. Injections were put back into inventory on 01/20/25

## 2025-04-01 ENCOUNTER — OFFICE VISIT (OUTPATIENT)
Dept: FAMILY MEDICINE CLINIC | Age: 75
End: 2025-04-01
Payer: MEDICARE

## 2025-04-01 VITALS
TEMPERATURE: 97.8 F | BODY MASS INDEX: 31.78 KG/M2 | HEIGHT: 71 IN | OXYGEN SATURATION: 94 % | HEART RATE: 91 BPM | WEIGHT: 227 LBS | DIASTOLIC BLOOD PRESSURE: 86 MMHG | SYSTOLIC BLOOD PRESSURE: 126 MMHG

## 2025-04-01 DIAGNOSIS — G89.29 CHRONIC PAIN OF RIGHT KNEE: ICD-10-CM

## 2025-04-01 DIAGNOSIS — M25.561 CHRONIC PAIN OF RIGHT KNEE: ICD-10-CM

## 2025-04-01 DIAGNOSIS — G89.29 CHRONIC LEFT-SIDED LOW BACK PAIN WITH LEFT-SIDED SCIATICA: Primary | ICD-10-CM

## 2025-04-01 DIAGNOSIS — M54.42 CHRONIC LEFT-SIDED LOW BACK PAIN WITH LEFT-SIDED SCIATICA: Primary | ICD-10-CM

## 2025-04-01 PROCEDURE — 3074F SYST BP LT 130 MM HG: CPT | Performed by: FAMILY MEDICINE

## 2025-04-01 PROCEDURE — 1159F MED LIST DOCD IN RCRD: CPT | Performed by: FAMILY MEDICINE

## 2025-04-01 PROCEDURE — 1123F ACP DISCUSS/DSCN MKR DOCD: CPT | Performed by: FAMILY MEDICINE

## 2025-04-01 PROCEDURE — 3079F DIAST BP 80-89 MM HG: CPT | Performed by: FAMILY MEDICINE

## 2025-04-01 PROCEDURE — 99214 OFFICE O/P EST MOD 30 MIN: CPT | Performed by: FAMILY MEDICINE

## 2025-04-01 ASSESSMENT — PATIENT HEALTH QUESTIONNAIRE - PHQ9
7. TROUBLE CONCENTRATING ON THINGS, SUCH AS READING THE NEWSPAPER OR WATCHING TELEVISION: SEVERAL DAYS
5. POOR APPETITE OR OVEREATING: SEVERAL DAYS
3. TROUBLE FALLING OR STAYING ASLEEP: NEARLY EVERY DAY
8. MOVING OR SPEAKING SO SLOWLY THAT OTHER PEOPLE COULD HAVE NOTICED. OR THE OPPOSITE, BEING SO FIGETY OR RESTLESS THAT YOU HAVE BEEN MOVING AROUND A LOT MORE THAN USUAL: SEVERAL DAYS
1. LITTLE INTEREST OR PLEASURE IN DOING THINGS: NEARLY EVERY DAY
SUM OF ALL RESPONSES TO PHQ QUESTIONS 1-9: 16
2. FEELING DOWN, DEPRESSED OR HOPELESS: NEARLY EVERY DAY
10. IF YOU CHECKED OFF ANY PROBLEMS, HOW DIFFICULT HAVE THESE PROBLEMS MADE IT FOR YOU TO DO YOUR WORK, TAKE CARE OF THINGS AT HOME, OR GET ALONG WITH OTHER PEOPLE: EXTREMELY DIFFICULT
6. FEELING BAD ABOUT YOURSELF - OR THAT YOU ARE A FAILURE OR HAVE LET YOURSELF OR YOUR FAMILY DOWN: SEVERAL DAYS
SUM OF ALL RESPONSES TO PHQ QUESTIONS 1-9: 16
4. FEELING TIRED OR HAVING LITTLE ENERGY: NEARLY EVERY DAY
SUM OF ALL RESPONSES TO PHQ QUESTIONS 1-9: 16
SUM OF ALL RESPONSES TO PHQ QUESTIONS 1-9: 16
9. THOUGHTS THAT YOU WOULD BE BETTER OFF DEAD, OR OF HURTING YOURSELF: NOT AT ALL

## 2025-04-01 NOTE — PROGRESS NOTES
[facility]  Office Progress Note - Dr. Swift  4/1/25    CC:   Chief Complaint   Patient presents with    Back Pain     Chronic lower back pain - interested in referral to a specialist        /86   Pulse 91   Temp 97.8 °F (36.6 °C) (Temporal)   Ht 1.803 m (5' 11\")   Wt 103 kg (227 lb)   SpO2 94%   BMI 31.66 kg/m²   Wt Readings from Last 3 Encounters:   04/01/25 103 kg (227 lb)   01/01/25 104.8 kg (231 lb 1.6 oz)   12/13/24 108.9 kg (240 lb)       HPI:   JAYY generated note:  History of Present Illness  The patient presents for evaluation of left low back pain and right knee pain.    Persistent discomfort in the right knee is reported, attributed to compensatory overuse following a left knee procedure. Plans to consult with Dr. Campoverde regarding the possibility of a repeat left knee replacement are mentioned.  A staph infection in the left knee was historically noted by the patient.  Another physician has informed him that the knee should not be as swollen as it currently is. An upcoming appointment on 04/14/2025 is scheduled with me, but he is seeking to expedite the process. Cortisone injections in the right knee in the past have provided temporary relief.  He is interested in trying that again for his right knee.    Significant pain in the left lower back is experienced, radiating down the left leg to the posterior aspect of the knee, accompanied by a burning sensation and numbness. This morning, difficulty getting out of bed was experienced, taking approximately 30 minutes and requiring the use of Lyrica. Frustration with the current quality of life is expressed, and further assistance is sought. A history of back surgery, including nerve ablation and pin insertion, performed by Dr. Hodges approximately 35 years ago, is noted at [facility]. Epidural injections have been undergone, providing only short-term relief. The current medication regimen includes Lyrica 100 mg

## 2025-04-02 ENCOUNTER — OFFICE VISIT (OUTPATIENT)
Dept: FAMILY MEDICINE CLINIC | Age: 75
End: 2025-04-02
Payer: COMMERCIAL

## 2025-04-02 VITALS
DIASTOLIC BLOOD PRESSURE: 84 MMHG | SYSTOLIC BLOOD PRESSURE: 130 MMHG | OXYGEN SATURATION: 98 % | WEIGHT: 230 LBS | RESPIRATION RATE: 18 BRPM | BODY MASS INDEX: 32.2 KG/M2 | TEMPERATURE: 97.4 F | HEIGHT: 71 IN | HEART RATE: 79 BPM

## 2025-04-02 DIAGNOSIS — G89.29 CHRONIC PAIN OF RIGHT KNEE: Primary | ICD-10-CM

## 2025-04-02 DIAGNOSIS — M25.561 CHRONIC PAIN OF RIGHT KNEE: Primary | ICD-10-CM

## 2025-04-02 PROCEDURE — 20610 DRAIN/INJ JOINT/BURSA W/O US: CPT | Performed by: FAMILY MEDICINE

## 2025-04-02 RX ORDER — LIDOCAINE HYDROCHLORIDE 20 MG/ML
1 INJECTION, SOLUTION EPIDURAL; INFILTRATION; INTRACAUDAL; PERINEURAL ONCE
Status: DISCONTINUED | OUTPATIENT
Start: 2025-04-02 | End: 2025-04-02

## 2025-04-02 RX ORDER — TRIAMCINOLONE ACETONIDE 40 MG/ML
40 INJECTION, SUSPENSION INTRA-ARTICULAR; INTRAMUSCULAR ONCE
Status: COMPLETED | OUTPATIENT
Start: 2025-04-02 | End: 2025-04-02

## 2025-04-02 RX ORDER — LIDOCAINE HYDROCHLORIDE 10 MG/ML
1 INJECTION, SOLUTION INFILTRATION; PERINEURAL ONCE
Status: COMPLETED | OUTPATIENT
Start: 2025-04-02 | End: 2025-04-02

## 2025-04-02 RX ORDER — LIDOCAINE 50 MG/G
1 PATCH TOPICAL DAILY
Qty: 10 PATCH | Refills: 0 | Status: SHIPPED | OUTPATIENT
Start: 2025-04-02 | End: 2025-04-12

## 2025-04-02 RX ADMIN — LIDOCAINE HYDROCHLORIDE 3 ML: 10 INJECTION, SOLUTION INFILTRATION; PERINEURAL at 16:33

## 2025-04-02 RX ADMIN — TRIAMCINOLONE ACETONIDE 40 MG: 40 INJECTION, SUSPENSION INTRA-ARTICULAR; INTRAMUSCULAR at 14:44

## 2025-04-02 NOTE — PROGRESS NOTES
[facility]  Department of Family Medicine  Family Medicine Residency Program      Patient: Bret Beltrán 75 y.o. male     Date of Service: 4/2/25      Chief complaint:   Chief Complaint   Patient presents with    Knee Pain     right - wants injection        HISTORY OF PRESENTING ILLNESS     75 y.o. male presented to the clinic with complaints of right knee pain.     Chronic Knee Pain  Going on for many years  Had an injection in September that worked for 3 months or more          Health Maintenance:  Health Maintenance Due   Topic Date Due    Hepatitis C screen  Never done    Pneumococcal 50+ years Vaccine (2 of 2 - PCV) 04/13/2020    Respiratory Syncytial Virus (RSV) Pregnant or age 60 yrs+ (1 - 1-dose 75+ series) Never done    COVID-19 Vaccine (1 - 2024-25 season) Never done    Annual Wellness Visit (Medicare Advantage)  01/01/2025    DTaP/Tdap/Td vaccine (2 - Td or Tdap) 02/06/2025     Past Medical History:      Diagnosis Date    Cerebral artery occlusion with cerebral infarction (HCC)     Chronic back pain     Chronic systolic congestive heart failure (HCC) 11/28/2023    Coronary artery disease involving native coronary artery of native heart without angina pectoris 7/5/2022    Depression     Hyperlipidemia     Hypertension     Pacemaker     Sleep apnea     TIA (transient ischemic attack)     Ventricular tachycardia (HCC)      Past Surgical History:        Procedure Laterality Date    BACK SURGERY      CARDIAC CATHETERIZATION  09/15/2020    Dr. Leyva    CARDIAC DEFIBRILLATOR PLACEMENT  9/223/20    UPGRADE PPM TO DUAL ICD    ([facility])   DR. NEFF     CARDIAC PACEMAKER PLACEMENT  2014    CHOLECYSTECTOMY  1999    EYE SURGERY Left     FOOT SURGERY      KNEE SURGERY Left 2020    Dr. Nirali [place]    VENTRICULOPERITONEAL SHUNT  1994     Allergies:    Bee venom, Naproxen, and Meloxicam  Social History:   Social History     Socioeconomic History    Marital status:      Spouse name: Not

## 2025-04-02 NOTE — PROGRESS NOTES
[facility]  [facility]    Subjective:  Presents for right knee injection  Known OA  Had injection last Sept and worked well for him for quite some time.   Would like to try again.   Informed consent obtained. Witnessed by me.   Successful injection of the right knee from an inferolateral approach.       ROS otherwise negative    Past medical, surgical, family and social history were reviewed, non-contributory, and unchanged unless otherwise stated.    Objective:    /84 (BP Site: Left Upper Arm, Patient Position: Sitting, BP Cuff Size: Large Adult)   Pulse 79   Temp 97.4 °F (36.3 °C) (Temporal)   Resp 18   Ht 1.803 m (5' 11\")   Wt 104.3 kg (230 lb)   SpO2 98%   BMI 32.08 kg/m²     Exam is as noted by resident with the following changes, additions or corrections:    General:  NAD; alert & oriented x 3   Heart:  RRR, no murmurs, gallops, or rubs.  Lungs:  CTA bilaterally, no wheeze, rales or rhonchi  Abd: bowel sounds present, nontender, nondistended, no masses  Extrem:  No clubbing, cyanosis, or edema    Assessment/Plan:    Right knee OA  Successful kenalog/lidocaine injection of the right knee.        Attending Physician Statement  I have reviewed the chart, including any radiology or labs, and have seen the patient with the resident(s).  I personally reviewed and performed key elements of the history and exam.  I agree with the assessment, plan and orders as documented by the resident.  Please refer to the resident note for additional information.      Electronically signed by RAMESH LITTLEJOHN MD on 4/2/2025 at 2:39 PM     no

## 2025-04-14 ENCOUNTER — OFFICE VISIT (OUTPATIENT)
Dept: FAMILY MEDICINE CLINIC | Age: 75
End: 2025-04-14
Payer: MEDICARE

## 2025-04-14 VITALS
HEIGHT: 71 IN | HEART RATE: 84 BPM | TEMPERATURE: 97.7 F | OXYGEN SATURATION: 94 % | WEIGHT: 233 LBS | DIASTOLIC BLOOD PRESSURE: 84 MMHG | BODY MASS INDEX: 32.62 KG/M2 | SYSTOLIC BLOOD PRESSURE: 134 MMHG

## 2025-04-14 DIAGNOSIS — G89.29 CHRONIC BILATERAL LOW BACK PAIN, UNSPECIFIED WHETHER SCIATICA PRESENT: ICD-10-CM

## 2025-04-14 DIAGNOSIS — Z00.00 MEDICARE ANNUAL WELLNESS VISIT, SUBSEQUENT: Primary | ICD-10-CM

## 2025-04-14 DIAGNOSIS — M25.562 CHRONIC PAIN OF BOTH KNEES: ICD-10-CM

## 2025-04-14 DIAGNOSIS — M25.561 CHRONIC PAIN OF BOTH KNEES: ICD-10-CM

## 2025-04-14 DIAGNOSIS — M54.50 CHRONIC BILATERAL LOW BACK PAIN, UNSPECIFIED WHETHER SCIATICA PRESENT: ICD-10-CM

## 2025-04-14 DIAGNOSIS — G89.29 CHRONIC PAIN OF BOTH KNEES: ICD-10-CM

## 2025-04-14 PROCEDURE — G0439 PPPS, SUBSEQ VISIT: HCPCS | Performed by: FAMILY MEDICINE

## 2025-04-14 PROCEDURE — 3079F DIAST BP 80-89 MM HG: CPT | Performed by: FAMILY MEDICINE

## 2025-04-14 PROCEDURE — 3075F SYST BP GE 130 - 139MM HG: CPT | Performed by: FAMILY MEDICINE

## 2025-04-14 PROCEDURE — 1159F MED LIST DOCD IN RCRD: CPT | Performed by: FAMILY MEDICINE

## 2025-04-14 PROCEDURE — 1123F ACP DISCUSS/DSCN MKR DOCD: CPT | Performed by: FAMILY MEDICINE

## 2025-04-14 ASSESSMENT — PATIENT HEALTH QUESTIONNAIRE - PHQ9
SUM OF ALL RESPONSES TO PHQ QUESTIONS 1-9: 11
6. FEELING BAD ABOUT YOURSELF - OR THAT YOU ARE A FAILURE OR HAVE LET YOURSELF OR YOUR FAMILY DOWN: SEVERAL DAYS
1. LITTLE INTEREST OR PLEASURE IN DOING THINGS: NEARLY EVERY DAY
4. FEELING TIRED OR HAVING LITTLE ENERGY: MORE THAN HALF THE DAYS
SUM OF ALL RESPONSES TO PHQ QUESTIONS 1-9: 11
SUM OF ALL RESPONSES TO PHQ QUESTIONS 1-9: 11
3. TROUBLE FALLING OR STAYING ASLEEP: MORE THAN HALF THE DAYS
2. FEELING DOWN, DEPRESSED OR HOPELESS: SEVERAL DAYS
5. POOR APPETITE OR OVEREATING: NOT AT ALL
7. TROUBLE CONCENTRATING ON THINGS, SUCH AS READING THE NEWSPAPER OR WATCHING TELEVISION: SEVERAL DAYS
9. THOUGHTS THAT YOU WOULD BE BETTER OFF DEAD, OR OF HURTING YOURSELF: NOT AT ALL
10. IF YOU CHECKED OFF ANY PROBLEMS, HOW DIFFICULT HAVE THESE PROBLEMS MADE IT FOR YOU TO DO YOUR WORK, TAKE CARE OF THINGS AT HOME, OR GET ALONG WITH OTHER PEOPLE: SOMEWHAT DIFFICULT
SUM OF ALL RESPONSES TO PHQ QUESTIONS 1-9: 11
8. MOVING OR SPEAKING SO SLOWLY THAT OTHER PEOPLE COULD HAVE NOTICED. OR THE OPPOSITE, BEING SO FIGETY OR RESTLESS THAT YOU HAVE BEEN MOVING AROUND A LOT MORE THAN USUAL: SEVERAL DAYS

## 2025-04-14 NOTE — PATIENT INSTRUCTIONS
your area.  Where can you learn more?  Go to https://www.healthVigilos.net/patientEd and enter U357 to learn more about \"Starting a Weight-Loss Plan: Care Instructions.\"  Current as of: April 30, 2024  Content Version: 14.4  © 2272-4075 [employer].   Care instructions adapted under license by [employer]. If you have questions about a medical condition or this instruction, always ask your healthcare professional. [employer] disclaims any warranty or liability for your use of this information.         A Healthy Heart: Care Instructions  Overview     Coronary artery disease, also called heart disease, occurs when a substance called plaque builds up in the vessels that supply oxygen-rich blood to your heart muscle. This can narrow the blood vessels and reduce blood flow. A heart attack happens when blood flow is completely blocked. A high-fat diet, smoking, and other factors increase the risk of heart disease.  Your doctor has found that you have a chance of having heart disease. A heart-healthy lifestyle can help keep your heart healthy and prevent heart disease. This lifestyle includes eating healthy, being active, staying at a weight that's healthy for you, and not smoking or using tobacco. It also includes taking medicines as directed, managing other health conditions, and trying to get a healthy amount of sleep.  Follow-up care is a key part of your treatment and safety. Be sure to make and go to all appointments, and call your doctor if you are having problems. It's also a good idea to know your test results and keep a list of the medicines you take.  How can you care for yourself at home?  Diet    Use less salt when you cook and eat. This helps lower your blood pressure. Taste food before salting. Add only a little salt when you think you need it. With time, your taste buds will adjust to less salt.     Eat fewer snack items, fast foods, canned soups, and other high-salt, high-fat,

## 2025-04-14 NOTE — PROGRESS NOTES
bedtime Yes Stanley Hill MD   ondansetron (ZOFRAN) 4 MG tablet Take 1 tablet by mouth every 8 hours as needed for Nausea or Vomiting Yes Mariano Swift MD   diclofenac sodium (VOLTAREN) 1 % GEL Apply 4 g topically 4 times daily  Patient taking differently: Apply 4 g topically 4 times daily as needed Yes Salomón Hernandez MD   zinc oxide (DESITIN) 40 % ointment Apply topically as needed. Yes Salomón Hernandez MD   atorvastatin (LIPITOR) 20 MG tablet Take 1 tablet by mouth daily Yes Stanley Hill MD   fluticasone (FLONASE ALLERGY RELIEF) 50 MCG/ACT nasal spray 1 spray by Each Nostril route daily  Patient taking differently: 1 spray by Each Nostril route daily as needed Yes Mariano Swift MD   busPIRone (BUSPAR) 10 MG tablet Take 1 tablet by mouth 3 times daily Yes Mariano Swift MD   aspirin 81 MG chewable tablet Take by mouth daily  Yes ProviderStanley MD       CareTeam (Including outside providers/suppliers regularly involved in providing care):   Patient Care Team:  Mariano Swift MD as PCP - General (Family Medicine)  Mariano Swift MD as PCP - Empaneled Provider  Susana Monterroso MD as Consulting Physician (Electrophysiology)     Recommendations for Preventive Services Due: see orders and patient instructions/AVS.  Recommended screening schedule for the next 5-10 years is provided to the patient in written form: see Patient Instructions/AVS.     Reviewed and updated this visit:  Tobacco  Allergies  Meds                   The patient (or guardian, if applicable) and other individuals in attendance with the patient were advised that Artificial Intelligence will be utilized during this visit to record and process the conversation to generate a clinical note. The patient (or guardian, if applicable) and other individuals in attendance at the appointment consented to the use of AI, including the recording.

## 2025-06-09 ENCOUNTER — OFFICE VISIT (OUTPATIENT)
Age: 75
End: 2025-06-09
Payer: MEDICARE

## 2025-06-09 VITALS
OXYGEN SATURATION: 95 % | HEART RATE: 80 BPM | HEIGHT: 71 IN | WEIGHT: 237.5 LBS | BODY MASS INDEX: 33.25 KG/M2 | DIASTOLIC BLOOD PRESSURE: 95 MMHG | SYSTOLIC BLOOD PRESSURE: 127 MMHG

## 2025-06-09 DIAGNOSIS — G89.29 CHRONIC BILATERAL LOW BACK PAIN WITH BILATERAL SCIATICA: Primary | ICD-10-CM

## 2025-06-09 DIAGNOSIS — M54.41 CHRONIC BILATERAL LOW BACK PAIN WITH BILATERAL SCIATICA: Primary | ICD-10-CM

## 2025-06-09 DIAGNOSIS — M54.42 CHRONIC BILATERAL LOW BACK PAIN WITH BILATERAL SCIATICA: Primary | ICD-10-CM

## 2025-06-09 DIAGNOSIS — Z98.1 HISTORY OF LUMBAR FUSION: ICD-10-CM

## 2025-06-09 PROCEDURE — 3080F DIAST BP >= 90 MM HG: CPT | Performed by: PHYSICIAN ASSISTANT

## 2025-06-09 PROCEDURE — 3074F SYST BP LT 130 MM HG: CPT | Performed by: PHYSICIAN ASSISTANT

## 2025-06-09 PROCEDURE — 99204 OFFICE O/P NEW MOD 45 MIN: CPT | Performed by: PHYSICIAN ASSISTANT

## 2025-06-09 PROCEDURE — 1125F AMNT PAIN NOTED PAIN PRSNT: CPT | Performed by: PHYSICIAN ASSISTANT

## 2025-06-09 PROCEDURE — 1160F RVW MEDS BY RX/DR IN RCRD: CPT | Performed by: PHYSICIAN ASSISTANT

## 2025-06-09 PROCEDURE — 1123F ACP DISCUSS/DSCN MKR DOCD: CPT | Performed by: PHYSICIAN ASSISTANT

## 2025-06-09 PROCEDURE — 1159F MED LIST DOCD IN RCRD: CPT | Performed by: PHYSICIAN ASSISTANT

## 2025-06-09 RX ORDER — METHYLPREDNISOLONE 4 MG/1
TABLET ORAL
Qty: 1 KIT | Refills: 0 | Status: SHIPPED | OUTPATIENT
Start: 2025-06-09 | End: 2025-06-15

## 2025-06-09 RX ORDER — METHOCARBAMOL 750 MG/1
750 TABLET, FILM COATED ORAL 4 TIMES DAILY
Qty: 40 TABLET | Refills: 0 | Status: SHIPPED | OUTPATIENT
Start: 2025-06-09 | End: 2025-06-19

## 2025-06-09 ASSESSMENT — ENCOUNTER SYMPTOMS
SHORTNESS OF BREATH: 0
PHOTOPHOBIA: 0
TROUBLE SWALLOWING: 0
ABDOMINAL PAIN: 0
BACK PAIN: 1

## 2025-06-09 NOTE — PROGRESS NOTES
[facility]      Subjective:  Bret Beltrán is a 75 year old male with a past medical history of pacemaker/defibrillator placement, and previous L4-L5 TLIF at the [facility] in 2015. He presents to the office today c/o low back pain with radiation into his legs. Describes the pain as constant, aching, and sharp. Pain has been present for years and is progressively worsening. Admits to numbness/tingling in both legs. He cannot walk long distances due to the pain. Patient has tried Norco, Lyrica, heat, previous physical therapy, and lumbar STEPHANY at [facility] without significant lasting relief. Denies loss of bowel or bladder, saddle anesthesia, headache, loss of dexterity, fever, chills, N/V, SOB, or chest pain.                  Review of Systems   Constitutional:  Negative for fever and unexpected weight change.   HENT:  Negative for trouble swallowing.    Eyes:  Negative for photophobia and visual disturbance.   Respiratory:  Negative for shortness of breath.    Cardiovascular:  Negative for chest pain.   Gastrointestinal:  Negative for abdominal pain.   Endocrine: Negative for heat intolerance.   Genitourinary:  Negative for flank pain.   Musculoskeletal:  Positive for back pain and gait problem. Negative for myalgias and neck pain.   Skin:  Negative for wound.   Neurological:  Positive for weakness and numbness. Negative for headaches.   Psychiatric/Behavioral:  Negative for confusion.         Objective:  Vitals:    06/09/25 1025   BP: (!) 127/95   Pulse: 80   SpO2: 95%       Physical Exam  Constitutional:       Appearance: Normal appearance. He is well-developed.   HENT:      Head: Normocephalic and atraumatic.   Eyes:      Extraocular Movements: Extraocular movements intact.      Conjunctiva/sclera: Conjunctivae normal.      Pupils: Pupils are equal, round, and reactive to light.   Cardiovascular:      Rate and Rhythm: Normal rate.   Pulmonary:      Effort:

## 2025-06-11 DIAGNOSIS — Z98.1 HISTORY OF LUMBAR FUSION: ICD-10-CM

## 2025-06-11 DIAGNOSIS — G89.29 CHRONIC BILATERAL LOW BACK PAIN WITH BILATERAL SCIATICA: Primary | ICD-10-CM

## 2025-06-11 DIAGNOSIS — M54.42 CHRONIC BILATERAL LOW BACK PAIN WITH BILATERAL SCIATICA: Primary | ICD-10-CM

## 2025-06-11 DIAGNOSIS — M54.41 CHRONIC BILATERAL LOW BACK PAIN WITH BILATERAL SCIATICA: Primary | ICD-10-CM

## 2025-06-18 ENCOUNTER — TELEPHONE (OUTPATIENT)
Age: 75
End: 2025-06-18

## 2025-06-18 NOTE — TELEPHONE ENCOUNTER
Patient called in expressing confusion as to why [facility] doesn't have his Imaging orders, I asked if he had told us that he wanted to go to [facility]; patient doesn't remember but stated that he would like those orders sent there please.

## 2025-07-02 ENCOUNTER — OFFICE VISIT (OUTPATIENT)
Dept: FAMILY MEDICINE CLINIC | Age: 75
End: 2025-07-02
Payer: MEDICARE

## 2025-07-02 ENCOUNTER — TELEPHONE (OUTPATIENT)
Age: 75
End: 2025-07-02

## 2025-07-02 VITALS
BODY MASS INDEX: 31.78 KG/M2 | SYSTOLIC BLOOD PRESSURE: 122 MMHG | TEMPERATURE: 97.1 F | RESPIRATION RATE: 14 BRPM | WEIGHT: 227 LBS | OXYGEN SATURATION: 98 % | DIASTOLIC BLOOD PRESSURE: 82 MMHG | HEART RATE: 80 BPM | HEIGHT: 71 IN

## 2025-07-02 DIAGNOSIS — M48.062 SPINAL STENOSIS OF LUMBAR REGION WITH NEUROGENIC CLAUDICATION: Primary | ICD-10-CM

## 2025-07-02 PROCEDURE — 1125F AMNT PAIN NOTED PAIN PRSNT: CPT | Performed by: FAMILY MEDICINE

## 2025-07-02 PROCEDURE — 3074F SYST BP LT 130 MM HG: CPT | Performed by: FAMILY MEDICINE

## 2025-07-02 PROCEDURE — 3079F DIAST BP 80-89 MM HG: CPT | Performed by: FAMILY MEDICINE

## 2025-07-02 PROCEDURE — 99213 OFFICE O/P EST LOW 20 MIN: CPT | Performed by: FAMILY MEDICINE

## 2025-07-02 PROCEDURE — 96372 THER/PROPH/DIAG INJ SC/IM: CPT | Performed by: FAMILY MEDICINE

## 2025-07-02 PROCEDURE — 1123F ACP DISCUSS/DSCN MKR DOCD: CPT | Performed by: FAMILY MEDICINE

## 2025-07-02 RX ORDER — OXYBUTYNIN CHLORIDE 10 MG/1
10 TABLET, EXTENDED RELEASE ORAL DAILY
COMMUNITY
Start: 2025-06-30

## 2025-07-02 RX ORDER — ESCITALOPRAM OXALATE 5 MG/1
5 TABLET ORAL DAILY
COMMUNITY

## 2025-07-02 RX ORDER — TROSPIUM CHLORIDE 20 MG/1
20 TABLET, FILM COATED ORAL 2 TIMES DAILY
COMMUNITY

## 2025-07-02 RX ORDER — SOLIFENACIN SUCCINATE 10 MG/1
10 TABLET, FILM COATED ORAL DAILY
COMMUNITY
Start: 2025-06-30

## 2025-07-02 RX ORDER — METHYLPREDNISOLONE ACETATE 40 MG/ML
40 INJECTION, SUSPENSION INTRA-ARTICULAR; INTRALESIONAL; INTRAMUSCULAR; SOFT TISSUE ONCE
Status: COMPLETED | OUTPATIENT
Start: 2025-07-02 | End: 2025-07-02

## 2025-07-02 RX ADMIN — METHYLPREDNISOLONE ACETATE 40 MG: 40 INJECTION, SUSPENSION INTRA-ARTICULAR; INTRALESIONAL; INTRAMUSCULAR; SOFT TISSUE at 10:02

## 2025-07-02 NOTE — PROGRESS NOTES
OFFICE NOTE    25  Name: Bret Beltrán  :1950   Sex:male   Age:75 y.o.      SUBJECTIVE  Chief Complaint   Patient presents with    Knee Pain     Left knee pain and swelling     Back Pain     Middle of the back     Shoulder Pain     Right arm pain        History of Present Illness  The patient is a 75-year-old male who presents for evaluation of back pain, knee pain, and PTSD.    He has been experiencing back pain, which he attributes to a couple of falls down the steps. He has an upcoming appointment with a physical medicine doctor, but is awaiting authorization from Medicare. He has been diagnosed with spinal stenosis and is under the care of another physician for this condition. His back pain is localized in the lower back. He is considering the possibility of receiving an injection for his back pain. He has not been prescribed any steroids recently. He has received epidural injections for his back pain, with the last one administered almost a year ago. He is currently on gabapentin 300 mg three times a day and Lyrica 100 mg twice a day.    He also reports knee pain. He had a knee replacement surgery in 2021 and was informed that there is significant inflammation in the area. He is interested in exploring the option of a steroid injection for his knee pain.    He has a history of post-traumatic stress disorder (PTSD) and [facility]. He is currently rated at 70% disability for PTSD and is seeking to increase this to 100%. He reports that his PTSD is service-connected.    He has a pacemaker and a defibrillator, had recent ablation, and three stents placed. His shoulder is not in the best condition.    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 He enjoys hunting and trapping.       Review of Systems   No fever, night sweats. Focal neurological symptoms      Current Outpatient Medications:     solifenacin ([facility]) 10

## 2025-07-02 NOTE — TELEPHONE ENCOUNTER
Patient came into office, stated that there's some confusion about his prior authorization for the Myelogram, [employer] is telling him that it's not authorized but I see there's an authorization in the chart..   Could someone possibly reach out to them to figure out what the issue is?

## 2025-07-15 ENCOUNTER — APPOINTMENT (OUTPATIENT)
Dept: GENERAL RADIOLOGY | Age: 75
DRG: 309 | End: 2025-07-15
Payer: MEDICARE

## 2025-07-15 ENCOUNTER — HOSPITAL ENCOUNTER (INPATIENT)
Age: 75
LOS: 1 days | Discharge: HOME | DRG: 309 | End: 2025-07-16
Attending: EMERGENCY MEDICINE | Admitting: FAMILY MEDICINE
Payer: MEDICARE

## 2025-07-15 DIAGNOSIS — Z45.02 ICD (IMPLANTABLE CARDIOVERTER-DEFIBRILLATOR) DISCHARGE: Primary | ICD-10-CM

## 2025-07-15 LAB
ALBUMIN SERPL-MCNC: 3.7 G/DL (ref 3.5–5.2)
ALP SERPL-CCNC: 54 U/L (ref 40–129)
ALT SERPL-CCNC: 77 U/L (ref 0–50)
ANION GAP SERPL CALCULATED.3IONS-SCNC: 11 MMOL/L (ref 7–16)
AST SERPL-CCNC: 32 U/L (ref 0–50)
BASOPHILS # BLD: 0.03 K/UL (ref 0–0.2)
BASOPHILS NFR BLD: 0 % (ref 0–2)
BILIRUB SERPL-MCNC: 0.7 MG/DL (ref 0–1.2)
BNP SERPL-MCNC: 298 PG/ML (ref 0–450)
BUN SERPL-MCNC: 36 MG/DL (ref 8–23)
CALCIUM SERPL-MCNC: 9.3 MG/DL (ref 8.8–10.2)
CHLORIDE SERPL-SCNC: 108 MMOL/L (ref 98–107)
CO2 SERPL-SCNC: 21 MMOL/L (ref 22–29)
CREAT SERPL-MCNC: 1.3 MG/DL (ref 0.7–1.2)
EOSINOPHIL # BLD: 0.07 K/UL (ref 0.05–0.5)
EOSINOPHILS RELATIVE PERCENT: 1 % (ref 0–6)
ERYTHROCYTE [DISTWIDTH] IN BLOOD BY AUTOMATED COUNT: 14.9 % (ref 11.5–15)
GFR, ESTIMATED: 55 ML/MIN/1.73M2
GLUCOSE SERPL-MCNC: 114 MG/DL (ref 74–99)
HCT VFR BLD AUTO: 35.9 % (ref 37–54)
HGB BLD-MCNC: 13.4 G/DL (ref 12.5–16.5)
IMM GRANULOCYTES # BLD AUTO: 0.03 K/UL (ref 0–0.58)
IMM GRANULOCYTES NFR BLD: 0 % (ref 0–5)
LYMPHOCYTES NFR BLD: 1.47 K/UL (ref 1.5–4)
LYMPHOCYTES RELATIVE PERCENT: 17 % (ref 20–42)
MAGNESIUM SERPL-MCNC: 1.9 MG/DL (ref 1.6–2.4)
MCH RBC QN AUTO: 29.9 PG (ref 26–35)
MCHC RBC AUTO-ENTMCNC: 37.3 G/DL (ref 32–34.5)
MCV RBC AUTO: 80.1 FL (ref 80–99.9)
MONOCYTES NFR BLD: 0.57 K/UL (ref 0.1–0.95)
MONOCYTES NFR BLD: 7 % (ref 2–12)
NEUTROPHILS NFR BLD: 75 % (ref 43–80)
NEUTS SEG NFR BLD: 6.46 K/UL (ref 1.8–7.3)
PLATELET, FLUORESCENCE: 133 K/UL (ref 130–450)
PMV BLD AUTO: 11.5 FL (ref 7–12)
POTASSIUM SERPL-SCNC: 3.9 MMOL/L (ref 3.5–5.1)
PROT SERPL-MCNC: 6.7 G/DL (ref 6.4–8.3)
RBC # BLD AUTO: 4.48 M/UL (ref 3.8–5.8)
SODIUM SERPL-SCNC: 141 MMOL/L (ref 136–145)
TROPONIN I SERPL HS-MCNC: 22 NG/L (ref 0–22)
TROPONIN I SERPL HS-MCNC: 26 NG/L (ref 0–22)
WBC OTHER # BLD: 8.6 K/UL (ref 4.5–11.5)

## 2025-07-15 PROCEDURE — 80053 COMPREHEN METABOLIC PANEL: CPT

## 2025-07-15 PROCEDURE — 84484 ASSAY OF TROPONIN QUANT: CPT

## 2025-07-15 PROCEDURE — 6370000000 HC RX 637 (ALT 250 FOR IP): Performed by: EMERGENCY MEDICINE

## 2025-07-15 PROCEDURE — 6360000002 HC RX W HCPCS: Performed by: EMERGENCY MEDICINE

## 2025-07-15 PROCEDURE — 99285 EMERGENCY DEPT VISIT HI MDM: CPT

## 2025-07-15 PROCEDURE — 96365 THER/PROPH/DIAG IV INF INIT: CPT

## 2025-07-15 PROCEDURE — 83880 ASSAY OF NATRIURETIC PEPTIDE: CPT

## 2025-07-15 PROCEDURE — 2060000000 HC ICU INTERMEDIATE R&B

## 2025-07-15 PROCEDURE — 93005 ELECTROCARDIOGRAM TRACING: CPT

## 2025-07-15 PROCEDURE — 99223 1ST HOSP IP/OBS HIGH 75: CPT | Performed by: FAMILY MEDICINE

## 2025-07-15 PROCEDURE — 83735 ASSAY OF MAGNESIUM: CPT

## 2025-07-15 PROCEDURE — 71045 X-RAY EXAM CHEST 1 VIEW: CPT

## 2025-07-15 PROCEDURE — 85025 COMPLETE CBC W/AUTO DIFF WBC: CPT

## 2025-07-15 RX ORDER — SODIUM CHLORIDE 9 MG/ML
INJECTION, SOLUTION INTRAVENOUS PRN
Status: DISCONTINUED | OUTPATIENT
Start: 2025-07-15 | End: 2025-07-16 | Stop reason: HOSPADM

## 2025-07-15 RX ORDER — PROCHLORPERAZINE EDISYLATE 5 MG/ML
10 INJECTION INTRAMUSCULAR; INTRAVENOUS EVERY 6 HOURS PRN
Status: DISCONTINUED | OUTPATIENT
Start: 2025-07-15 | End: 2025-07-16 | Stop reason: HOSPADM

## 2025-07-15 RX ORDER — POTASSIUM CHLORIDE 7.45 MG/ML
10 INJECTION INTRAVENOUS PRN
Status: DISCONTINUED | OUTPATIENT
Start: 2025-07-15 | End: 2025-07-16 | Stop reason: HOSPADM

## 2025-07-15 RX ORDER — SODIUM CHLORIDE 0.9 % (FLUSH) 0.9 %
5-40 SYRINGE (ML) INJECTION EVERY 12 HOURS SCHEDULED
Status: DISCONTINUED | OUTPATIENT
Start: 2025-07-16 | End: 2025-07-16 | Stop reason: HOSPADM

## 2025-07-15 RX ORDER — SODIUM CHLORIDE 0.9 % (FLUSH) 0.9 %
5-40 SYRINGE (ML) INJECTION PRN
Status: DISCONTINUED | OUTPATIENT
Start: 2025-07-15 | End: 2025-07-16 | Stop reason: HOSPADM

## 2025-07-15 RX ORDER — POTASSIUM CHLORIDE 1500 MG/1
40 TABLET, EXTENDED RELEASE ORAL PRN
Status: DISCONTINUED | OUTPATIENT
Start: 2025-07-15 | End: 2025-07-16 | Stop reason: HOSPADM

## 2025-07-15 RX ORDER — POTASSIUM CHLORIDE 1500 MG/1
40 TABLET, EXTENDED RELEASE ORAL ONCE
Status: COMPLETED | OUTPATIENT
Start: 2025-07-15 | End: 2025-07-15

## 2025-07-15 RX ORDER — PROCHLORPERAZINE MALEATE 10 MG
10 TABLET ORAL EVERY 8 HOURS PRN
Status: DISCONTINUED | OUTPATIENT
Start: 2025-07-15 | End: 2025-07-16 | Stop reason: HOSPADM

## 2025-07-15 RX ORDER — MAGNESIUM SULFATE IN WATER 40 MG/ML
2000 INJECTION, SOLUTION INTRAVENOUS PRN
Status: DISCONTINUED | OUTPATIENT
Start: 2025-07-15 | End: 2025-07-16 | Stop reason: HOSPADM

## 2025-07-15 RX ORDER — MAGNESIUM SULFATE IN WATER 40 MG/ML
2000 INJECTION, SOLUTION INTRAVENOUS ONCE
Status: COMPLETED | OUTPATIENT
Start: 2025-07-15 | End: 2025-07-16

## 2025-07-15 RX ORDER — ENOXAPARIN SODIUM 100 MG/ML
30 INJECTION SUBCUTANEOUS 2 TIMES DAILY
Status: DISCONTINUED | OUTPATIENT
Start: 2025-07-16 | End: 2025-07-16 | Stop reason: HOSPADM

## 2025-07-15 RX ORDER — ONDANSETRON 2 MG/ML
4 INJECTION INTRAMUSCULAR; INTRAVENOUS EVERY 6 HOURS PRN
Status: DISCONTINUED | OUTPATIENT
Start: 2025-07-15 | End: 2025-07-15 | Stop reason: CLARIF

## 2025-07-15 RX ORDER — ACETAMINOPHEN 650 MG/1
650 SUPPOSITORY RECTAL EVERY 6 HOURS PRN
Status: DISCONTINUED | OUTPATIENT
Start: 2025-07-15 | End: 2025-07-16 | Stop reason: HOSPADM

## 2025-07-15 RX ORDER — ACETAMINOPHEN 325 MG/1
650 TABLET ORAL EVERY 6 HOURS PRN
Status: DISCONTINUED | OUTPATIENT
Start: 2025-07-15 | End: 2025-07-16 | Stop reason: HOSPADM

## 2025-07-15 RX ORDER — POLYETHYLENE GLYCOL 3350 17 G/17G
17 POWDER, FOR SOLUTION ORAL DAILY PRN
Status: DISCONTINUED | OUTPATIENT
Start: 2025-07-15 | End: 2025-07-16 | Stop reason: HOSPADM

## 2025-07-15 RX ORDER — ONDANSETRON 4 MG/1
4 TABLET, ORALLY DISINTEGRATING ORAL EVERY 8 HOURS PRN
Status: DISCONTINUED | OUTPATIENT
Start: 2025-07-15 | End: 2025-07-15 | Stop reason: CLARIF

## 2025-07-15 RX ADMIN — MAGNESIUM SULFATE HEPTAHYDRATE 2000 MG: 40 INJECTION, SOLUTION INTRAVENOUS at 22:45

## 2025-07-15 RX ADMIN — POTASSIUM CHLORIDE 40 MEQ: 1500 TABLET, EXTENDED RELEASE ORAL at 22:46

## 2025-07-15 ASSESSMENT — PAIN - FUNCTIONAL ASSESSMENT: PAIN_FUNCTIONAL_ASSESSMENT: NONE - DENIES PAIN

## 2025-07-15 ASSESSMENT — LIFESTYLE VARIABLES: HOW OFTEN DO YOU HAVE A DRINK CONTAINING ALCOHOL: NEVER

## 2025-07-16 VITALS
BODY MASS INDEX: 31.15 KG/M2 | OXYGEN SATURATION: 96 % | RESPIRATION RATE: 17 BRPM | HEART RATE: 81 BPM | DIASTOLIC BLOOD PRESSURE: 91 MMHG | TEMPERATURE: 97 F | HEIGHT: 72 IN | SYSTOLIC BLOOD PRESSURE: 123 MMHG | WEIGHT: 230 LBS

## 2025-07-16 PROBLEM — I25.5 ISCHEMIC CARDIOMYOPATHY: Status: ACTIVE | Noted: 2025-07-16

## 2025-07-16 PROBLEM — I47.20 VENTRICULAR TACHYCARDIA (HCC): Status: ACTIVE | Noted: 2025-07-16

## 2025-07-16 PROBLEM — Z95.810 BIVENTRICULAR IMPLANTABLE CARDIOVERTER-DEFIBRILLATOR (ICD) IN SITU: Status: ACTIVE | Noted: 2025-07-16

## 2025-07-16 PROBLEM — Z45.02 ICD (IMPLANTABLE CARDIOVERTER-DEFIBRILLATOR) DISCHARGE: Status: ACTIVE | Noted: 2025-07-16

## 2025-07-16 LAB
ALBUMIN SERPL-MCNC: 3.5 G/DL (ref 3.5–5.2)
ALP SERPL-CCNC: 61 U/L (ref 40–129)
ALT SERPL-CCNC: 69 U/L (ref 0–50)
ANION GAP SERPL CALCULATED.3IONS-SCNC: 10 MMOL/L (ref 7–16)
AST SERPL-CCNC: 34 U/L (ref 0–50)
BASOPHILS # BLD: 0.01 K/UL (ref 0–0.2)
BASOPHILS NFR BLD: 0 % (ref 0–2)
BILIRUB SERPL-MCNC: 1.2 MG/DL (ref 0–1.2)
BUN SERPL-MCNC: 31 MG/DL (ref 8–23)
CALCIUM SERPL-MCNC: 8.9 MG/DL (ref 8.8–10.2)
CHLORIDE SERPL-SCNC: 108 MMOL/L (ref 98–107)
CO2 SERPL-SCNC: 20 MMOL/L (ref 22–29)
CREAT SERPL-MCNC: 1.1 MG/DL (ref 0.7–1.2)
EKG ATRIAL RATE: 91 BPM
EKG P AXIS: 88 DEGREES
EKG P-R INTERVAL: 122 MS
EKG Q-T INTERVAL: 420 MS
EKG QRS DURATION: 152 MS
EKG QTC CALCULATION (BAZETT): 516 MS
EKG R AXIS: 179 DEGREES
EKG T AXIS: 83 DEGREES
EKG VENTRICULAR RATE: 91 BPM
EOSINOPHIL # BLD: 0 K/UL (ref 0.05–0.5)
EOSINOPHILS RELATIVE PERCENT: 0 % (ref 0–6)
ERYTHROCYTE [DISTWIDTH] IN BLOOD BY AUTOMATED COUNT: 15.7 % (ref 11.5–15)
GFR, ESTIMATED: 70 ML/MIN/1.73M2
GLUCOSE SERPL-MCNC: 107 MG/DL (ref 74–99)
HCT VFR BLD AUTO: 41.4 % (ref 37–54)
HGB BLD-MCNC: 14.9 G/DL (ref 12.5–16.5)
IMM GRANULOCYTES # BLD AUTO: <0.03 K/UL (ref 0–0.58)
IMM GRANULOCYTES NFR BLD: 0 % (ref 0–5)
LYMPHOCYTES NFR BLD: 0.31 K/UL (ref 1.5–4)
LYMPHOCYTES RELATIVE PERCENT: 7 % (ref 20–42)
MCH RBC QN AUTO: 30 PG (ref 26–35)
MCHC RBC AUTO-ENTMCNC: 36 G/DL (ref 32–34.5)
MCV RBC AUTO: 83.3 FL (ref 80–99.9)
MONOCYTES NFR BLD: 0.03 K/UL (ref 0.1–0.95)
MONOCYTES NFR BLD: 1 % (ref 2–12)
NEUTROPHILS NFR BLD: 92 % (ref 43–80)
NEUTS SEG NFR BLD: 4.38 K/UL (ref 1.8–7.3)
PLATELET # BLD AUTO: 116 K/UL (ref 130–450)
PMV BLD AUTO: 10.5 FL (ref 7–12)
POTASSIUM SERPL-SCNC: 4.3 MMOL/L (ref 3.5–5.1)
PROT SERPL-MCNC: 6.6 G/DL (ref 6.4–8.3)
RBC # BLD AUTO: 4.97 M/UL (ref 3.8–5.8)
RBC # BLD: ABNORMAL 10*6/UL
SODIUM SERPL-SCNC: 138 MMOL/L (ref 136–145)
WBC OTHER # BLD: 4.8 K/UL (ref 4.5–11.5)

## 2025-07-16 PROCEDURE — 93010 ELECTROCARDIOGRAM REPORT: CPT | Performed by: INTERNAL MEDICINE

## 2025-07-16 PROCEDURE — 2500000003 HC RX 250 WO HCPCS: Performed by: FAMILY MEDICINE

## 2025-07-16 PROCEDURE — 6370000000 HC RX 637 (ALT 250 FOR IP): Performed by: INTERNAL MEDICINE

## 2025-07-16 PROCEDURE — 36415 COLL VENOUS BLD VENIPUNCTURE: CPT

## 2025-07-16 PROCEDURE — 85025 COMPLETE CBC W/AUTO DIFF WBC: CPT

## 2025-07-16 PROCEDURE — 80053 COMPREHEN METABOLIC PANEL: CPT

## 2025-07-16 PROCEDURE — 99232 SBSQ HOSP IP/OBS MODERATE 35: CPT | Performed by: INTERNAL MEDICINE

## 2025-07-16 PROCEDURE — 6360000002 HC RX W HCPCS: Performed by: FAMILY MEDICINE

## 2025-07-16 PROCEDURE — 6370000000 HC RX 637 (ALT 250 FOR IP): Performed by: FAMILY MEDICINE

## 2025-07-16 RX ORDER — TAMSULOSIN HYDROCHLORIDE 0.4 MG/1
0.4 CAPSULE ORAL NIGHTLY
Status: DISCONTINUED | OUTPATIENT
Start: 2025-07-16 | End: 2025-07-16 | Stop reason: HOSPADM

## 2025-07-16 RX ORDER — PREGABALIN 100 MG/1
100 CAPSULE ORAL 3 TIMES DAILY PRN
Status: DISCONTINUED | OUTPATIENT
Start: 2025-07-16 | End: 2025-07-16 | Stop reason: HOSPADM

## 2025-07-16 RX ORDER — LANOLIN ALCOHOL/MO/W.PET/CERES
400 CREAM (GRAM) TOPICAL DAILY
Status: DISCONTINUED | OUTPATIENT
Start: 2025-07-16 | End: 2025-07-16 | Stop reason: HOSPADM

## 2025-07-16 RX ORDER — BUSPIRONE HYDROCHLORIDE 5 MG/1
10 TABLET ORAL 3 TIMES DAILY
Status: DISCONTINUED | OUTPATIENT
Start: 2025-07-16 | End: 2025-07-16 | Stop reason: HOSPADM

## 2025-07-16 RX ORDER — METOPROLOL SUCCINATE 100 MG/1
100 TABLET, EXTENDED RELEASE ORAL 3 TIMES DAILY
Status: DISCONTINUED | OUTPATIENT
Start: 2025-07-16 | End: 2025-07-16 | Stop reason: HOSPADM

## 2025-07-16 RX ORDER — HYDRALAZINE HYDROCHLORIDE 20 MG/ML
10 INJECTION INTRAMUSCULAR; INTRAVENOUS ONCE
Status: DISCONTINUED | OUTPATIENT
Start: 2025-07-16 | End: 2025-07-16

## 2025-07-16 RX ORDER — FOLIC ACID 1 MG/1
1 TABLET ORAL DAILY
Status: DISCONTINUED | OUTPATIENT
Start: 2025-07-16 | End: 2025-07-16 | Stop reason: HOSPADM

## 2025-07-16 RX ORDER — ASPIRIN 81 MG/1
81 TABLET, CHEWABLE ORAL DAILY
Status: DISCONTINUED | OUTPATIENT
Start: 2025-07-16 | End: 2025-07-16 | Stop reason: HOSPADM

## 2025-07-16 RX ORDER — PANTOPRAZOLE SODIUM 40 MG/1
40 TABLET, DELAYED RELEASE ORAL
Status: DISCONTINUED | OUTPATIENT
Start: 2025-07-17 | End: 2025-07-16 | Stop reason: HOSPADM

## 2025-07-16 RX ORDER — FINASTERIDE 5 MG/1
5 TABLET, FILM COATED ORAL NIGHTLY
Status: DISCONTINUED | OUTPATIENT
Start: 2025-07-16 | End: 2025-07-16 | Stop reason: HOSPADM

## 2025-07-16 RX ADMIN — Medication 400 MG: at 17:51

## 2025-07-16 RX ADMIN — FOLIC ACID 1 MG: 1 TABLET ORAL at 17:51

## 2025-07-16 RX ADMIN — SODIUM CHLORIDE, PRESERVATIVE FREE 10 ML: 5 INJECTION INTRAVENOUS at 08:53

## 2025-07-16 RX ADMIN — ACETAMINOPHEN 650 MG: 325 TABLET ORAL at 08:52

## 2025-07-16 RX ADMIN — ENOXAPARIN SODIUM 30 MG: 100 INJECTION SUBCUTANEOUS at 00:54

## 2025-07-16 RX ADMIN — BUSPIRONE HYDROCHLORIDE 10 MG: 5 TABLET ORAL at 17:51

## 2025-07-16 RX ADMIN — ENOXAPARIN SODIUM 30 MG: 100 INJECTION SUBCUTANEOUS at 08:53

## 2025-07-16 RX ADMIN — ASPIRIN 81 MG: 81 TABLET, CHEWABLE ORAL at 17:51

## 2025-07-16 RX ADMIN — METOPROLOL SUCCINATE 100 MG: 100 TABLET, EXTENDED RELEASE ORAL at 17:51

## 2025-07-16 ASSESSMENT — PAIN DESCRIPTION - LOCATION: LOCATION: BACK;KNEE

## 2025-07-16 ASSESSMENT — PAIN DESCRIPTION - DESCRIPTORS: DESCRIPTORS: ACHING;SORE;DISCOMFORT

## 2025-07-16 ASSESSMENT — PAIN SCALES - GENERAL
PAINLEVEL_OUTOF10: 1
PAINLEVEL_OUTOF10: 5

## 2025-07-16 ASSESSMENT — PAIN DESCRIPTION - ORIENTATION: ORIENTATION: LOWER;RIGHT;LEFT

## 2025-07-16 NOTE — ED PROVIDER NOTES
[facility] EMERGENCY DEPARTMENT  EMERGENCY DEPARTMENT ENCOUNTER        Pt Name: Bret Beltrán  MRN: [id number]  Birthdate 1950  Date of evaluation: 7/15/2025  Provider: Dragan Robbins MD  PCP: Mariano Swift MD  Note Started: 9:06 PM EDT 7/15/25    CHIEF COMPLAINT       Chief Complaint   Patient presents with    Pacemaker Problem     Pt was shocked x3 from his defibrillator. Pt has no s/s had no s/s when it fired either.        HISTORY OF PRESENT ILLNESS: 1 or more Elements   History From: Patient    Limitations to history : None  Social Determinants : None    Bret Beltrán is a 75 y.o. male with history of hypertension, coronary artery disease, congestive heart failure, ICD in place who presents with concerns of pacemaker firing.  Patient mentioned that he was shocked 3 times earlier today from his defibrillator.  Patient mentioned that he has been eating and drinking normally.  Patient mentioned that he was working on his new house he sat down and when he got up he got shocked.  It happened again 2 times when he got up from sitting position.  Currently complains discomfort in the chest with the shocks.    Normal stress test on 6/3/2024.    Patient had ICD in place due to history of ventricular tachycardia.  Patient is currently on metoprolol.  Not on any other rhythm or rate control agents.    Denies any fever, chills, nausea, vomiting, headache, dizziness, vision changes, neck tenderness or stiffness, weakness, palpitations, leg swelling/tenderness, sob, cough, abdominal pain, dysuria, hematuria, diarrhea, constipation, bloody stools.    Nursing Notes were all reviewed and agreed with or any disagreements were addressed in the HPI.    ROS:   Pertinent positives and negatives are stated within HPI, all other systems reviewed and are negative.      --------------------------------------------- PAST HISTORY ---------------------------------------------  Past Medical History:

## 2025-07-16 NOTE — PLAN OF CARE
Problem: Chronic Conditions and Co-morbidities  Goal: Patient's chronic conditions and co-morbidity symptoms are monitored and maintained or improved  Outcome: Progressing  Flowsheets (Taken 7/16/2025 1212)  Care Plan - Patient's Chronic Conditions and Co-Morbidity Symptoms are Monitored and Maintained or Improved: Monitor and assess patient's chronic conditions and comorbid symptoms for stability, deterioration, or improvement     Problem: Discharge Planning  Goal: Discharge to home or other facility with appropriate resources  Outcome: Progressing  Flowsheets (Taken 7/16/2025 1212)  Discharge to home or other facility with appropriate resources: Identify barriers to discharge with patient and caregiver

## 2025-07-16 NOTE — PROGRESS NOTES
4 Eyes Skin Assessment     NAME:  Bret Beltrán  YOB: 1950  MEDICAL RECORD NUMBER:  [id number]    The patient is being assessed for  Admission    I agree that at least one RN has performed a thorough Head to Toe Skin Assessment on the patient. ALL assessment sites listed below have been assessed.      Areas assessed by both nurses:    Head, Face, Ears, Shoulders, Back, Chest, Arms, Elbows, Hands, Sacrum. Buttock, Coccyx, Ischium, Legs. Feet and Heels, and Under Medical Devices         Does the Patient have a Wound? No noted wound(s)       Jonas Prevention initiated by RN: No  Wound Care Orders initiated by RN: No    For hospital-acquired stage 1 & 2 and ALL Stage 3,4, Unstageable, DTI, NWPT, and Complex wounds: place order “IP Wound Care/Ostomy Nurse Eval and Treat” by RN under : No    New Ostomies, if present place, Ostomy referral order under : No     Nurse 1 eSignature: Electronically signed by NARCISA MENDIETA RN on 7/16/25 at 3:59 PM EDT    **SHARE this note so that the co-signing nurse can place an eSignature**    Nurse 2 eSignature: {Esignature:[id number]}

## 2025-07-16 NOTE — CARE COORDINATION
Social Work /Transition of Care:    Pt presented to the ED on 7/15   
admission:              Current DME:              Type of Home Care services:       ADLS  Prior functional level:    Current functional level:      PT AM-PAC:   /24  OT AM-PAC:   /24    Family can provide assistance at DC:    Would you like Case Management to discuss the discharge plan with any other family members/significant others, and if so, who?    Plans to Return to Present Housing:    Other Identified Issues/Barriers to RETURNING to current housing:   Potential Assistance needed at discharge:              Potential DME:    Patient expects to discharge to:    Plan for transportation at discharge:      Financial    Payor: TIFFANIE PEREZ MEDICARE / Plan: TESHA PERE[place] MEDICARE / Product Type: *No Product type* /     Does insurance require precert for SNF: Yes    Potential assistance Purchasing Medications:    Meds-to-Beds request:        [facility] - [place], [place] - [address] [phone] - F [phone]  [address] [place] [address]  Phone: [phone] Fax: [phone]      Notes:    Factors facilitating achievement of predicted outcomes: Family support, Caregiver support, Friend support, Motivated, Cooperative, Pleasant, Sense of humor, Good insight into deficits, and Has needed Durable Medical Equipment at home    Barriers to discharge: Pain    Additional Case Management Notes:     The Plan for Transition of Care is related to the following treatment goals of ICD (implantable cardioverter-defibrillator) discharge [Z45.02]  AICD discharge [Z45.02]    IF APPLICABLE: The Patient and/or patient representative Bret and his family were provided with a choice of provider and agrees with the discharge plan. Freedom of choice list with basic dialogue that supports the patient's individualized plan of care/goals and shares the quality data associated with the providers was provided to:     Patient Representative Name:       The Patient and/or Patient Representative Agree with the Discharge Plan?      Leila

## 2025-07-16 NOTE — PROGRESS NOTES
Hospitalist Progress Note      Synopsis: Patient admitted on 7/15/2025 75 y.o. year old male  who  has a past medical history of Cerebral artery occlusion with cerebral infarction (HCC), Chronic back pain, Chronic systolic congestive heart failure (HCC), Coronary artery disease involving native coronary artery of native heart without angina pectoris, Depression, Hyperlipidemia, Hypertension, Pacemaker, Sleep apnea, TIA (transient ischemic attack), and Ventricular tachycardia (HCC).      Patient presented to the emergency department after being shocked 3 times by his pacemaker.  No symptoms accompanying these discharges.  Patient denies fever, chills, nausea, vomiting, chest pain, shortness of breath, headache, dizziness, abdominal pain, dysuria, hematuria, constipation, diarrhea.  Vital signs with patient be hypertensive pressure 176/106.  Chest x-ray unremarkable.  EKG unremarkable.  Laboratory studies demonstrate BUN 36, creatinine 1.3, glucose 114, troponin 22 with repeat of 26.  Patient was given magnesium and potassium in the emergency department.  Medicine consulted for admission       ASSESSMENT:    Principal Problem:    AICD discharge  Active Problems:    ICD (implantable cardioverter-defibrillator) discharge  Resolved Problems:    * No resolved hospital problems. *       AICD discharge x 3  - Hypertension  - Hyperlipidemia  - Diabetes mellitus  - Anxiety/depression  - BPH  - GERD     PLAN:    -Admit to medicine  - Consult electrophysiology  - Telemetry  - Monitor serum electrolytes  - Continue home medications        Diet: ADULT DIET; Regular  Code Status: Full Code  PT/OT Eval Status:     DVT Prophylaxis:     Recommended disposition at discharge:      Subjective    Feeling better wants to go home  No CP or SOB  No fever or chills   No uncontrolled pain  No vomiting or diarrhea   No events reported overnight     Exam:  BP (!) 123/91   Pulse 81   Temp 97 °F (36.1 °C) (Temporal)   Resp 17   Ht 1.829 m

## 2025-07-16 NOTE — CONSULTS
[facility] PHYSICIANS- [facility] Electrophysiology  Consultation Report  PATIENT: Bret Beltrán  MEDICAL RECORD NUMBER: [id number]  DATE OF SERVICE:  7/16/2025  ATTENDING ELECTROPHYSIOLOGIST: Jael Gardner MD  PRIMARY ELECTROPHYSIOLOGIST: JULIANNA at [facility]  REFERRING PHYSICIAN: No ref. provider found and Mariano Swift MD  CHIEF COMPLAINT: ICD discharges    HPI: This is a 75 y.o. male with a history of coronary artery disease, status post CABG x 2 LIMA to LAD, SVG to OM 2 in 2022, HFrEF, diabetes, peripheral vascular disease, recurrent VT and post ICD implant, paroxysmal atrial fibrillation followed most recently at [facility] in [place] and the [facility].  He first presented with high degree AV block and underwent a pacemaker implant in March 2014.  Device upgraded to an ICD in September 2020 by Dr. Monterroso due to sustained VT.  He was started on amiodarone therapy in 2020 due to recurrent VT requiring ATP therapies and ICD shocks but discontinued later that year when he was placed on mexiletine.  Amiodarone was restarted and stopped again in 2021 and he was placed on quinidine with the patient deferred admission for sotalol or Tikosyn.  Subsequently underwent coronary bypass surgery in 2022 and [facility] in [place].  He then presented with multiple ICD discharges in 2023 when he was loaded with sotalol.  However since echocardiography revealed LVEF of 30% he was transferred to the [facility] for VT ablation.  This was completed on 4/28/2023.  He did have residual nonsustained VT post ablation and therefore amiodarone was started again.    As mentioned he is currently followed at [facility] in [place] and has been maintained on aspirin, atorvastatin, Proscar, metoprolol succinate 100 mg 3 times a day, omeprazole, Entresto 49/51 twice a day, Lyrica, Vesicare, oxybutynin.  He presents to this hospital for multiple-ICD discharges that occurred

## 2025-07-16 NOTE — H&P
Hospitalist History & Physical      PCP: Mariano Swift MD    Date of Service: Pt seen/examined on 7/15/2025    Chief Complaint:  had concerns including Pacemaker Problem (Pt was shocked x3 from his defibrillator. Pt has no s/s had no s/s when it fired either. ).    History Of Present Illness:    Mr. Bret Beltrán, a 75 y.o. year old male  who  has a past medical history of Cerebral artery occlusion with cerebral infarction (HCC), Chronic back pain, Chronic systolic congestive heart failure (HCC), Coronary artery disease involving native coronary artery of native heart without angina pectoris, Depression, Hyperlipidemia, Hypertension, Pacemaker, Sleep apnea, TIA (transient ischemic attack), and Ventricular tachycardia (HCC).     Patient presented to the emergency department after being shocked 3 times by his pacemaker.  No symptoms accompanying these discharges.  Patient denies fever, chills, nausea, vomiting, chest pain, shortness of breath, headache, dizziness, abdominal pain, dysuria, hematuria, constipation, diarrhea.  Vital signs with patient be hypertensive pressure 176/106.  Chest x-ray unremarkable.  EKG unremarkable.  Laboratory studies demonstrate BUN 36, creatinine 1.3, glucose 114, troponin 22 with repeat of 26.  Patient was given magnesium and potassium in the emergency department.  Medicine consulted for admission      Past Medical History:   Diagnosis Date    Cerebral artery occlusion with cerebral infarction (HCC)     Chronic back pain     Chronic systolic congestive heart failure (HCC) 11/28/2023    Coronary artery disease involving native coronary artery of native heart without angina pectoris 7/5/2022    Depression     Hyperlipidemia     Hypertension     Pacemaker     Sleep apnea     TIA (transient ischemic attack)     Ventricular tachycardia (HCC)        Past Surgical History:   Procedure Laterality Date    BACK SURGERY      CARDIAC CATHETERIZATION  09/15/2020    Dr. Leyva

## 2025-07-17 ENCOUNTER — TELEPHONE (OUTPATIENT)
Dept: FAMILY MEDICINE CLINIC | Age: 75
End: 2025-07-17

## 2025-07-17 ENCOUNTER — CARE COORDINATION (OUTPATIENT)
Dept: CARE COORDINATION | Age: 75
End: 2025-07-17

## 2025-07-17 NOTE — CARE COORDINATION
Care Transitions Note    Initial Call - Call within 2 business days of discharge: Yes    Attempted to reach patient for transitions of care follow up. Unable to reach patient.    Outreach Attempts:   1st attempt. HIPAA compliant voicemail left for patient.     Patient: Bret Beltrán    Patient : 1950   MRN: [id number]    Reason for Admission: ICD discharge  Discharge Date: 25  RURS: Readmission Risk Score: 9.8    Last Discharge Facility       Date Complaint Diagnosis Description Type Department Provider    7/15/25 Pacemaker Problem ICD (implantable cardioverter-defibrillator) discharge ED to Hosp-Admission (Discharged) (ADMITTED) [facility] Justin Castillo MD; Kevin...            Was this an external facility discharge? No    Follow Up Appointment:   Patient does not have a follow up appointment scheduled at time of call. CTN will route a high priority message to [facility] and request office staff outreach to the pt and offer a 7d HFU appointment.   Future Appointments         Provider Specialty Dept Phone    2025 10:20 AM Mariano Swift MD Family Medicine [phone]            Plan for follow-up on next business day.      Fallon Jaquez RN

## 2025-07-17 NOTE — TELEPHONE ENCOUNTER
Care Transitions Initial Follow Up Call    Outreach made within 2 business days of discharge: Yes    Patient: Bret Beltrán Patient : 1950   MRN: [id number]  Reason for Admission: AICD discharge   Discharge Date: 25       Spoke with: Voicemail left for patient to return our call at their earliest convenience.            Radha Diamond MA

## 2025-07-18 ENCOUNTER — CARE COORDINATION (OUTPATIENT)
Dept: CARE COORDINATION | Age: 75
End: 2025-07-18

## 2025-07-18 NOTE — CARE COORDINATION
Care Transitions Note    Initial Call - Call within 2 business days of discharge: Yes    Attempted to reach patient for transitions of care follow up. Unable to reach patient.    Outreach Attempts:   2nd attempt. HIPAA compliant voicemail left for patient.     Per chart review the patient is  active on [employer], CTN will send unable to reach letter in [employer].     No further outreaches will be attempted.    CTN signing off and resolving the CT program.    Patient: Bret Beltrán    Patient : 1950   MRN: [id number]    Reason for Admission: ICD discharge  Discharge Date: 25  RURS: Readmission Risk Score: 9.8    Last Discharge Facility       Date Complaint Diagnosis Description Type Department Provider    7/15/25 Pacemaker Problem ICD (implantable cardioverter-defibrillator) discharge ED to Hosp-Admission (Discharged) (ADMITTED) [facility] Justin Castillo MD; Kevin...            Was this an external facility discharge? No    Follow Up Appointment:   Patient does not have a follow up appointment scheduled at time of call. CTN routed high priority message to [facility] Office yesterday requesting outreach. Noted PC office has attempted to reach the pt to schedule.  Future Appointments         Provider Specialty Dept Phone    2025 10:20 AM Mariano Swift MD Family Medicine [phone]            No further follow-up call indicated     Fallon Jaquez RN